# Patient Record
Sex: FEMALE | Race: BLACK OR AFRICAN AMERICAN | NOT HISPANIC OR LATINO
[De-identification: names, ages, dates, MRNs, and addresses within clinical notes are randomized per-mention and may not be internally consistent; named-entity substitution may affect disease eponyms.]

---
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
## 2021-01-19 ENCOUNTER — APPOINTMENT (OUTPATIENT)
Dept: ENDOCRINOLOGY | Facility: CLINIC | Age: 21
End: 2021-01-19
Payer: COMMERCIAL

## 2021-01-19 ENCOUNTER — NON-APPOINTMENT (OUTPATIENT)
Age: 21
End: 2021-01-19

## 2021-01-19 VITALS
HEART RATE: 108 BPM | OXYGEN SATURATION: 98 % | HEIGHT: 61 IN | WEIGHT: 117 LBS | DIASTOLIC BLOOD PRESSURE: 70 MMHG | BODY MASS INDEX: 22.09 KG/M2 | SYSTOLIC BLOOD PRESSURE: 116 MMHG | TEMPERATURE: 97.6 F

## 2021-01-19 PROCEDURE — 83036 HEMOGLOBIN GLYCOSYLATED A1C: CPT | Mod: QW

## 2021-01-19 PROCEDURE — 99214 OFFICE O/P EST MOD 30 MIN: CPT

## 2021-01-19 PROCEDURE — 99072 ADDL SUPL MATRL&STAF TM PHE: CPT

## 2021-01-19 NOTE — PHYSICAL EXAM
[Alert] : alert [Well Nourished] : well nourished [No Acute Distress] : no acute distress [Well Developed] : well developed [Normal Sclera/Conjunctiva] : normal sclera/conjunctiva [EOMI] : extra ocular movement intact [No Proptosis] : no proptosis [Normal Oropharynx] : the oropharynx was normal [Thyroid Not Enlarged] : the thyroid was not enlarged [No Thyroid Nodules] : no palpable thyroid nodules [No Respiratory Distress] : no respiratory distress [No Accessory Muscle Use] : no accessory muscle use [Clear to Auscultation] : lungs were clear to auscultation bilaterally [Normal S1, S2] : normal S1 and S2 [Normal Rate] : heart rate was normal [Regular Rhythm] : with a regular rhythm [No Edema] : no peripheral edema [Pedal Pulses Normal] : the pedal pulses are present [Normal Bowel Sounds] : normal bowel sounds [Not Tender] : non-tender [Not Distended] : not distended [Soft] : abdomen soft [Normal Anterior Cervical Nodes] : no anterior cervical lymphadenopathy [Normal Posterior Cervical Nodes] : no posterior cervical lymphadenopathy [No Spinal Tenderness] : no spinal tenderness [Spine Straight] : spine straight [No Stigmata of Cushings Syndrome] : no stigmata of Cushings Syndrome [Normal Gait] : normal gait [Normal Strength/Tone] : muscle strength and tone were normal [No Rash] : no rash [Acanthosis Nigricans] : no acanthosis nigricans [Normal] : normal [Full ROM] : with full range of motion [Diminished Throughout Both Feet] : normal tactile sensation with monofilament testing throughout both feet [Normal Reflexes] : deep tendon reflexes were 2+ and symmetric [No Tremors] : no tremors [Oriented x3] : oriented to person, place, and time

## 2021-01-19 NOTE — ASSESSMENT
[FreeTextEntry1] : Cruz is a 20-year-old female with history of type 1 diabetes, diagnosed in 2011, at age 11, overall poor A1c control between 9.0% to greater than 14%, presenting for endocrinology care.\par \par 1.  Type 1 diabetes, poorly controlled, A1c is 9.0%.\par In the past, obstacle to good glycemic control included lack of boluses with meals, dietary and adherence.  She had a recent , has been having poor p.o. intake, therefore only drinking Gatorade with her meals.  She has not been bolusing for glucose/carbohydrate with the Gatorade intake.  She is drinking regular Gatorade as well.\par Discussed with patient the importance of good glycemic control to prevent cardiovascular and micro vascular complications of type 1 diabetes mellitus.\par For now we will keep her insulin pump in the current setting.\par 12 AM, 1.05 units/h\par 6 AM, 0.95 units/h\par Total daily 23 units.\par Insulin to carbohydrate ratio 1:10\par Correction factor 1: 55\par Target 120\par Follow-up with CDE, she appreciate carbohydrate counting.\par If she is open to the idea of a closed-loop system, can consider switching to tandem T slim pump with a Dexcom G6.\par \par ·Family planning was discussed and discussed that contraception should be prescribed and used until she is prepared and ready to become pregnant.\par ·We discussed the importance of glycemic control as close to normal as is safely possible, ideally A1C <6.5% (48 mmol/mol), to reduce the risk of congenital anomalies. \par ·We discussed that observational studies show an increased risk of diabetic embryopathy, especially anencephaly, microcephaly, congenital heart disease, and caudal regression, directly proportional to elevations in A1C during the first 10 weeks of pregnancy. Although observational studies are confounded by the association between elevated periconceptional A1C and other poor self-care behaviors, the quantity and consistency of data are convincing and support the recommendation to optimize glycemic control prior to conception, with A1C <6.5% (48 mmol/mol) associated with the lowest risk of congenital anomalies. \par ·We discussed the potential progression of diabetic retinopathy. Dilated eye examinations should occur before pregnancy or in the first trimester, and then patients should be monitored every trimester and for 1-year postpartum as indicated by the degree of retinopathy and as recommended by the eye care provider.eview of the medication list for potentially teratogenic drugs, i.e., ACE inhibitors, angiotensin receptor blockers, and statins, and referral for a comprehensive eye exam was given to patient. \par ·We reviewed of the medication list for potentially teratogenic drugs, i.e., ACE inhibitors, angiotensin receptor blockers, and statins, and referral for a comprehensive eye exam was given to patient. \par \par She is meeting with her OB/GYN, considering progesterone implants for contraception.\par  \par

## 2021-01-19 NOTE — HISTORY OF PRESENT ILLNESS
[FreeTextEntry1] : Ms. FRANCY SINGLETON is a 19 year old female with history of Type 1 DM, diagnosed in , at age 11.  \par At that time, she was dehydrated, feeling very sick, FSG greater than 500 mg/dl, she was in DKA and was admitted to [facility]. \par She was followed by Dr. Mireles (pediatric endocrinologist) since her diagnosis. \par \par She's currently taking NovoLog on insulin pump. \par She has Dexcom G6, she hasn't put her sensor back on since 2021.\par Her last follow-up with me was more than 1 year ago.\par Since last visit, patient had a recent .  She recently got engaged as well.\par She stated that since her  in 12/15/2020, she has been feeling a lot more nauseous.  She states that she has not been able to tolerate much p.o. intake via solid.  She states that she drinks half a bottle of Gatorade with each meal currently.  She states that she has not been giving herself the insulin boluses with her Gatorade intake.\par \par Patient stated that her last eye exam was within the year.  There is no history of diabetic retinopathy.  Patient is states that she also follows up with a podiatrist on a regular basis.  She reported there is some intermittent numbness in her feet.\par \par She denies any history of nephropathy.  \par \par She does Carb counting.   In the past, patient has high carbohydrate intake with each meal, sometimes  g of carbohydrate with each meal.\par \par  \par Her A1c today is 9.0%, 2021.\par \par TSH 1.01 and Celiac screen negative

## 2021-01-20 LAB
ALBUMIN SERPL ELPH-MCNC: 4.4 G/DL
ALP BLD-CCNC: 94 U/L
ALT SERPL-CCNC: 9 U/L
ANION GAP SERPL CALC-SCNC: 14 MMOL/L
AST SERPL-CCNC: 11 U/L
BILIRUB SERPL-MCNC: 0.4 MG/DL
BUN SERPL-MCNC: 13 MG/DL
CALCIUM SERPL-MCNC: 9.9 MG/DL
CHLORIDE SERPL-SCNC: 94 MMOL/L
CHOLEST SERPL-MCNC: 210 MG/DL
CO2 SERPL-SCNC: 24 MMOL/L
CREAT SERPL-MCNC: 0.66 MG/DL
CREAT SPEC-SCNC: 22 MG/DL
GLUCOSE SERPL-MCNC: 464 MG/DL
HBA1C MFR BLD HPLC: 9
HDLC SERPL-MCNC: 52 MG/DL
LDLC SERPL CALC-MCNC: 149 MG/DL
MICROALBUMIN 24H UR DL<=1MG/L-MCNC: <1.2 MG/DL
MICROALBUMIN/CREAT 24H UR-RTO: NORMAL MG/G
NONHDLC SERPL-MCNC: 159 MG/DL
POTASSIUM SERPL-SCNC: 5.3 MMOL/L
PROT SERPL-MCNC: 7 G/DL
SODIUM SERPL-SCNC: 131 MMOL/L
TRIGL SERPL-MCNC: 50 MG/DL
TSH SERPL-ACNC: 1.37 UIU/ML

## 2021-01-22 ENCOUNTER — RESULT CHARGE (OUTPATIENT)
Age: 21
End: 2021-01-22

## 2021-01-22 ENCOUNTER — APPOINTMENT (OUTPATIENT)
Dept: OBGYN | Facility: CLINIC | Age: 21
End: 2021-01-22
Payer: COMMERCIAL

## 2021-01-22 VITALS
WEIGHT: 117.38 LBS | BODY MASS INDEX: 22.16 KG/M2 | SYSTOLIC BLOOD PRESSURE: 126 MMHG | DIASTOLIC BLOOD PRESSURE: 83 MMHG | HEIGHT: 61 IN

## 2021-01-22 DIAGNOSIS — N91.2 AMENORRHEA, UNSPECIFIED: ICD-10-CM

## 2021-01-22 DIAGNOSIS — Z87.42 PERSONAL HISTORY OF OTHER DISEASES OF THE FEMALE GENITAL TRACT: ICD-10-CM

## 2021-01-22 LAB
HCG UR QL: NEGATIVE
TTG IGA SER IA-ACNC: <1.2 U/ML
TTG IGA SER-ACNC: NEGATIVE
TTG IGG SER IA-ACNC: 6.8 U/ML
TTG IGG SER IA-ACNC: ABNORMAL

## 2021-01-22 PROCEDURE — 99072 ADDL SUPL MATRL&STAF TM PHE: CPT

## 2021-01-22 PROCEDURE — 99212 OFFICE O/P EST SF 10 MIN: CPT

## 2021-01-25 ENCOUNTER — NON-APPOINTMENT (OUTPATIENT)
Age: 21
End: 2021-01-25

## 2021-01-25 LAB — HCG SERPL-MCNC: 9 MIU/ML

## 2021-02-08 ENCOUNTER — APPOINTMENT (OUTPATIENT)
Dept: ENDOCRINOLOGY | Facility: CLINIC | Age: 21
End: 2021-02-08

## 2021-02-23 ENCOUNTER — RESULT CHARGE (OUTPATIENT)
Age: 21
End: 2021-02-23

## 2021-02-23 ENCOUNTER — APPOINTMENT (OUTPATIENT)
Dept: OBGYN | Facility: CLINIC | Age: 21
End: 2021-02-23
Payer: COMMERCIAL

## 2021-02-23 ENCOUNTER — APPOINTMENT (OUTPATIENT)
Dept: OBGYN | Facility: CLINIC | Age: 21
End: 2021-02-23

## 2021-02-23 ENCOUNTER — NON-APPOINTMENT (OUTPATIENT)
Age: 21
End: 2021-02-23

## 2021-02-23 VITALS — DIASTOLIC BLOOD PRESSURE: 68 MMHG | WEIGHT: 117 LBS | SYSTOLIC BLOOD PRESSURE: 100 MMHG

## 2021-02-23 DIAGNOSIS — Z20.2 CONTACT WITH AND (SUSPECTED) EXPOSURE TO INFECTIONS WITH A PREDOMINANTLY SEXUAL MODE OF TRANSMISSION: ICD-10-CM

## 2021-02-23 DIAGNOSIS — O02.1 MISSED ABORTION: ICD-10-CM

## 2021-02-23 DIAGNOSIS — Z31.69 ENCOUNTER FOR OTHER GENERAL COUNSELING AND ADVICE ON PROCREATION: ICD-10-CM

## 2021-02-23 DIAGNOSIS — Z30.432 ENCOUNTER FOR REMOVAL OF INTRAUTERINE CONTRACEPTIVE DEVICE: ICD-10-CM

## 2021-02-23 DIAGNOSIS — A74.9 CHLAMYDIAL INFECTION, UNSPECIFIED: ICD-10-CM

## 2021-02-23 LAB
BILIRUB UR QL STRIP: NEGATIVE
CLARITY UR: CLEAR
COLLECTION METHOD: NORMAL
GLUCOSE UR-MCNC: NORMAL
HCG UR QL: 0.2 EU/DL
HGB UR QL STRIP.AUTO: NEGATIVE
KETONES UR-MCNC: NEGATIVE
LEUKOCYTE ESTERASE UR QL STRIP: NEGATIVE
NITRITE UR QL STRIP: NEGATIVE
PH UR STRIP: 5.5
PROT UR STRIP-MCNC: NEGATIVE
SP GR UR STRIP: 1.02

## 2021-02-23 PROCEDURE — 99072 ADDL SUPL MATRL&STAF TM PHE: CPT

## 2021-02-23 PROCEDURE — 99212 OFFICE O/P EST SF 10 MIN: CPT

## 2021-02-23 NOTE — CHIEF COMPLAINT
[FreeTextEntry1] : Patient's mother called saying patient was in pain. Possible UTI? Upon seeing the patient she has vague pain yesterday and nothing today \par She is using condoms for birth control

## 2021-02-24 ENCOUNTER — NON-APPOINTMENT (OUTPATIENT)
Age: 21
End: 2021-02-24

## 2021-02-24 LAB
C TRACH RRNA SPEC QL NAA+PROBE: NOT DETECTED
N GONORRHOEA RRNA SPEC QL NAA+PROBE: NOT DETECTED
SOURCE AMPLIFICATION: NORMAL

## 2021-02-26 LAB
BACTERIA UR CULT: NORMAL
HCG SERPL-MCNC: 1 MIU/ML

## 2021-04-08 ENCOUNTER — APPOINTMENT (OUTPATIENT)
Dept: OBGYN | Facility: CLINIC | Age: 21
End: 2021-04-08
Payer: COMMERCIAL

## 2021-04-08 VITALS
SYSTOLIC BLOOD PRESSURE: 112 MMHG | HEIGHT: 61 IN | DIASTOLIC BLOOD PRESSURE: 78 MMHG | BODY MASS INDEX: 24.55 KG/M2 | TEMPERATURE: 97.7 F | WEIGHT: 130 LBS

## 2021-04-08 DIAGNOSIS — N92.6 IRREGULAR MENSTRUATION, UNSPECIFIED: ICD-10-CM

## 2021-04-08 LAB
BASOPHILS # BLD AUTO: 0.02 K/UL
BASOPHILS NFR BLD AUTO: 0.3 %
EOSINOPHIL # BLD AUTO: 0.05 K/UL
EOSINOPHIL NFR BLD AUTO: 0.7 %
ESTIMATED AVERAGE GLUCOSE: 148 MG/DL
HBA1C MFR BLD HPLC: 6.8 %
HCT VFR BLD CALC: 37.7 %
HGB BLD-MCNC: 12.4 G/DL
IMM GRANULOCYTES NFR BLD AUTO: 0.4 %
LYMPHOCYTES # BLD AUTO: 1.39 K/UL
LYMPHOCYTES NFR BLD AUTO: 20.8 %
MAN DIFF?: NORMAL
MCHC RBC-ENTMCNC: 30.9 PG
MCHC RBC-ENTMCNC: 32.9 GM/DL
MCV RBC AUTO: 94 FL
MONOCYTES # BLD AUTO: 0.61 K/UL
MONOCYTES NFR BLD AUTO: 9.1 %
NEUTROPHILS # BLD AUTO: 4.59 K/UL
NEUTROPHILS NFR BLD AUTO: 68.7 %
PLATELET # BLD AUTO: 383 K/UL
RBC # BLD: 4.01 M/UL
RBC # FLD: 11.9 %
WBC # FLD AUTO: 6.69 K/UL

## 2021-04-08 PROCEDURE — 76830 TRANSVAGINAL US NON-OB: CPT

## 2021-04-08 PROCEDURE — 99213 OFFICE O/P EST LOW 20 MIN: CPT | Mod: 25

## 2021-04-08 PROCEDURE — 99072 ADDL SUPL MATRL&STAF TM PHE: CPT

## 2021-04-08 RX ORDER — FOLIC ACID 1 MG/1
1 TABLET ORAL DAILY
Qty: 90 | Refills: 3 | Status: ACTIVE | COMMUNITY
Start: 2021-04-08 | End: 1900-01-01

## 2021-04-08 RX ORDER — FOLIC ACID/MULTIVIT,IRON,MINER 0.4MG-18MG
200 TABLET ORAL
Qty: 1 | Refills: 10 | Status: ACTIVE | COMMUNITY
Start: 2021-04-08 | End: 1900-01-01

## 2021-04-08 NOTE — PROCEDURE
[Transvaginal OB Sonogram] : Transvaginal OB Sonogram [Transvaginal OB Sonogram WNL] : Transvaginal OB Sonogram WNL [FreeTextEntry1] : SIUP w CRL c/w 8+3, +, MAICO 11/31/21

## 2021-04-09 ENCOUNTER — NON-APPOINTMENT (OUTPATIENT)
Age: 21
End: 2021-04-09

## 2021-04-09 LAB
ALBUMIN SERPL ELPH-MCNC: 4.7 G/DL
ALP BLD-CCNC: 77 U/L
ALT SERPL-CCNC: 10 U/L
ANION GAP SERPL CALC-SCNC: 15 MMOL/L
AST SERPL-CCNC: 13 U/L
BILIRUB SERPL-MCNC: 0.3 MG/DL
BUN SERPL-MCNC: 11 MG/DL
CALCIUM SERPL-MCNC: 9.9 MG/DL
CHLORIDE SERPL-SCNC: 101 MMOL/L
CO2 SERPL-SCNC: 21 MMOL/L
CREAT SERPL-MCNC: 0.57 MG/DL
CREAT SPEC-SCNC: 88 MG/DL
CREAT/PROT UR: 0.1 RATIO
GLUCOSE SERPL-MCNC: 163 MG/DL
POTASSIUM SERPL-SCNC: 4.6 MMOL/L
PROT SERPL-MCNC: 7.6 G/DL
PROT UR-MCNC: 9 MG/DL
SODIUM SERPL-SCNC: 137 MMOL/L
TSH SERPL-ACNC: 1.27 UIU/ML

## 2021-04-10 LAB — BACTERIA UR CULT: NORMAL

## 2021-04-19 ENCOUNTER — APPOINTMENT (OUTPATIENT)
Dept: MATERNAL FETAL MEDICINE | Facility: CLINIC | Age: 21
End: 2021-04-19

## 2021-04-22 ENCOUNTER — NON-APPOINTMENT (OUTPATIENT)
Age: 21
End: 2021-04-22

## 2021-04-22 ENCOUNTER — APPOINTMENT (OUTPATIENT)
Dept: OBGYN | Facility: CLINIC | Age: 21
End: 2021-04-22
Payer: COMMERCIAL

## 2021-04-22 VITALS — SYSTOLIC BLOOD PRESSURE: 102 MMHG | DIASTOLIC BLOOD PRESSURE: 60 MMHG | WEIGHT: 131.13 LBS

## 2021-04-22 VITALS — TEMPERATURE: 96.9 F

## 2021-04-22 PROCEDURE — 0501F PRENATAL FLOW SHEET: CPT

## 2021-04-23 LAB
ABO + RH PNL BLD: NORMAL
BASOPHILS # BLD AUTO: 0.03 K/UL
BASOPHILS NFR BLD AUTO: 0.4 %
BLD GP AB SCN SERPL QL: NORMAL
EOSINOPHIL # BLD AUTO: 0.04 K/UL
EOSINOPHIL NFR BLD AUTO: 0.6 %
HBV SURFACE AG SER QL: NONREACTIVE
HCT VFR BLD CALC: 38.1 %
HCV AB SER QL: NONREACTIVE
HCV S/CO RATIO: 0.65 S/CO
HGB A MFR BLD: 97 %
HGB A2 MFR BLD: 2.5 %
HGB BLD-MCNC: 12.5 G/DL
HGB F MFR BLD: 0.5 %
HGB FRACT BLD-IMP: NORMAL
HIV1+2 AB SPEC QL IA.RAPID: NONREACTIVE
IMM GRANULOCYTES NFR BLD AUTO: 0.3 %
LYMPHOCYTES # BLD AUTO: 1.99 K/UL
LYMPHOCYTES NFR BLD AUTO: 28.3 %
MAN DIFF?: NORMAL
MCHC RBC-ENTMCNC: 30.6 PG
MCHC RBC-ENTMCNC: 32.8 GM/DL
MCV RBC AUTO: 93.2 FL
MEV IGG FLD QL IA: 70.8 AU/ML
MEV IGG+IGM SER-IMP: POSITIVE
MONOCYTES # BLD AUTO: 0.35 K/UL
MONOCYTES NFR BLD AUTO: 5 %
NEUTROPHILS # BLD AUTO: 4.61 K/UL
NEUTROPHILS NFR BLD AUTO: 65.4 %
PLATELET # BLD AUTO: 403 K/UL
RBC # BLD: 4.09 M/UL
RBC # FLD: 11.9 %
RUBV IGG FLD-ACNC: 9.2 INDEX
RUBV IGG SER-IMP: POSITIVE
T PALLIDUM AB SER QL IA: NEGATIVE
VZV AB TITR SER: POSITIVE
VZV IGG SER IF-ACNC: 203 INDEX
WBC # FLD AUTO: 7.04 K/UL

## 2021-04-24 LAB
BACTERIA UR CULT: ABNORMAL
C TRACH RRNA SPEC QL NAA+PROBE: NOT DETECTED
LEAD BLD-MCNC: <1 UG/DL
N GONORRHOEA RRNA SPEC QL NAA+PROBE: NOT DETECTED
SOURCE AMPLIFICATION: NORMAL

## 2021-04-27 ENCOUNTER — APPOINTMENT (OUTPATIENT)
Dept: OBGYN | Facility: CLINIC | Age: 21
End: 2021-04-27

## 2021-04-27 ENCOUNTER — APPOINTMENT (OUTPATIENT)
Dept: ENDOCRINOLOGY | Facility: CLINIC | Age: 21
End: 2021-04-27
Payer: COMMERCIAL

## 2021-04-27 VITALS
OXYGEN SATURATION: 99 % | DIASTOLIC BLOOD PRESSURE: 60 MMHG | SYSTOLIC BLOOD PRESSURE: 110 MMHG | TEMPERATURE: 97.5 F | WEIGHT: 130 LBS | HEART RATE: 111 BPM

## 2021-04-27 LAB
BILIRUB UR QL STRIP: NEGATIVE
CLARITY UR: CLEAR
COLLECTION METHOD: NORMAL
GLUCOSE UR-MCNC: ABNORMAL
HCG UR QL: 0.2 EU/DL
HGB UR QL STRIP.AUTO: NEGATIVE
KETONES UR-MCNC: ABNORMAL
LEUKOCYTE ESTERASE UR QL STRIP: NEGATIVE
NITRITE UR QL STRIP: NEGATIVE
PH UR STRIP: 7
PROT UR STRIP-MCNC: NEGATIVE
SP GR UR STRIP: 1.02

## 2021-04-27 PROCEDURE — 99072 ADDL SUPL MATRL&STAF TM PHE: CPT

## 2021-04-27 PROCEDURE — 99214 OFFICE O/P EST MOD 30 MIN: CPT

## 2021-04-27 NOTE — ASSESSMENT
[Diabetes Foot Care] : diabetes foot care [FreeTextEntry1] : Cruz is a 21-year-old female with history of type 1 diabetes, diagnosed in 2011, at age 11, currently pregnant.\par \par 1.  Type 1 diabetes\par : His A1c on April 8, 2021 improved to 6.8% from 9.0% in January 2021.\par Patient is currently pregnant.  She had establish care with [facility] diabetes care.  She will be following up with [facility] diabetes seen during this pregnancy.\par \par For now we will keep her insulin pump in the current setting.\par 12AM : 1.5 UNIT/HR. \par Insulin to carbohydrate ratio 1:10 --> 1:9\par Correction factor 1: 50\par Target 120\par Follow-up with C[facility], she appreciate carbohydrate counting.\par \par If she is open to the idea of a closed-loop system, can consider switching to tandem T slim pump with a Dexcom G6. Will swith after delivery. \par \par Will FU in Dec 2021\par  \par  [Long Term Vascular Complications] : long term vascular complications of diabetes [Carbohydrate Consistent Diet] : carbohydrate consistent diet [Importance of Diet and Exercise] : importance of diet and exercise to improve glycemic control, achieve weight loss and improve cardiovascular health [Exercise/Effect on Glucose] : exercise/effect on glucose [Glucagon Use] : glucagon use [Hypoglycemia Management] : hypoglycemia management [Ketone Testing] : ketone testing [Action and use of Insulin] : action and use of short and long-acting insulin [Self Monitoring of Blood Glucose] : self monitoring of blood glucose [Insulin Self-Administration] : insulin self-administration [Injection Technique, Storage, Sharps Disposal] : injection technique, storage, and sharps disposal [Sick-Day Management] : sick-day management [Retinopathy Screening] : Patient was referred to ophthalmology for retinopathy screening

## 2021-04-27 NOTE — HISTORY OF PRESENT ILLNESS
[FreeTextEntry1] : Ms. FRANCY SINGLETON is a 19 year old female with history of Type 1 DM, diagnosed in 2011, at age 11.  \par At that time, she was dehydrated, feeling very sick, FSG greater than 500 mg/dl, she was in DKA and was admitted to [facility]. \par She was followed by Dr. Mireles (pediatric endocrinologist) since her diagnosis. \par She's currently taking NovoLog on insulin pump.  She uses Omni pod.\par She had a termination of pregnancy on 12/15/2020.\par She is currently pregnant again, intended pregnancy, estimated due date in November 2021.\par Patient had a establish care with [facility] diabetes management team there.\par \par Patient stated that her last eye exam was within the year.  There is no history of diabetic retinopathy.  Patient is states that she also follows up with a podiatrist on a regular basis.  She is going to follow-up with [place] ophthalmologist and podiatrist.\par \par She denies any history of nephropathy.  \par \par April 8, 2021, A1c 6.8%.

## 2021-04-27 NOTE — PHYSICAL EXAM
[Alert] : alert [Well Nourished] : well nourished [No Acute Distress] : no acute distress [Well Developed] : well developed [Normal Sclera/Conjunctiva] : normal sclera/conjunctiva [EOMI] : extra ocular movement intact [No Proptosis] : no proptosis [Normal Oropharynx] : the oropharynx was normal [Thyroid Not Enlarged] : the thyroid was not enlarged [No Thyroid Nodules] : no palpable thyroid nodules [No Respiratory Distress] : no respiratory distress [No Accessory Muscle Use] : no accessory muscle use [Clear to Auscultation] : lungs were clear to auscultation bilaterally [Normal S1, S2] : normal S1 and S2 [Normal Rate] : heart rate was normal [Regular Rhythm] : with a regular rhythm [No Edema] : no peripheral edema [Pedal Pulses Normal] : the pedal pulses are present [Normal Bowel Sounds] : normal bowel sounds [Not Distended] : not distended [Not Tender] : non-tender [Normal Anterior Cervical Nodes] : no anterior cervical lymphadenopathy [Soft] : abdomen soft [No Spinal Tenderness] : no spinal tenderness [Normal Posterior Cervical Nodes] : no posterior cervical lymphadenopathy [Spine Straight] : spine straight [No Stigmata of Cushings Syndrome] : no stigmata of Cushings Syndrome [Normal Gait] : normal gait [No Rash] : no rash [Normal Strength/Tone] : muscle strength and tone were normal [Acanthosis Nigricans] : no acanthosis nigricans [Normal Reflexes] : deep tendon reflexes were 2+ and symmetric [No Tremors] : no tremors [Oriented x3] : oriented to person, place, and time

## 2021-04-28 LAB — AR GENE MUT ANL BLD/T: NORMAL

## 2021-04-29 ENCOUNTER — ASOB RESULT (OUTPATIENT)
Age: 21
End: 2021-04-29

## 2021-04-29 ENCOUNTER — APPOINTMENT (OUTPATIENT)
Dept: MATERNAL FETAL MEDICINE | Facility: CLINIC | Age: 21
End: 2021-04-29
Payer: COMMERCIAL

## 2021-04-29 ENCOUNTER — NON-APPOINTMENT (OUTPATIENT)
Age: 21
End: 2021-04-29

## 2021-04-29 PROCEDURE — G0108 DIAB MANAGE TRN  PER INDIV: CPT | Mod: 95

## 2021-04-30 ENCOUNTER — NON-APPOINTMENT (OUTPATIENT)
Age: 21
End: 2021-04-30

## 2021-05-02 LAB
BACTERIA UR CULT: ABNORMAL
CFTR MUT TESTED BLD/T: NEGATIVE
CLARI ADDITIONAL INFO: NORMAL
CLARI CHROMOSOME 13: NORMAL
CLARI CHROMOSOME 18: NORMAL
CLARI CHROMOSOME 21: NORMAL
CLARI SEX CHROMOSOMES: NORMAL
CLARITEST NIPT: NORMAL
FMR1 GENE MUT ANL BLD/T: NORMAL

## 2021-05-04 ENCOUNTER — NON-APPOINTMENT (OUTPATIENT)
Age: 21
End: 2021-05-04

## 2021-05-05 DIAGNOSIS — U07.1 COVID-19: ICD-10-CM

## 2021-05-06 ENCOUNTER — APPOINTMENT (OUTPATIENT)
Dept: ANTEPARTUM | Facility: CLINIC | Age: 21
End: 2021-05-06

## 2021-05-06 ENCOUNTER — APPOINTMENT (OUTPATIENT)
Dept: MATERNAL FETAL MEDICINE | Facility: CLINIC | Age: 21
End: 2021-05-06

## 2021-05-06 ENCOUNTER — APPOINTMENT (OUTPATIENT)
Dept: MATERNAL FETAL MEDICINE | Facility: CLINIC | Age: 21
End: 2021-05-06
Payer: COMMERCIAL

## 2021-05-06 ENCOUNTER — ASOB RESULT (OUTPATIENT)
Age: 21
End: 2021-05-06

## 2021-05-06 PROCEDURE — G0108 DIAB MANAGE TRN  PER INDIV: CPT | Mod: 95

## 2021-05-13 ENCOUNTER — NON-APPOINTMENT (OUTPATIENT)
Age: 21
End: 2021-05-13

## 2021-05-14 ENCOUNTER — APPOINTMENT (OUTPATIENT)
Dept: ANTEPARTUM | Facility: CLINIC | Age: 21
End: 2021-05-14

## 2021-05-20 ENCOUNTER — NON-APPOINTMENT (OUTPATIENT)
Age: 21
End: 2021-05-20

## 2021-05-20 ENCOUNTER — APPOINTMENT (OUTPATIENT)
Dept: OBGYN | Facility: CLINIC | Age: 21
End: 2021-05-20
Payer: COMMERCIAL

## 2021-05-20 VITALS
WEIGHT: 128.13 LBS | BODY MASS INDEX: 24.19 KG/M2 | DIASTOLIC BLOOD PRESSURE: 60 MMHG | HEIGHT: 61 IN | SYSTOLIC BLOOD PRESSURE: 90 MMHG

## 2021-05-20 VITALS — TEMPERATURE: 96.6 F

## 2021-05-20 PROCEDURE — 0502F SUBSEQUENT PRENATAL CARE: CPT

## 2021-05-22 LAB — BACTERIA UR CULT: NORMAL

## 2021-05-24 ENCOUNTER — APPOINTMENT (OUTPATIENT)
Dept: MATERNAL FETAL MEDICINE | Facility: CLINIC | Age: 21
End: 2021-05-24

## 2021-05-24 ENCOUNTER — NON-APPOINTMENT (OUTPATIENT)
Age: 21
End: 2021-05-24

## 2021-05-24 ENCOUNTER — ASOB RESULT (OUTPATIENT)
Age: 21
End: 2021-05-24

## 2021-05-24 ENCOUNTER — APPOINTMENT (OUTPATIENT)
Dept: ANTEPARTUM | Facility: CLINIC | Age: 21
End: 2021-05-24
Payer: COMMERCIAL

## 2021-05-24 PROCEDURE — 76805 OB US >/= 14 WKS SNGL FETUS: CPT

## 2021-06-03 ENCOUNTER — NON-APPOINTMENT (OUTPATIENT)
Age: 21
End: 2021-06-03

## 2021-06-09 ENCOUNTER — NON-APPOINTMENT (OUTPATIENT)
Age: 21
End: 2021-06-09

## 2021-06-09 ENCOUNTER — APPOINTMENT (OUTPATIENT)
Dept: OPHTHALMOLOGY | Facility: CLINIC | Age: 21
End: 2021-06-09
Payer: COMMERCIAL

## 2021-06-09 PROCEDURE — 92014 COMPRE OPH EXAM EST PT 1/>: CPT

## 2021-06-09 PROCEDURE — 92250 FUNDUS PHOTOGRAPHY W/I&R: CPT

## 2021-06-10 ENCOUNTER — APPOINTMENT (OUTPATIENT)
Dept: MATERNAL FETAL MEDICINE | Facility: CLINIC | Age: 21
End: 2021-06-10

## 2021-06-16 ENCOUNTER — NON-APPOINTMENT (OUTPATIENT)
Age: 21
End: 2021-06-16

## 2021-06-16 LAB
2ND TRIMESTER DATA: NORMAL
AFP PNL SERPL: NORMAL
AFP SERPL-ACNC: NORMAL
CLINICAL BIOCHEMIST REVIEW: NORMAL
NOTES NTD: NORMAL

## 2021-06-17 ENCOUNTER — APPOINTMENT (OUTPATIENT)
Dept: MATERNAL FETAL MEDICINE | Facility: CLINIC | Age: 21
End: 2021-06-17

## 2021-06-18 ENCOUNTER — NON-APPOINTMENT (OUTPATIENT)
Age: 21
End: 2021-06-18

## 2021-06-18 ENCOUNTER — APPOINTMENT (OUTPATIENT)
Dept: OBGYN | Facility: CLINIC | Age: 21
End: 2021-06-18
Payer: COMMERCIAL

## 2021-06-18 VITALS
SYSTOLIC BLOOD PRESSURE: 118 MMHG | WEIGHT: 127 LBS | TEMPERATURE: 96.9 F | BODY MASS INDEX: 23.98 KG/M2 | DIASTOLIC BLOOD PRESSURE: 74 MMHG | HEIGHT: 61 IN

## 2021-06-18 PROCEDURE — 0502F SUBSEQUENT PRENATAL CARE: CPT

## 2021-06-18 RX ORDER — TERCONAZOLE 8 MG/G
0.8 CREAM VAGINAL
Qty: 1 | Refills: 0 | Status: ACTIVE | COMMUNITY
Start: 2021-06-18 | End: 1900-01-01

## 2021-06-19 LAB
CANDIDA VAG CYTO: DETECTED
G VAGINALIS+PREV SP MTYP VAG QL MICRO: NOT DETECTED
T VAGINALIS VAG QL WET PREP: NOT DETECTED

## 2021-06-28 ENCOUNTER — APPOINTMENT (OUTPATIENT)
Dept: ANTEPARTUM | Facility: CLINIC | Age: 21
End: 2021-06-28
Payer: COMMERCIAL

## 2021-06-28 ENCOUNTER — ASOB RESULT (OUTPATIENT)
Age: 21
End: 2021-06-28

## 2021-06-28 PROCEDURE — 76827 ECHO EXAM OF FETAL HEART: CPT

## 2021-06-28 PROCEDURE — 76825 ECHO EXAM OF FETAL HEART: CPT

## 2021-06-28 PROCEDURE — 93325 DOPPLER ECHO COLOR FLOW MAPG: CPT

## 2021-06-30 ENCOUNTER — APPOINTMENT (OUTPATIENT)
Dept: MATERNAL FETAL MEDICINE | Facility: CLINIC | Age: 21
End: 2021-06-30
Payer: COMMERCIAL

## 2021-06-30 ENCOUNTER — APPOINTMENT (OUTPATIENT)
Dept: ANTEPARTUM | Facility: CLINIC | Age: 21
End: 2021-06-30
Payer: COMMERCIAL

## 2021-06-30 ENCOUNTER — NON-APPOINTMENT (OUTPATIENT)
Age: 21
End: 2021-06-30

## 2021-06-30 ENCOUNTER — ASOB RESULT (OUTPATIENT)
Age: 21
End: 2021-06-30

## 2021-06-30 ENCOUNTER — APPOINTMENT (OUTPATIENT)
Dept: MATERNAL FETAL MEDICINE | Facility: CLINIC | Age: 21
End: 2021-06-30

## 2021-06-30 VITALS
HEART RATE: 89 BPM | HEIGHT: 61 IN | WEIGHT: 132 LBS | DIASTOLIC BLOOD PRESSURE: 68 MMHG | SYSTOLIC BLOOD PRESSURE: 108 MMHG | RESPIRATION RATE: 16 BRPM | BODY MASS INDEX: 24.92 KG/M2 | OXYGEN SATURATION: 99 %

## 2021-06-30 PROCEDURE — G0108 DIAB MANAGE TRN  PER INDIV: CPT | Mod: 95

## 2021-06-30 PROCEDURE — 99214 OFFICE O/P EST MOD 30 MIN: CPT

## 2021-06-30 PROCEDURE — XXXXX: CPT

## 2021-06-30 PROCEDURE — 95251 CONT GLUC MNTR ANALYSIS I&R: CPT

## 2021-06-30 PROCEDURE — 76811 OB US DETAILED SNGL FETUS: CPT

## 2021-07-06 ENCOUNTER — ASOB RESULT (OUTPATIENT)
Age: 21
End: 2021-07-06

## 2021-07-06 ENCOUNTER — APPOINTMENT (OUTPATIENT)
Dept: MATERNAL FETAL MEDICINE | Facility: CLINIC | Age: 21
End: 2021-07-06
Payer: COMMERCIAL

## 2021-07-06 PROCEDURE — G0108 DIAB MANAGE TRN  PER INDIV: CPT | Mod: 95

## 2021-07-14 ENCOUNTER — ASOB RESULT (OUTPATIENT)
Age: 21
End: 2021-07-14

## 2021-07-14 ENCOUNTER — APPOINTMENT (OUTPATIENT)
Dept: ANTEPARTUM | Facility: CLINIC | Age: 21
End: 2021-07-14
Payer: COMMERCIAL

## 2021-07-14 PROCEDURE — G0108 DIAB MANAGE TRN  PER INDIV: CPT | Mod: 95

## 2021-07-15 ENCOUNTER — NON-APPOINTMENT (OUTPATIENT)
Age: 21
End: 2021-07-15

## 2021-07-15 ENCOUNTER — APPOINTMENT (OUTPATIENT)
Dept: OBGYN | Facility: CLINIC | Age: 21
End: 2021-07-15
Payer: COMMERCIAL

## 2021-07-15 VITALS
WEIGHT: 133.25 LBS | HEIGHT: 61 IN | BODY MASS INDEX: 25.16 KG/M2 | DIASTOLIC BLOOD PRESSURE: 66 MMHG | SYSTOLIC BLOOD PRESSURE: 107 MMHG

## 2021-07-15 PROCEDURE — 81003 URINALYSIS AUTO W/O SCOPE: CPT | Mod: QW

## 2021-07-15 PROCEDURE — 0502F SUBSEQUENT PRENATAL CARE: CPT

## 2021-07-29 ENCOUNTER — NON-APPOINTMENT (OUTPATIENT)
Age: 21
End: 2021-07-29

## 2021-07-29 ENCOUNTER — ASOB RESULT (OUTPATIENT)
Age: 21
End: 2021-07-29

## 2021-07-29 ENCOUNTER — APPOINTMENT (OUTPATIENT)
Dept: ANTEPARTUM | Facility: CLINIC | Age: 21
End: 2021-07-29
Payer: COMMERCIAL

## 2021-07-29 ENCOUNTER — APPOINTMENT (OUTPATIENT)
Dept: MATERNAL FETAL MEDICINE | Facility: CLINIC | Age: 21
End: 2021-07-29
Payer: COMMERCIAL

## 2021-07-29 VITALS
WEIGHT: 134.25 LBS | DIASTOLIC BLOOD PRESSURE: 70 MMHG | OXYGEN SATURATION: 99 % | HEART RATE: 101 BPM | SYSTOLIC BLOOD PRESSURE: 110 MMHG | BODY MASS INDEX: 25.34 KG/M2 | HEIGHT: 61 IN

## 2021-07-29 PROCEDURE — 76816 OB US FOLLOW-UP PER FETUS: CPT

## 2021-07-29 PROCEDURE — 99214 OFFICE O/P EST MOD 30 MIN: CPT | Mod: 25

## 2021-07-29 PROCEDURE — G0108 DIAB MANAGE TRN  PER INDIV: CPT

## 2021-07-30 LAB
CREAT 24H UR-MCNC: 1.3 G/24 H
CREAT ?TM UR-MCNC: 143 MG/DL
PROT 24H UR-MRATE: 10 MG/DL
PROT ?TM UR-MCNC: 24 HR
PROT UR-MCNC: 90 MG/24 H
SPECIMEN VOL 24H UR: 900 ML

## 2021-08-10 ENCOUNTER — NON-APPOINTMENT (OUTPATIENT)
Age: 21
End: 2021-08-10

## 2021-08-12 ENCOUNTER — APPOINTMENT (OUTPATIENT)
Dept: MATERNAL FETAL MEDICINE | Facility: CLINIC | Age: 21
End: 2021-08-12

## 2021-08-12 ENCOUNTER — APPOINTMENT (OUTPATIENT)
Dept: OBGYN | Facility: CLINIC | Age: 21
End: 2021-08-12
Payer: COMMERCIAL

## 2021-08-12 ENCOUNTER — APPOINTMENT (OUTPATIENT)
Dept: ANTEPARTUM | Facility: CLINIC | Age: 21
End: 2021-08-12

## 2021-08-12 ENCOUNTER — NON-APPOINTMENT (OUTPATIENT)
Age: 21
End: 2021-08-12

## 2021-08-12 VITALS
WEIGHT: 134 LBS | HEIGHT: 61 IN | DIASTOLIC BLOOD PRESSURE: 70 MMHG | BODY MASS INDEX: 25.3 KG/M2 | SYSTOLIC BLOOD PRESSURE: 107 MMHG

## 2021-08-12 PROCEDURE — 0502F SUBSEQUENT PRENATAL CARE: CPT

## 2021-08-13 ENCOUNTER — APPOINTMENT (OUTPATIENT)
Dept: MATERNAL FETAL MEDICINE | Facility: CLINIC | Age: 21
End: 2021-08-13

## 2021-08-13 LAB
BASOPHILS # BLD AUTO: 0.03 K/UL
BASOPHILS NFR BLD AUTO: 0.4 %
EOSINOPHIL # BLD AUTO: 0.02 K/UL
EOSINOPHIL NFR BLD AUTO: 0.2 %
HCT VFR BLD CALC: 33.8 %
HGB BLD-MCNC: 11.2 G/DL
HIV1+2 AB SPEC QL IA.RAPID: NONREACTIVE
IMM GRANULOCYTES NFR BLD AUTO: 0.7 %
LYMPHOCYTES # BLD AUTO: 1.51 K/UL
LYMPHOCYTES NFR BLD AUTO: 18.8 %
MAN DIFF?: NORMAL
MCHC RBC-ENTMCNC: 30.9 PG
MCHC RBC-ENTMCNC: 33.1 GM/DL
MCV RBC AUTO: 93.1 FL
MONOCYTES # BLD AUTO: 0.34 K/UL
MONOCYTES NFR BLD AUTO: 4.2 %
NEUTROPHILS # BLD AUTO: 6.06 K/UL
NEUTROPHILS NFR BLD AUTO: 75.7 %
PLATELET # BLD AUTO: 293 K/UL
RBC # BLD: 3.63 M/UL
RBC # FLD: 12.7 %
T PALLIDUM AB SER QL IA: NEGATIVE
WBC # FLD AUTO: 8.02 K/UL

## 2021-08-18 ENCOUNTER — NON-APPOINTMENT (OUTPATIENT)
Age: 21
End: 2021-08-18

## 2021-08-25 ENCOUNTER — APPOINTMENT (OUTPATIENT)
Dept: ANTEPARTUM | Facility: CLINIC | Age: 21
End: 2021-08-25

## 2021-08-26 ENCOUNTER — APPOINTMENT (OUTPATIENT)
Dept: MATERNAL FETAL MEDICINE | Facility: CLINIC | Age: 21
End: 2021-08-26
Payer: COMMERCIAL

## 2021-08-26 ENCOUNTER — APPOINTMENT (OUTPATIENT)
Dept: ANTEPARTUM | Facility: CLINIC | Age: 21
End: 2021-08-26

## 2021-08-26 ENCOUNTER — APPOINTMENT (OUTPATIENT)
Dept: MATERNAL FETAL MEDICINE | Facility: CLINIC | Age: 21
End: 2021-08-26

## 2021-08-26 ENCOUNTER — ASOB RESULT (OUTPATIENT)
Age: 21
End: 2021-08-26

## 2021-08-26 PROCEDURE — G0108 DIAB MANAGE TRN  PER INDIV: CPT | Mod: 95

## 2021-08-27 ENCOUNTER — APPOINTMENT (OUTPATIENT)
Dept: ANTEPARTUM | Facility: CLINIC | Age: 21
End: 2021-08-27
Payer: COMMERCIAL

## 2021-08-27 ENCOUNTER — ASOB RESULT (OUTPATIENT)
Age: 21
End: 2021-08-27

## 2021-08-27 PROCEDURE — 76816 OB US FOLLOW-UP PER FETUS: CPT

## 2021-08-27 PROCEDURE — 76818 FETAL BIOPHYS PROFILE W/NST: CPT

## 2021-08-30 ENCOUNTER — APPOINTMENT (OUTPATIENT)
Dept: MATERNAL FETAL MEDICINE | Facility: CLINIC | Age: 21
End: 2021-08-30

## 2021-08-30 ENCOUNTER — NON-APPOINTMENT (OUTPATIENT)
Age: 21
End: 2021-08-30

## 2021-09-01 ENCOUNTER — ASOB RESULT (OUTPATIENT)
Age: 21
End: 2021-09-01

## 2021-09-01 ENCOUNTER — APPOINTMENT (OUTPATIENT)
Dept: ANTEPARTUM | Facility: CLINIC | Age: 21
End: 2021-09-01
Payer: COMMERCIAL

## 2021-09-01 PROCEDURE — 76818 FETAL BIOPHYS PROFILE W/NST: CPT

## 2021-09-03 ENCOUNTER — NON-APPOINTMENT (OUTPATIENT)
Age: 21
End: 2021-09-03

## 2021-09-03 ENCOUNTER — APPOINTMENT (OUTPATIENT)
Dept: OBGYN | Facility: CLINIC | Age: 21
End: 2021-09-03
Payer: COMMERCIAL

## 2021-09-03 VITALS
HEIGHT: 61 IN | WEIGHT: 137 LBS | BODY MASS INDEX: 25.86 KG/M2 | SYSTOLIC BLOOD PRESSURE: 105 MMHG | DIASTOLIC BLOOD PRESSURE: 70 MMHG

## 2021-09-03 PROCEDURE — 0502F SUBSEQUENT PRENATAL CARE: CPT

## 2021-09-03 PROCEDURE — 90715 TDAP VACCINE 7 YRS/> IM: CPT

## 2021-09-03 PROCEDURE — 90471 IMMUNIZATION ADMIN: CPT

## 2021-09-08 ENCOUNTER — APPOINTMENT (OUTPATIENT)
Dept: ANTEPARTUM | Facility: CLINIC | Age: 21
End: 2021-09-08

## 2021-09-08 ENCOUNTER — APPOINTMENT (OUTPATIENT)
Dept: ANTEPARTUM | Facility: CLINIC | Age: 21
End: 2021-09-08
Payer: COMMERCIAL

## 2021-09-08 ENCOUNTER — ASOB RESULT (OUTPATIENT)
Age: 21
End: 2021-09-08

## 2021-09-08 PROCEDURE — 76818 FETAL BIOPHYS PROFILE W/NST: CPT

## 2021-09-14 ENCOUNTER — NON-APPOINTMENT (OUTPATIENT)
Age: 21
End: 2021-09-14

## 2021-09-14 ENCOUNTER — APPOINTMENT (OUTPATIENT)
Dept: MATERNAL FETAL MEDICINE | Facility: CLINIC | Age: 21
End: 2021-09-14

## 2021-09-15 ENCOUNTER — ASOB RESULT (OUTPATIENT)
Age: 21
End: 2021-09-15

## 2021-09-15 ENCOUNTER — APPOINTMENT (OUTPATIENT)
Dept: ANTEPARTUM | Facility: CLINIC | Age: 21
End: 2021-09-15
Payer: COMMERCIAL

## 2021-09-15 PROCEDURE — 76818 FETAL BIOPHYS PROFILE W/NST: CPT

## 2021-09-17 ENCOUNTER — NON-APPOINTMENT (OUTPATIENT)
Age: 21
End: 2021-09-17

## 2021-09-17 ENCOUNTER — APPOINTMENT (OUTPATIENT)
Dept: OBGYN | Facility: CLINIC | Age: 21
End: 2021-09-17
Payer: COMMERCIAL

## 2021-09-17 VITALS — WEIGHT: 140 LBS | SYSTOLIC BLOOD PRESSURE: 100 MMHG | DIASTOLIC BLOOD PRESSURE: 64 MMHG

## 2021-09-17 PROCEDURE — 0502F SUBSEQUENT PRENATAL CARE: CPT

## 2021-09-20 ENCOUNTER — ASOB RESULT (OUTPATIENT)
Age: 21
End: 2021-09-20

## 2021-09-20 ENCOUNTER — APPOINTMENT (OUTPATIENT)
Dept: ANTEPARTUM | Facility: CLINIC | Age: 21
End: 2021-09-20
Payer: COMMERCIAL

## 2021-09-20 PROCEDURE — 76819 FETAL BIOPHYS PROFIL W/O NST: CPT

## 2021-09-22 ENCOUNTER — APPOINTMENT (OUTPATIENT)
Dept: ANTEPARTUM | Facility: CLINIC | Age: 21
End: 2021-09-22

## 2021-09-23 ENCOUNTER — ASOB RESULT (OUTPATIENT)
Age: 21
End: 2021-09-23

## 2021-09-23 ENCOUNTER — APPOINTMENT (OUTPATIENT)
Dept: MATERNAL FETAL MEDICINE | Facility: CLINIC | Age: 21
End: 2021-09-23

## 2021-09-23 ENCOUNTER — APPOINTMENT (OUTPATIENT)
Dept: ANTEPARTUM | Facility: CLINIC | Age: 21
End: 2021-09-23
Payer: COMMERCIAL

## 2021-09-23 ENCOUNTER — APPOINTMENT (OUTPATIENT)
Dept: MATERNAL FETAL MEDICINE | Facility: CLINIC | Age: 21
End: 2021-09-23
Payer: COMMERCIAL

## 2021-09-23 PROCEDURE — 76816 OB US FOLLOW-UP PER FETUS: CPT

## 2021-09-23 PROCEDURE — G0108 DIAB MANAGE TRN  PER INDIV: CPT

## 2021-09-23 PROCEDURE — 76818 FETAL BIOPHYS PROFILE W/NST: CPT

## 2021-09-27 ENCOUNTER — APPOINTMENT (OUTPATIENT)
Dept: MATERNAL FETAL MEDICINE | Facility: CLINIC | Age: 21
End: 2021-09-27

## 2021-09-27 ENCOUNTER — APPOINTMENT (OUTPATIENT)
Dept: MATERNAL FETAL MEDICINE | Facility: CLINIC | Age: 21
End: 2021-09-27
Payer: COMMERCIAL

## 2021-09-27 ENCOUNTER — ASOB RESULT (OUTPATIENT)
Age: 21
End: 2021-09-27

## 2021-09-27 ENCOUNTER — APPOINTMENT (OUTPATIENT)
Dept: ANTEPARTUM | Facility: CLINIC | Age: 21
End: 2021-09-27

## 2021-09-27 PROCEDURE — G0109 DIAB MANAGE TRN IND/GROUP: CPT | Mod: 95

## 2021-09-27 PROCEDURE — 99214 OFFICE O/P EST MOD 30 MIN: CPT | Mod: 25,95

## 2021-09-28 ENCOUNTER — APPOINTMENT (OUTPATIENT)
Dept: ANTEPARTUM | Facility: CLINIC | Age: 21
End: 2021-09-28

## 2021-09-29 ENCOUNTER — NON-APPOINTMENT (OUTPATIENT)
Age: 21
End: 2021-09-29

## 2021-09-30 ENCOUNTER — ASOB RESULT (OUTPATIENT)
Age: 21
End: 2021-09-30

## 2021-09-30 ENCOUNTER — APPOINTMENT (OUTPATIENT)
Dept: ANTEPARTUM | Facility: CLINIC | Age: 21
End: 2021-09-30
Payer: COMMERCIAL

## 2021-09-30 PROCEDURE — 76818 FETAL BIOPHYS PROFILE W/NST: CPT

## 2021-10-01 ENCOUNTER — APPOINTMENT (OUTPATIENT)
Dept: OBGYN | Facility: CLINIC | Age: 21
End: 2021-10-01
Payer: COMMERCIAL

## 2021-10-01 VITALS — WEIGHT: 141 LBS | DIASTOLIC BLOOD PRESSURE: 77 MMHG | SYSTOLIC BLOOD PRESSURE: 115 MMHG

## 2021-10-01 PROCEDURE — 0502F SUBSEQUENT PRENATAL CARE: CPT

## 2021-10-04 ENCOUNTER — APPOINTMENT (OUTPATIENT)
Dept: MATERNAL FETAL MEDICINE | Facility: CLINIC | Age: 21
End: 2021-10-04
Payer: COMMERCIAL

## 2021-10-04 ENCOUNTER — ASOB RESULT (OUTPATIENT)
Age: 21
End: 2021-10-04

## 2021-10-04 PROCEDURE — G0108 DIAB MANAGE TRN  PER INDIV: CPT | Mod: 95

## 2021-10-05 ENCOUNTER — APPOINTMENT (OUTPATIENT)
Dept: ANTEPARTUM | Facility: CLINIC | Age: 21
End: 2021-10-05
Payer: COMMERCIAL

## 2021-10-05 ENCOUNTER — INPATIENT (INPATIENT)
Facility: HOSPITAL | Age: 21
LOS: 2 days | Discharge: HOME | DRG: 832 | End: 2021-10-08
Attending: OBSTETRICS & GYNECOLOGY | Admitting: OBSTETRICS & GYNECOLOGY
Payer: COMMERCIAL

## 2021-10-05 ENCOUNTER — ASOB RESULT (OUTPATIENT)
Age: 21
End: 2021-10-05

## 2021-10-05 ENCOUNTER — NON-APPOINTMENT (OUTPATIENT)
Age: 21
End: 2021-10-05

## 2021-10-05 VITALS — OXYGEN SATURATION: 100 %

## 2021-10-05 DIAGNOSIS — Z3A.00 WEEKS OF GESTATION OF PREGNANCY NOT SPECIFIED: ICD-10-CM

## 2021-10-05 DIAGNOSIS — I10 ESSENTIAL (PRIMARY) HYPERTENSION: ICD-10-CM

## 2021-10-05 DIAGNOSIS — E10.9 TYPE 1 DIABETES MELLITUS WITHOUT COMPLICATIONS: ICD-10-CM

## 2021-10-05 DIAGNOSIS — O26.899 OTHER SPECIFIED PREGNANCY RELATED CONDITIONS, UNSPECIFIED TRIMESTER: ICD-10-CM

## 2021-10-05 DIAGNOSIS — Z34.80 ENCOUNTER FOR SUPERVISION OF OTHER NORMAL PREGNANCY, UNSPECIFIED TRIMESTER: ICD-10-CM

## 2021-10-05 DIAGNOSIS — E78.5 HYPERLIPIDEMIA, UNSPECIFIED: ICD-10-CM

## 2021-10-05 LAB
ALBUMIN SERPL ELPH-MCNC: 3.3 G/DL — SIGNIFICANT CHANGE UP (ref 3.3–5)
ALP SERPL-CCNC: 287 U/L — HIGH (ref 40–120)
ALT FLD-CCNC: 63 U/L — HIGH (ref 10–45)
ANION GAP SERPL CALC-SCNC: 16 MMOL/L — SIGNIFICANT CHANGE UP (ref 5–17)
APTT BLD: 24.3 SEC — LOW (ref 27.5–35.5)
AST SERPL-CCNC: 53 U/L — HIGH (ref 10–40)
BILIRUB SERPL-MCNC: 0.4 MG/DL — SIGNIFICANT CHANGE UP (ref 0.2–1.2)
BUN SERPL-MCNC: 8 MG/DL — SIGNIFICANT CHANGE UP (ref 7–23)
CALCIUM SERPL-MCNC: 9.1 MG/DL — SIGNIFICANT CHANGE UP (ref 8.4–10.5)
CHLORIDE SERPL-SCNC: 104 MMOL/L — SIGNIFICANT CHANGE UP (ref 96–108)
CO2 SERPL-SCNC: 18 MMOL/L — LOW (ref 22–31)
CREAT SERPL-MCNC: 0.56 MG/DL — SIGNIFICANT CHANGE UP (ref 0.5–1.3)
FIBRINOGEN PPP-MCNC: 896 MG/DL — HIGH (ref 290–520)
GLUCOSE BLDC GLUCOMTR-MCNC: 103 MG/DL — HIGH (ref 70–99)
GLUCOSE BLDC GLUCOMTR-MCNC: 116 MG/DL — HIGH (ref 70–99)
GLUCOSE BLDC GLUCOMTR-MCNC: 147 MG/DL — HIGH (ref 70–99)
GLUCOSE BLDC GLUCOMTR-MCNC: 151 MG/DL — HIGH (ref 70–99)
GLUCOSE BLDC GLUCOMTR-MCNC: 69 MG/DL — LOW (ref 70–99)
GLUCOSE BLDC GLUCOMTR-MCNC: 70 MG/DL — SIGNIFICANT CHANGE UP (ref 70–99)
GLUCOSE SERPL-MCNC: 63 MG/DL — LOW (ref 70–99)
HCT VFR BLD CALC: 29.3 % — LOW (ref 34.5–45)
HGB BLD-MCNC: 9.3 G/DL — LOW (ref 11.5–15.5)
MCHC RBC-ENTMCNC: 28.5 PG — SIGNIFICANT CHANGE UP (ref 27–34)
MCHC RBC-ENTMCNC: 31.7 GM/DL — LOW (ref 32–36)
MCV RBC AUTO: 89.9 FL — SIGNIFICANT CHANGE UP (ref 80–100)
NRBC # BLD: 0 /100 WBCS — SIGNIFICANT CHANGE UP (ref 0–0)
PLATELET # BLD AUTO: 331 K/UL — SIGNIFICANT CHANGE UP (ref 150–400)
POTASSIUM SERPL-MCNC: 4.5 MMOL/L — SIGNIFICANT CHANGE UP (ref 3.5–5.3)
POTASSIUM SERPL-SCNC: 4.5 MMOL/L — SIGNIFICANT CHANGE UP (ref 3.5–5.3)
PROT SERPL-MCNC: 6.9 G/DL — SIGNIFICANT CHANGE UP (ref 6–8.3)
RBC # BLD: 3.26 M/UL — LOW (ref 3.8–5.2)
RBC # FLD: 14.9 % — HIGH (ref 10.3–14.5)
SARS-COV-2 RNA SPEC QL NAA+PROBE: SIGNIFICANT CHANGE UP
SODIUM SERPL-SCNC: 138 MMOL/L — SIGNIFICANT CHANGE UP (ref 135–145)
URATE SERPL-MCNC: 3.7 MG/DL — SIGNIFICANT CHANGE UP (ref 2.5–7)
WBC # BLD: 6.6 K/UL — SIGNIFICANT CHANGE UP (ref 3.8–10.5)
WBC # FLD AUTO: 6.6 K/UL — SIGNIFICANT CHANGE UP (ref 3.8–10.5)

## 2021-10-05 PROCEDURE — 99223 1ST HOSP IP/OBS HIGH 75: CPT

## 2021-10-05 PROCEDURE — 99232 SBSQ HOSP IP/OBS MODERATE 35: CPT

## 2021-10-05 PROCEDURE — 76818 FETAL BIOPHYS PROFILE W/NST: CPT

## 2021-10-05 PROCEDURE — 99221 1ST HOSP IP/OBS SF/LOW 40: CPT

## 2021-10-05 RX ORDER — INSULIN ASPART 100 [IU]/ML
1 INJECTION, SOLUTION SUBCUTANEOUS
Refills: 0 | Status: DISCONTINUED | OUTPATIENT
Start: 2021-10-05 | End: 2021-10-06

## 2021-10-05 RX ORDER — DEXTROSE 50 % IN WATER 50 %
15 SYRINGE (ML) INTRAVENOUS ONCE
Refills: 0 | Status: DISCONTINUED | OUTPATIENT
Start: 2021-10-05 | End: 2021-10-08

## 2021-10-05 RX ORDER — DEXTROSE 50 % IN WATER 50 %
12.5 SYRINGE (ML) INTRAVENOUS ONCE
Refills: 0 | Status: DISCONTINUED | OUTPATIENT
Start: 2021-10-05 | End: 2021-10-08

## 2021-10-05 RX ORDER — DEXTROSE 50 % IN WATER 50 %
25 SYRINGE (ML) INTRAVENOUS ONCE
Refills: 0 | Status: DISCONTINUED | OUTPATIENT
Start: 2021-10-05 | End: 2021-10-08

## 2021-10-05 RX ORDER — FOLIC ACID 0.8 MG
1 TABLET ORAL DAILY
Refills: 0 | Status: DISCONTINUED | OUTPATIENT
Start: 2021-10-05 | End: 2021-10-05

## 2021-10-05 RX ORDER — SODIUM CHLORIDE 9 MG/ML
1000 INJECTION, SOLUTION INTRAVENOUS
Refills: 0 | Status: DISCONTINUED | OUTPATIENT
Start: 2021-10-05 | End: 2021-10-08

## 2021-10-05 RX ORDER — GLUCAGON INJECTION, SOLUTION 0.5 MG/.1ML
1 INJECTION, SOLUTION SUBCUTANEOUS ONCE
Refills: 0 | Status: DISCONTINUED | OUTPATIENT
Start: 2021-10-05 | End: 2021-10-08

## 2021-10-05 RX ORDER — INFLUENZA VIRUS VACCINE 15; 15; 15; 15 UG/.5ML; UG/.5ML; UG/.5ML; UG/.5ML
0.5 SUSPENSION INTRAMUSCULAR ONCE
Refills: 0 | Status: COMPLETED | OUTPATIENT
Start: 2021-10-05 | End: 2021-10-05

## 2021-10-05 RX ORDER — ASPIRIN/CALCIUM CARB/MAGNESIUM 324 MG
81 TABLET ORAL DAILY
Refills: 0 | Status: DISCONTINUED | OUTPATIENT
Start: 2021-10-05 | End: 2021-10-08

## 2021-10-05 RX ORDER — HEPARIN SODIUM 5000 [USP'U]/ML
5000 INJECTION INTRAVENOUS; SUBCUTANEOUS EVERY 12 HOURS
Refills: 0 | Status: DISCONTINUED | OUTPATIENT
Start: 2021-10-05 | End: 2021-10-08

## 2021-10-05 NOTE — OB PROVIDER H&P - HISTORY OF PRESENT ILLNESS
Pt is a 20yo  @34.3wks w/ T1DM presenting today from the [facility] office for low CARMEN (3.64), BPP 8/10 (-2 for breathing), & poor blood glucose control. Pt c/o mild cramping, denies LOF, VB. Endorses good fetal movement. Pt denies other concerns at this time.    PNC: GBS [facility], EFW 2wks ago 5#, followed by [facility] for h/o T1DM, pt on omnipod insulin pump, insulin settings below; h/o tachycardia this pregnancy-seen by PCP (who is a cardiologist), ECHO wnl    BG target: 120  Insulin to carb ratio: 6  Correction factor: 55  Basal Rates: 12A- 2.3u/h, 3A- 2.4u/hr, 10AM- 2.3u/hr    ObHx: TOPx1 s/p D&C Oct 2020  GynHx: denies h/o fibroids, abnormal cervical exams, STIs  PMH: T1DM diagnosed at 11yo (hospitalized at that time for DKA, no hospitalizations since that time), A1c 6.8%   PSH: D&C  Meds: PNV, novolog insulin  Allx: ciprofloxacin (intolerance->vomiting)  Psych: denies  Social: denies h/o tobacco, drug, alcohol use this pregnancy     Pt is a 22yo  @34.3wks w/ T1DM presenting today from the [facility] office for low CARMEN (3.64) on [facility] ultrasound today, BPP 8/10 (-2 for breathing), & poor blood glucose control. Pt c/o mild cramping, denies LOF, VB. Endorses good fetal movement. Pt denies other concerns at this time.    PNC: GBS , EFW : EFW 2225g (68th percentile), followed by [facility] for h/o T1DM, pt on omnipod insulin pump (novolog), insulin settings below; h/o tachycardia this pregnancy-seen by PCP (who is a cardiologist), ECHO @ [facility] from  wnl, EF=68%    BG target: 120  Insulin to carb ratio: 6  Correction factor: 55  Basal Rates: 12A- 2.3u/h, 3A- 2.4u/hr, 10AM- 2.3u/hr    ObHx: TOPx1 s/p D&C Oct 2020  GynHx: denies h/o fibroids, abnormal cervical exams, STIs  PMH: T1DM diagnosed at 11yo (hospitalized at that time for DKA, no hospitalizations since that time), A1c 6.8%   PSH: D&C  Meds: PNV, novolog insulin  Allx: ciprofloxacin (intolerance->vomiting)  Psych: denies  Social: denies h/o tobacco, drug, alcohol use this pregnancy

## 2021-10-05 NOTE — OB PROVIDER H&P - ASSESSMENT
Pt is a 20yo  @34.3wks w/ T1DM presenting today from the [facility] office for low CARMEN (3.64), BPP 8/10 (-2 for breathing), & poor blood glucose control. Pt being admitted to antepartum service for blood glucose control & fetal monitoring.     PLAN:    #T1DM  -BG in office today 54, on admission 70  -continue current insulin pump settings (see below)   -monitor blood glucose  -check 1hr postprandial BG  -regular diabetic diet  -[facility] consult placed, d/w Dr. Sparks  -nutrition consult placed  -endocrine consult placed    Insulin Pump Settings:   BG target: 120  Insulin to carb ratio: 6  Correction factor: 55  Basal Rates: 12A- 2.3u/h, 3A- 2.4u/hr, 10AM- 2.3u/hr    #low CARMEN (3.64)  -nitrazine negative, ferning negative  -possibly due to dehydration 2/2 poorly controlled diabetes     #PNC  -no BMZ at this time  -no GBS ppx abx at this time  -1hr NST BID   -uterine irritability noted on admission NST, UA & Ucx ordered  -f/u admission labs (CBC,CMP,COVID,uric acid)  -social work consult placed    Tru Dominique,PGY2  D/w Dr. Corbett & Dr. Latham Pt is a 22yo  @34.3wks w/ T1DM presenting today from the [facility] office for low CARMEN (3.64), BPP 8/10 (-2 for breathing), & poor blood glucose control. Pt being admitted to antepartum service for blood glucose control & fetal monitoring.     PLAN:    #T1DM  -BG in office today 54, on admission 70  -pts fasting BGs for the past few weeks 110s-150s, preprandial 180s  -A1c from  6.8%  -continue current insulin pump settings (see below)   -monitor blood glucose  -check 1hr postprandial BG  -regular diabetic diet  -[facility] consult placed, d/w Dr. Sparks  -nutrition consult placed  -endocrine consult placed    Insulin Pump Settings:   BG target: 120  Insulin to carb ratio: 6  Correction factor: 55  Basal Rates: 12A- 2.3u/h, 3A- 2.4u/hr, 10AM- 2.3u/hr    #low CARMEN (3.64)  -nitrazine negative, ferning negative  -possibly due to dehydration 2/2 poorly controlled diabetes     #PNC  -no BMZ at this time  -no GBS ppx abx at this time, GBS swab performed today, f/u result  -1hr NST BID   -uterine irritability noted on admission NST, UA & Ucx ordered  -f/u admission labs (CBC,CMP,COVID,uric acid)  -social work consult placed    Tru Dominique,PGY2  D/w Dr. Corbett & Dr. Latham

## 2021-10-05 NOTE — CONSULT NOTE ADULT - SUBJECTIVE AND OBJECTIVE BOX
HPI:  Pt is a 22yo  @34.3wks w/ T1DM presenting today from the [facility] office for low CARMEN (3.64), BPP 8/10 (-2 for breathing), & poor blood glucose control. Pt c/o mild cramping, denies LOF, VB. Endorses good fetal movement. Pt denies other concerns at this time.    DM diagnosis:  Last A1c:  Endocrinologist:  Insulin pump: Omnipod  Insulin used:   CGM:   Supply:  Location of pump:  Location of CGM:  Site due to be changed:  Basal Rates: 12A- 2.3u/h, 3A- 2.4u/hr, 10AM- 2.3u/hr  Insulin to carb ratio: 6  Correction factor: 55  BGT: 120  IOB:   Attestation form: Completed  Backup insulin at home in case of pump malfunction: Patient does not have  Glucagon at home:  Medical alert bracelet:  Microvascular complications: Renal, ophthalmologic, neuropathy  Macrovascular complications: CVA, MI, PAD  BS range:  Symptoms:  Diet at home:  Appetite in the hospital/finishing meals:  PMHx: As above  PSHx:  FHx:  SHx:  Insurance:  Resides in:      ObHx: TOPx1 s/p D&C Oct 2020  GynHx: denies h/o fibroids, abnormal cervical exams, STIs  PMH: T1DM diagnosed at 13yo (hospitalized at that time for DKA, no hospitalizations since that time), A1c 6.8%   PSH: D&C  Meds: PNV, novolog insulin  Allx: ciprofloxacin (intolerance->vomiting)  Psych: denies  Social:      (05 Oct 2021 14:23)      PAST MEDICAL & SURGICAL HISTORY:  Type 1 diabetes    Type 1 diabetes    No significant past surgical history    No significant past surgical history        FAMILY HISTORY:  No pertinent family history in first degree relatives    Family history of hypothyroidism (Mother)        Social History: denies h/o tobacco, drug, alcohol use this pregnancy    Outpatient Medications: Home Medications:  omnipod insulin pump: Max basal rate set to 1.5 units. (15 Dec 2020 13:04)      MEDICATIONS  (STANDING):  dextrose 40% Gel 15 Gram(s) Oral once  dextrose 5%. 1000 milliLiter(s) (50 mL/Hr) IV Continuous <Continuous>  dextrose 5%. 1000 milliLiter(s) (100 mL/Hr) IV Continuous <Continuous>  dextrose 50% Injectable 25 Gram(s) IV Push once  dextrose 50% Injectable 12.5 Gram(s) IV Push once  dextrose 50% Injectable 25 Gram(s) IV Push once  folic acid 1 milliGRAM(s) Oral daily  glucagon  Injectable 1 milliGRAM(s) IntraMuscular once  heparin   Injectable 5000 Unit(s) SubCutaneous every 12 hours  influenza   Vaccine 0.5 milliLiter(s) IntraMuscular once  insulin aspart (NovoLOG) Pump 1 Each SubCutaneous Continuous Pump  prenatal multivitamin 1 Tablet(s) Oral daily    MEDICATIONS  (PRN):      Allergies    Cipro (Vomiting)    Intolerances      Review of Systems:  Constitutional: No fever, chills   Neuro: No tremors, headache   Cardiovascular: No chest pain, palpitations  Respiratory: No SOB, no cough  GI: No nausea, vomiting, abdominal pain  : No dysuria, polyuria   Skin: no rash, ulcers   Psych: no depression, anxiety   Endocrine: no polyphagia, polydipsia     ALL OTHER SYSTEMS REVIEWED AND NEGATIVE        PHYSICAL EXAM:  VITALS: T(C): 36.7 (10-05-21 @ 14:33)  T(F): 98.1 (10-05-21 @ 14:33), Max: 98.1 (10-05-21 @ 13:06)  HR: 116 (10-05-21 @ 15:22) (97 - 119)  BP: 114/75 (10-05-21 @ 14:33) (114/75 - 114/75)  RR:  (18 - 20)  SpO2:  (93% - 100%)  Wt(kg): --  GENERAL: NAD, well-groomed, well-developed  EYES: No proptosis, anicteric  HEENT:  Atraumatic, Normocephalic, moist mucous membranes  THYROID: Normal size, no palpable nodules  RESPIRATORY: Clear to auscultation bilaterally; No rales, rhonchi, wheezing  CARDIOVASCULAR: Regular rate and rhythm; No murmurs  GI: Soft, nontender, non distended, normal bowel sounds  SKIN: Dry, intact, No rashes or lesions  NEURO: AOx3, moves all extremities spontaneously   PSYCH: Reactive affect, euthymic mood    POCT Blood Glucose.: 70 mg/dL (10-05-21 @ 12:52)                  Thyroid Function Tests:              Radiology:                HPI:  Pt is a 22yo  @34.3wks w/ T1DM presenting today from the [facility] office for low CARMEN (3.64), BPP 8/10 (-2 for breathing), & poor blood glucose control. Wears an insulin pump. Endocrine consulted for T1DM management.    DM diagnosis: , age 11  Last A1c: 6.8% in 2021  Endocrinologist: Dr. Lind  Insulin pump: Omnipod  Insulin used: Novolog  CGM: None  Supply: 6 days  Location of pump: left thigh  Site due to be changed: tomorrow  Basal Rates: This Monday rates were increased due to hyperglycemia. Current rates: 12A- 2.3u/h, 3A- 2.4u/hr, 10AM- 2.3u/hr.  Insulin to carb ratio: 6  Correction factor: 55  BGT: 120  IOB: 4 hours  Attestation form: Completed  Backup insulin at home in case of pump malfunction: Patient has  Glucagon at home: Patient has  Medical alert bracelet: Patient does not have  Microvascular complications: No known renal complications. No retinopathy though is due for dilated eye exam. No neuropathy.  Macrovascular complications: None  BS range: 10/3: 9pm 176, 3:38pm 200, 10:41 am 200. 10/4: 2:36pm 191, 11:42am 208. 10/5: 3:03pm 97, 11:05am 53. Patient has been checking more frequently but her omnipod did not register the other values.  Symptoms: With fasting value of 53 this morning she had shakes, which she feels when her BG is under 60. No sxs of highs. No other lows other than this morning. She has not been eating well the past couple days.  Diet at home: [place]  Appetite in the hospital/finishing meals: First ate today around 5pm but finished meal.    Plan for this admission is to get BG under better control as patient with recent highs then a low this morning. Per patient, OB informed her that there is no intention to induce labor at this time.    PAST MEDICAL & SURGICAL HISTORY:  Type 1 diabetes    Type 1 diabetes    s/p D&C Oct 2020        FAMILY HISTORY:  Family history of hypothyroidism (Mother)  Grandmother with DM2        Social History: denies tobacco/alcohol use    Outpatient Medications: Home Medications:  omnipod insulin pump: Max basal rate set to 1.5 units. (15 Dec 2020 13:04)      MEDICATIONS  (STANDING):  dextrose 40% Gel 15 Gram(s) Oral once  dextrose 5%. 1000 milliLiter(s) (50 mL/Hr) IV Continuous <Continuous>  dextrose 5%. 1000 milliLiter(s) (100 mL/Hr) IV Continuous <Continuous>  dextrose 50% Injectable 25 Gram(s) IV Push once  dextrose 50% Injectable 12.5 Gram(s) IV Push once  dextrose 50% Injectable 25 Gram(s) IV Push once  folic acid 1 milliGRAM(s) Oral daily  glucagon  Injectable 1 milliGRAM(s) IntraMuscular once  heparin   Injectable 5000 Unit(s) SubCutaneous every 12 hours  influenza   Vaccine 0.5 milliLiter(s) IntraMuscular once  insulin aspart (NovoLOG) Pump 1 Each SubCutaneous Continuous Pump  prenatal multivitamin 1 Tablet(s) Oral daily    MEDICATIONS  (PRN):      Allergies    Cipro (Vomiting)    Intolerances      Review of Systems:  Constitutional:  No fever, No chills.  Eye:  No blurring, No double vision.   Ear/Nose/Mouth/Throat:  No nasal congestion, No sore throat.   Respiratory:  No shortness of breath, No cough, No sputum production, No hemoptysis.   Cardiovascular:  No chest pain, no edema.   Gastrointestinal:  No abdominal pain, No nausea, No vomiting, No diarrhea.   Genitourinary:  No urgency, No change in frequency, No dysuria, No hematuria, No change in urine stream, No malodorous urine, No urethral discharge.   Endocrine:  No excessive thirst, No polyuria.   Musculoskeletal:  No back pain, No decreased range of motion.   Integumentary:  No rash, No skin lesion.  Neurologic:  Alert and oriented X4, No numbness, No tingling.  Additional ROS reviewed and negative except as indicated in HPI.        PHYSICAL EXAM:  VITALS: T(C): 36.7 (10-05-21 @ 14:33)  T(F): 98.1 (10-05-21 @ 14:33), Max: 98.1 (10-05-21 @ 13:06)  HR: 116 (10-05-21 @ 15:22) (97 - 119)  BP: 114/75 (10-05-21 @ 14:33) (114/75 - 114/75)  RR:  (18 - 20)  SpO2:  (93% - 100%)  Wt(kg): --  General: Well-developed female, No acute distress, Speaking full sentences.   Eye:  Extraocular movements are intact, No proptosis or lid lag.   HENT:  Normocephalic.   Neck:  Supple, Non-tender.   Respiratory:  Respirations are non-labored, Symmetric chest wall expansion.   Cardiovascular:  Borderline tachycardic, Regular rhythm, No edema.  Gastrointestinal:  Non-tender, gravid abd.   Musculoskeletal:  Normal range of motion, No gross joint swelling.   Feet:  Normal by visual exam, Normal pulses, No ulcers, Sensation intact.   Integumentary:  Warm, dry.  Mental Status Exam:  Orientedx4, Speech clear and coherent.   Neurologic:  Alert, Orientedx4, Normal motor function, No focal deficits, Cranial Nerves II-XII are grossly intact bilaterally.   Psychiatric:  Cooperative, Appropriate mood & affect.    POCT Blood Glucose.: 70 mg/dL (10-05-21 @ 12:52)                  Thyroid Function Tests:              Radiology:                HPI:  Pt is a 20yo  @34.3wks w/ T1DM presenting today from the [facility] office for low CARMEN (3.64), BPP 8/10 (-2 for breathing), & poor blood glucose control. Wears an insulin pump. Endocrine consulted for T1DM management.    DM diagnosis: , age 11  Last A1c: 6.8% in 2021  Endocrinologist: Dr. Lind  Insulin pump: Omnipod  Insulin used: Novolog  CGM: None  Supply: 6 days  Location of pump: left thigh  Site due to be changed: tomorrow  Basal Rates: This Monday rates were increased due to hyperglycemia. Current rates: 12A- 2.3u/h, 3A- 2.4u/hr, 10AM- 2.3u/hr.  Insulin to carb ratio: 6  Correction factor: 55  BGT: 120  IOB: 4 hours  Attestation form: Completed  Backup insulin at home in case of pump malfunction: Patient has  Glucagon at home: Patient has  Medical alert bracelet: Patient does not have  Microvascular complications: No known renal complications. No retinopathy though is due for dilated eye exam. No neuropathy.  Macrovascular complications: None  BS range: 10/3: 9pm 176, 3:38pm 200, 10:41 am 200. 10/4: 2:36pm 191, 11:42am 208. 10/5: 3:03pm 97, 11:05am 53. Patient has been checking more frequently but her omnipod did not register the other values.  Symptoms: With fasting value of 53 this morning she had shakes, which she feels when her BG is under 60. No sxs of highs. No other lows other than this morning. She has not been eating well the past couple days.  Diet at home: [place]  Appetite in the hospital/finishing meals: First ate today around 5pm but finished meal.    Plan for this admission is to get BG under better control as patient with recent highs then a low this morning. Per patient, OB informed her that there is no intention to induce labor at this time.    PAST MEDICAL & SURGICAL HISTORY:  Type 1 diabetes  s/p D&C Oct 2020        FAMILY HISTORY:  Family history of hypothyroidism (Mother)  Grandmother with DM2        Social History: denies tobacco/alcohol use    Outpatient Medications: Home Medications:  omnipod insulin pump: Max basal rate set to 1.5 units. (15 Dec 2020 13:04)      MEDICATIONS  (STANDING):  dextrose 40% Gel 15 Gram(s) Oral once  dextrose 5%. 1000 milliLiter(s) (50 mL/Hr) IV Continuous <Continuous>  dextrose 5%. 1000 milliLiter(s) (100 mL/Hr) IV Continuous <Continuous>  dextrose 50% Injectable 25 Gram(s) IV Push once  dextrose 50% Injectable 12.5 Gram(s) IV Push once  dextrose 50% Injectable 25 Gram(s) IV Push once  folic acid 1 milliGRAM(s) Oral daily  glucagon  Injectable 1 milliGRAM(s) IntraMuscular once  heparin   Injectable 5000 Unit(s) SubCutaneous every 12 hours  influenza   Vaccine 0.5 milliLiter(s) IntraMuscular once  insulin aspart (NovoLOG) Pump 1 Each SubCutaneous Continuous Pump  prenatal multivitamin 1 Tablet(s) Oral daily    MEDICATIONS  (PRN):      Allergies  Cipro (Vomiting)          Review of Systems:  Constitutional:  No fever, No chills.  Eye:  No blurring, No double vision.   Ear/Nose/Mouth/Throat:  No nasal congestion, No sore throat.   Respiratory:  No shortness of breath, No cough, No sputum production, No hemoptysis.   Cardiovascular:  No chest pain, no edema.   Gastrointestinal:  No abdominal pain, No nausea, No vomiting, No diarrhea.   Genitourinary:  No urgency, No change in frequency, No dysuria, No hematuria, No change in urine stream, No malodorous urine, No urethral discharge.   Endocrine:  No excessive thirst, No polyuria.   Musculoskeletal:  No back pain, No decreased range of motion.   Integumentary:  No rash, No skin lesion.  Neurologic:  Alert and oriented X4, No numbness, No tingling.  Additional ROS reviewed and negative except as indicated in HPI.        PHYSICAL EXAM:  VITALS: T(C): 36.7 (10-05-21 @ 14:33)  T(F): 98.1 (10-05-21 @ 14:33), Max: 98.1 (10-05-21 @ 13:06)  HR: 116 (10-05-21 @ 15:22) (97 - 119)  BP: 114/75 (10-05-21 @ 14:33) (114/75 - 114/75)  RR:  (18 - 20)  SpO2:  (93% - 100%)  Wt(kg): --  General: Well-developed female, No acute distress, Speaking full sentences.   Eye:  Extraocular movements are intact, No proptosis.   HENT:  Normocephalic.   Neck:  Supple, Non-tender.   Respiratory:  Respirations are non-labored, Symmetric chest wall expansion.   Cardiovascular:  Borderline tachycardic, Regular rhythm, No edema.  Gastrointestinal:  Non-tender, gravid abd.   Musculoskeletal:  Normal range of motion, No gross joint swelling.   Feet:  Normal by visual exam, Normal pulses, No ulcers, Sensation intact.   Integumentary:  Warm, dry. POD on L thigh.  Mental Status Exam:  Orientedx4, Speech clear and coherent.   Psychiatric:  Cooperative, euthymic mood & reactive affect.    POCT Blood Glucose.: 70 mg/dL (10-05-21 @ 12:52)

## 2021-10-05 NOTE — OB PROVIDER H&P - ATTENDING COMMENTS
20yo  P0 @ 34 wks admitted w oligohydramnios and poorly compliant DM1  admitted for observation and titration to insulin pump   MFM and endo im-put appreciated    reviewed plan w pt and her mother  strict diabetic diet  stressed eating regularly    discussed LGA and low fluid symptom of disease and responsibility of pt to be  compliant for benefit  self and fetus

## 2021-10-05 NOTE — CONSULT NOTE ADULT - ATTENDING COMMENTS
Patient with mother and father at the bedside. Her mom had GDM with both the patient and her brother. The patient was 6lbs and 12oz and her brother was 7 pounds. The patient notes that she has been testing her BS 7x/day with her Omnipod PDM however it only shows 2-3 readings/day. She used to use a DEXcom but was told that its not accurate enough so she should not use it.   Reviewed with Nursing how to launch the parameters and patient was given forms to complete. Pump order placed by primary team. Next Pod change will be tomorrow.   Her severe hypoglycemia earlier today was due to her not eating so I don't think that she needs less insulin but will likely need more as her post-prandial one hour after dinner was 150 and it should be less than 140.  She has outpt f/u with Dr. Cristina Lind at our office, [address] [place] [id number].  Maria Esther Redd MD  Endocrinology Attending  Mondays and Tuesdays 9am-6pm: [phone] (pager)  Other days, night and weekend: [phone]

## 2021-10-05 NOTE — OB RN TRIAGE NOTE - CHIEF COMPLAINT QUOTE
"I came from the office and they said that my fluid was low, I think 1.4, the baby wasn't breathing that much, and my sugars were high"

## 2021-10-05 NOTE — CONSULT NOTE ADULT - SUBJECTIVE AND OBJECTIVE BOX
22 yo P0 at 34w3d w/ hx of pregestational type 1 DM presents on referral from office in the setting of poor control and noncompliance. MFM consulted for management recommendations.    Patient seen at bedside w/ MFM attending Dr. Rodriguez.  She reports feeling well, states she has mild abdominal cramping, denies fever/chills, n/v, LOF, VB and reports decreased fetal movements.   She states for the past 2 weeks her fasting fingersticks have been around 110-150s, preprandial 180s and postprandial 200s. She missed a couple of appointments with our diabetes in pregnancy program as well. She states she does not eat 3 meals a day and does not have snacks.  865 ultrasound performed today: CARMEN 3.64 (MPV 3.64)  Last EFW 9/23: EFW 2225g (68th percentile)  Antepartum course also significant for tachycardia, saw cardiology at [facility] and had TTE 9/21 which was unremarkable, EF 67%.  Medical history significant for type 1 diabetes diagnosed at age 12 in the setting of DKA and has not been hospitalized since. She follows with endocrinologist Dr. Lind, last A1c 6.8% in April.    Vital Signs Last 24 Hrs  T(C): 36.7 (05 Oct 2021 14:33), Max: 36.7 (05 Oct 2021 12:52)  T(F): 98.1 (05 Oct 2021 14:33), Max: 98.1 (05 Oct 2021 13:06)  HR: 101 (05 Oct 2021 15:32) (97 - 119)  BP: 114/75 (05 Oct 2021 14:33) (114/75 - 114/75)  BP(mean): --  RR: 20 (05 Oct 2021 14:33) (18 - 20)  SpO2: 99% (05 Oct 2021 15:32) (93% - 100%)  GA: NAD, A+Ox3  EFM: NST reactive without decelerations  Abd: soft, gravid, NT on palpation   20 yo P0 at 34w3d w/ hx of pregestational type 1 DM presents on referral from office in the setting of poor control and noncompliance. MFM consulted for management recommendations.    Patient seen at bedside w/ MFM attending Dr. Rodriguez.  She reports feeling well, states she has mild abdominal cramping, denies fever/chills, n/v, LOF, VB and reports decreased fetal movements.   She states for the past 2 weeks her fasting fingersticks have been around 110-150s, preprandial 180s and postprandial 200s. She missed a couple of appointments with our diabetes in pregnancy program as well. She states she does not eat 3 meals a day and does not have snacks.  865 ultrasound performed today: cephalic, CARMEN 3.64 (MPV 3.64)  Last EFW 9/23: EFW 2225g (68th percentile)  Antepartum course also significant for tachycardia, saw cardiology at [facility] and had TTE 9/21 which was unremarkable, EF 67%.  Medical history significant for type 1 diabetes diagnosed at age 12 in the setting of DKA and has not been hospitalized since. She follows with endocrinologist Dr. Lind, last A1c 6.8% in April, currently on insulin pump.    Vital Signs Last 24 Hrs  T(C): 36.7 (05 Oct 2021 14:33), Max: 36.7 (05 Oct 2021 12:52)  T(F): 98.1 (05 Oct 2021 14:33), Max: 98.1 (05 Oct 2021 13:06)  HR: 101 (05 Oct 2021 15:32) (97 - 119)  BP: 114/75 (05 Oct 2021 14:33) (114/75 - 114/75)  BP(mean): --  RR: 20 (05 Oct 2021 14:33) (18 - 20)  SpO2: 99% (05 Oct 2021 15:32) (93% - 100%)  GA: NAD, A+Ox3  EFM: NST reactive without decelerations  Abd: soft, gravid, NT on palpation  Extrem: no calf tenderness    CAPILLARY BLOOD GLUCOSE    POCT Blood Glucose.: 69 mg/dL (05 Oct 2021 15:40)    Labs pending

## 2021-10-05 NOTE — CONSULT NOTE ADULT - ASSESSMENT
22 yo P0 at 34w3d w/ hx of pregestational type 1 DM admitted for observation in the setting of poor control and compliance, stable  - regarding pregestational diabetes, fingerstick on arrival 70, patient has not eaten today and has been utilizing insulin pump, no current signs of DKA, labs pending, endocrinology consultation suggested, 24 hours of fingertsick monitoring and insulin titration as indicated is appropriate in this setting, nutrition consultation also suggested given patients poor compliance with diet recommendations  - regarding low CARMEN, patient ruled out of PPROM, last EFW AGA, fetal monitoring reviewed, NST reactive w/o decelerations, BPP 8/10 (-2 for breathing) in office, will repeat ultrasound evaluation tomorrow, late   corticosteroid administration not suggested as benefit of late  steroids in patients with pregestational diabetes is unknown and patient has documented poor glycemic control  - suggest SQH for DVT prophylaxis
Pt is a 22yo  @34.3wks w/ T1DM presenting today from the [facility] office for low CARMEN (3.64), BPP 8/10 (-2 for breathing), & poor blood glucose control. Wears an insulin pump. Endocrine consulted for T1DM management.    T1DM with history of poor control  -Fasting BG target 60-95  -One-hour postprandial BG goal 140 or less  -FSBG pre-meals, one-hour postprandial and at bedtime  -Insulin pump settings:   Basal Rates: 12A- 2.3u/h, 3A- 2.4u/hr, 10AM- 2.3u/hr.  Insulin to carb ratio: 6  Correction factor: 55  BGT: 120  IOB: 4 hours  -Set temp basal for 90% of currently programmed home rates. Temp basal will  at around 5am. Will make further adjustments tomorrow as needed.  -Can use admelog in the pump for now, resume novolog at home  -Carb consistent diet  Discharge plan:  -Plan to discharge on insulin pump. Final rates pending clinical course.  -Recommend outpatient routine ophthalmology f/u and endocrinology f/u outpatient. Patient follows with Dr. Lind for Endocrinology.    HTN  -Outpatient goal BP <130/80. Management per primary team.    HLD  -Statin contraindicated in pregnancy    Eduardo Nino DO, Endocrinology Fellow  Pager [phone] from 9am to 5pm. After hours and on weekends, please call [phone].

## 2021-10-06 ENCOUNTER — APPOINTMENT (OUTPATIENT)
Dept: ANTEPARTUM | Facility: CLINIC | Age: 21
End: 2021-10-06

## 2021-10-06 LAB
A1C WITH ESTIMATED AVERAGE GLUCOSE RESULT: 6.2 % — HIGH (ref 4–5.6)
ALBUMIN SERPL ELPH-MCNC: 3.1 G/DL — LOW (ref 3.3–5)
ALBUMIN SERPL ELPH-MCNC: 3.5 G/DL
ALP BLD-CCNC: 291 U/L
ALP SERPL-CCNC: 253 U/L — HIGH (ref 40–120)
ALT FLD-CCNC: 59 U/L — HIGH (ref 10–45)
ALT SERPL-CCNC: 63 U/L
ANION GAP SERPL CALC-SCNC: 12 MMOL/L
ANION GAP SERPL CALC-SCNC: 13 MMOL/L — SIGNIFICANT CHANGE UP (ref 5–17)
APPEARANCE UR: ABNORMAL
APTT BLD: 24.1 SEC — LOW (ref 27.5–35.5)
AST SERPL-CCNC: 45 U/L — HIGH (ref 10–40)
AST SERPL-CCNC: 47 U/L
BACTERIA # UR AUTO: NEGATIVE — SIGNIFICANT CHANGE UP
BASOPHILS # BLD AUTO: 0.02 K/UL
BASOPHILS # BLD AUTO: 0.03 K/UL — SIGNIFICANT CHANGE UP (ref 0–0.2)
BASOPHILS NFR BLD AUTO: 0.4 %
BASOPHILS NFR BLD AUTO: 0.5 % — SIGNIFICANT CHANGE UP (ref 0–2)
BILIRUB SERPL-MCNC: 0.3 MG/DL — SIGNIFICANT CHANGE UP (ref 0.2–1.2)
BILIRUB SERPL-MCNC: 0.4 MG/DL
BILIRUB UR-MCNC: NEGATIVE — SIGNIFICANT CHANGE UP
BUN SERPL-MCNC: 10 MG/DL
BUN SERPL-MCNC: 12 MG/DL — SIGNIFICANT CHANGE UP (ref 7–23)
CALCIUM SERPL-MCNC: 9.2 MG/DL — SIGNIFICANT CHANGE UP (ref 8.4–10.5)
CALCIUM SERPL-MCNC: 9.5 MG/DL
CHLORIDE SERPL-SCNC: 105 MMOL/L
CHLORIDE SERPL-SCNC: 106 MMOL/L — SIGNIFICANT CHANGE UP (ref 96–108)
CO2 SERPL-SCNC: 19 MMOL/L — LOW (ref 22–31)
CO2 SERPL-SCNC: 20 MMOL/L
COLOR SPEC: YELLOW — SIGNIFICANT CHANGE UP
COVID-19 SPIKE DOMAIN AB INTERP: POSITIVE
COVID-19 SPIKE DOMAIN ANTIBODY RESULT: >250 U/ML — HIGH
CREAT ?TM UR-MCNC: 203 MG/DL — SIGNIFICANT CHANGE UP
CREAT SERPL-MCNC: 0.56 MG/DL — SIGNIFICANT CHANGE UP (ref 0.5–1.3)
CREAT SERPL-MCNC: 0.67 MG/DL
DIFF PNL FLD: NEGATIVE — SIGNIFICANT CHANGE UP
EOSINOPHIL # BLD AUTO: 0.04 K/UL
EOSINOPHIL # BLD AUTO: 0.06 K/UL — SIGNIFICANT CHANGE UP (ref 0–0.5)
EOSINOPHIL NFR BLD AUTO: 0.7 %
EOSINOPHIL NFR BLD AUTO: 0.9 % — SIGNIFICANT CHANGE UP (ref 0–6)
EPI CELLS # UR: 2 /HPF — SIGNIFICANT CHANGE UP
ESTIMATED AVERAGE GLUCOSE: 131 MG/DL — HIGH (ref 68–114)
ESTIMATED AVERAGE GLUCOSE: 137 MG/DL
FIBRINOGEN PPP-MCNC: 688 MG/DL — HIGH (ref 290–520)
GLUCOSE BLDC GLUCOMTR-MCNC: 120 MG/DL — HIGH (ref 70–99)
GLUCOSE BLDC GLUCOMTR-MCNC: 122 MG/DL — HIGH (ref 70–99)
GLUCOSE BLDC GLUCOMTR-MCNC: 145 MG/DL — HIGH (ref 70–99)
GLUCOSE BLDC GLUCOMTR-MCNC: 147 MG/DL — HIGH (ref 70–99)
GLUCOSE BLDC GLUCOMTR-MCNC: 151 MG/DL — HIGH (ref 70–99)
GLUCOSE BLDC GLUCOMTR-MCNC: 53 MG/DL — CRITICAL LOW (ref 70–99)
GLUCOSE BLDC GLUCOMTR-MCNC: 56 MG/DL — LOW (ref 70–99)
GLUCOSE BLDC GLUCOMTR-MCNC: 56 MG/DL — LOW (ref 70–99)
GLUCOSE BLDC GLUCOMTR-MCNC: 57 MG/DL — LOW (ref 70–99)
GLUCOSE BLDC GLUCOMTR-MCNC: 60 MG/DL — LOW (ref 70–99)
GLUCOSE BLDC GLUCOMTR-MCNC: 67 MG/DL — LOW (ref 70–99)
GLUCOSE BLDC GLUCOMTR-MCNC: 79 MG/DL — SIGNIFICANT CHANGE UP (ref 70–99)
GLUCOSE BLDC GLUCOMTR-MCNC: 79 MG/DL — SIGNIFICANT CHANGE UP (ref 70–99)
GLUCOSE BLDC GLUCOMTR-MCNC: 91 MG/DL — SIGNIFICANT CHANGE UP (ref 70–99)
GLUCOSE SERPL-MCNC: 58 MG/DL — LOW (ref 70–99)
GLUCOSE SERPL-MCNC: 67 MG/DL
GLUCOSE UR QL: ABNORMAL
HBA1C MFR BLD HPLC: 6.4 %
HCT VFR BLD CALC: 25.9 % — LOW (ref 34.5–45)
HCT VFR BLD CALC: 31.3 %
HGB BLD-MCNC: 8.4 G/DL — LOW (ref 11.5–15.5)
HGB BLD-MCNC: 9.5 G/DL
HYALINE CASTS # UR AUTO: 42 /LPF — HIGH (ref 0–2)
IMM GRANULOCYTES NFR BLD AUTO: 0.4 %
IMM GRANULOCYTES NFR BLD AUTO: 0.6 % — SIGNIFICANT CHANGE UP (ref 0–1.5)
INR BLD: 0.94 RATIO — SIGNIFICANT CHANGE UP (ref 0.88–1.16)
KETONES UR-MCNC: NEGATIVE — SIGNIFICANT CHANGE UP
LDH SERPL L TO P-CCNC: 149 U/L — SIGNIFICANT CHANGE UP (ref 50–242)
LEUKOCYTE ESTERASE UR-ACNC: ABNORMAL
LYMPHOCYTES # BLD AUTO: 1.54 K/UL
LYMPHOCYTES # BLD AUTO: 2.26 K/UL — SIGNIFICANT CHANGE UP (ref 1–3.3)
LYMPHOCYTES # BLD AUTO: 35.4 % — SIGNIFICANT CHANGE UP (ref 13–44)
LYMPHOCYTES NFR BLD AUTO: 28.1 %
MAN DIFF?: NORMAL
MCHC RBC-ENTMCNC: 28.7 PG
MCHC RBC-ENTMCNC: 29 PG — SIGNIFICANT CHANGE UP (ref 27–34)
MCHC RBC-ENTMCNC: 30.4 GM/DL
MCHC RBC-ENTMCNC: 32.4 GM/DL — SIGNIFICANT CHANGE UP (ref 32–36)
MCV RBC AUTO: 89.3 FL — SIGNIFICANT CHANGE UP (ref 80–100)
MCV RBC AUTO: 94.6 FL
MONOCYTES # BLD AUTO: 0.41 K/UL
MONOCYTES # BLD AUTO: 0.58 K/UL — SIGNIFICANT CHANGE UP (ref 0–0.9)
MONOCYTES NFR BLD AUTO: 7.5 %
MONOCYTES NFR BLD AUTO: 9.1 % — SIGNIFICANT CHANGE UP (ref 2–14)
NEUTROPHILS # BLD AUTO: 3.42 K/UL — SIGNIFICANT CHANGE UP (ref 1.8–7.4)
NEUTROPHILS # BLD AUTO: 3.46 K/UL
NEUTROPHILS NFR BLD AUTO: 53.5 % — SIGNIFICANT CHANGE UP (ref 43–77)
NEUTROPHILS NFR BLD AUTO: 62.9 %
NITRITE UR-MCNC: NEGATIVE — SIGNIFICANT CHANGE UP
NRBC # BLD: 0 /100 WBCS — SIGNIFICANT CHANGE UP (ref 0–0)
PH UR: 6.5 — SIGNIFICANT CHANGE UP (ref 5–8)
PLATELET # BLD AUTO: 286 K/UL — SIGNIFICANT CHANGE UP (ref 150–400)
PLATELET # BLD AUTO: 334 K/UL
POTASSIUM SERPL-MCNC: 3.7 MMOL/L — SIGNIFICANT CHANGE UP (ref 3.5–5.3)
POTASSIUM SERPL-SCNC: 3.7 MMOL/L — SIGNIFICANT CHANGE UP (ref 3.5–5.3)
POTASSIUM SERPL-SCNC: 4.8 MMOL/L
PROT ?TM UR-MCNC: 33 MG/DL — HIGH (ref 0–12)
PROT SERPL-MCNC: 6.1 G/DL — SIGNIFICANT CHANGE UP (ref 6–8.3)
PROT SERPL-MCNC: 6.3 G/DL
PROT UR-MCNC: ABNORMAL
PROT/CREAT UR-RTO: 0.2 RATIO — SIGNIFICANT CHANGE UP (ref 0–0.2)
PROTHROM AB SERPL-ACNC: 11.3 SEC — SIGNIFICANT CHANGE UP (ref 10.6–13.6)
RBC # BLD: 2.9 M/UL — LOW (ref 3.8–5.2)
RBC # BLD: 3.31 M/UL
RBC # FLD: 14.9 % — HIGH (ref 10.3–14.5)
RBC # FLD: 15.3 %
RBC CASTS # UR COMP ASSIST: 2 /HPF — SIGNIFICANT CHANGE UP (ref 0–4)
SARS-COV-2 IGG+IGM SERPL QL IA: >250 U/ML — HIGH
SARS-COV-2 IGG+IGM SERPL QL IA: POSITIVE
SODIUM SERPL-SCNC: 137 MMOL/L
SODIUM SERPL-SCNC: 138 MMOL/L — SIGNIFICANT CHANGE UP (ref 135–145)
SP GR SPEC: 1.03 — HIGH (ref 1.01–1.02)
URATE SERPL-MCNC: 3.7 MG/DL — SIGNIFICANT CHANGE UP (ref 2.5–7)
UROBILINOGEN FLD QL: ABNORMAL
WBC # BLD: 6.39 K/UL — SIGNIFICANT CHANGE UP (ref 3.8–10.5)
WBC # FLD AUTO: 5.49 K/UL
WBC # FLD AUTO: 6.39 K/UL — SIGNIFICANT CHANGE UP (ref 3.8–10.5)
WBC UR QL: 26 /HPF — HIGH (ref 0–5)

## 2021-10-06 PROCEDURE — 99232 SBSQ HOSP IP/OBS MODERATE 35: CPT | Mod: GC

## 2021-10-06 PROCEDURE — 76820 UMBILICAL ARTERY ECHO: CPT | Mod: 26

## 2021-10-06 PROCEDURE — 99232 SBSQ HOSP IP/OBS MODERATE 35: CPT

## 2021-10-06 PROCEDURE — 76818 FETAL BIOPHYS PROFILE W/NST: CPT | Mod: 26

## 2021-10-06 RX ORDER — INSULIN ASPART 100 [IU]/ML
1 INJECTION, SOLUTION SUBCUTANEOUS
Refills: 0 | Status: DISCONTINUED | OUTPATIENT
Start: 2021-10-06 | End: 2021-10-06

## 2021-10-06 RX ORDER — INSULIN ASPART 100 [IU]/ML
1 INJECTION, SOLUTION SUBCUTANEOUS
Refills: 0 | Status: DISCONTINUED | OUTPATIENT
Start: 2021-10-06 | End: 2021-10-07

## 2021-10-06 RX ADMIN — INSULIN ASPART 1 EACH: 100 INJECTION, SOLUTION SUBCUTANEOUS at 18:18

## 2021-10-06 RX ADMIN — HEPARIN SODIUM 5000 UNIT(S): 5000 INJECTION INTRAVENOUS; SUBCUTANEOUS at 17:41

## 2021-10-06 RX ADMIN — HEPARIN SODIUM 5000 UNIT(S): 5000 INJECTION INTRAVENOUS; SUBCUTANEOUS at 06:13

## 2021-10-06 RX ADMIN — Medication 81 MILLIGRAM(S): at 11:56

## 2021-10-06 NOTE — PROGRESS NOTE ADULT - ATTENDING COMMENTS
Patient at 34 weeks admitted for Glucose management. She has poorly controlled Type 1 diabetes  Will continue with insulin managememt/  MFM input per delivery timing.

## 2021-10-06 NOTE — PROGRESS NOTE ADULT - ASSESSMENT
22 yo P0 at 34w4d w/ hx of pregestational type 1 DM admitted for observation in the setting of poor control and compliance, stable  - regarding pregestational diabetes, fingersticks reviewed and notable for hypoglycemia this AM that resolved with treatment, endocrinology consulted, management recommendations regarding pump setting adjustments appreciated, dietician consultation also requested given poor PO intake  - regarding low CARMEN, patient ruled out of PPROM, last EFW AGA, fetal monitoring reviewed, will repeat BPP today and evaluate amniotic fluid volume  - hemoglobin this AM 8.4, patient denies vaginal bleeding or history of anemia, outpatient records reviewed and hemoglobin previously in August was 11.2, iron studies suggested to aid in identifying etiology, hemoglobin electrophoresis from April 2021 was normal  - continue SQH for DVT prophylaxis  - social work consultation requested

## 2021-10-06 NOTE — PROGRESS NOTE ADULT - ATTENDING COMMENTS
Vignesh is a 21-year-old woman with history of type 1 diabetes, currently 34.3 weeks pregnant, presented with hypoglycemia and low intrauterine fluid.  Managed by MFM team.  Patient noted to have hypoglycemia today.  Will adjust insulin pump setting as per endocrine fellow's notes above.  Will decrease basal rate by about 90%.  For now continue to monitor postprandial glucose, may need to adjust insulin to carbohydrate ratio tomorrow for breakfast given hypoglycemia post breakfast.

## 2021-10-06 NOTE — PROGRESS NOTE ADULT - ASSESSMENT
Pt is a 20yo  @34.3wks w/ T1DM presenting today from the [facility] office for low CARMEN (3.64), BPP 8/10 (-2 for breathing), & poor blood glucose control. Wears an insulin pump. Endocrine consulted for T1DM management.    T1DM with history of poor control  Once temp basal  this morning, patient ran low on home rates. Notes that she is eating healthier than at home, namely with fewer carbs. Is motivated to continue eating well once she gets home.  -Fasting BG target 60-95  -One-hour postprandial BG goal 140 or less  -FSBG pre-meals, one-hour postprandial and at bedtime  -Decreased basal rates to 90% home rates today (10/6). The pump is now programmed as follows:   Basal Rates: 12A- 2.1u/h, 3A- 2.15u/hr, 10AM- 2.1u/hr.  Insulin to carb ratio: 6  Correction factor: 55  BGT: 120  IOB: 4 hours  -Can use admelog in the pump for now, resume novolog at home  -Carb consistent diet  Discharge plan:  -Plan to discharge on insulin pump. Final rates pending clinical course.  -Recommend outpatient routine ophthalmology f/u and endocrinology f/u outpatient. Patient follows with Dr. Lind for Endocrinology.    HTN  -Outpatient goal BP <130/80. Management per primary team.    HLD  -Statin contraindicated in pregnancy    Eduardo Nino DO, Endocrinology Fellow  Pager [phone] from 9am to 5pm. After hours and on weekends, please call [phone].

## 2021-10-06 NOTE — PROGRESS NOTE ADULT - ASSESSMENT
22yo HD#2  @ 34w4d w/T1DM a/w oligohydramnios, poor T1DM compliance. Maternal and fetal status reassuring overnight.     #T1DM  - c/w Admelog pump  - c/w ASA  - FS, ISS  - CC reg diet w/evening snack  - Appreciate Endo recs  - f/u UA, UCx, P/C  - f/u Nutrition recs    #Oligohydramnios   - repeat ultrasound today  - 2/2 poor T1DM control  - PPROM r/o 10/5    #Fetal wellbeing  - Late   BMZ not suggested 2/2 risks of poor glycemic control  - f/u GBS 10/5  - NST BID x1hr  - Sono today    #Maternal wellbeing  - No pain  - VTE ppx: HSQ, SCDs, OOB  - f/u SW consult    Myriam Martin R3

## 2021-10-07 LAB
FERRITIN SERPL-MCNC: 13 NG/ML — LOW (ref 15–150)
GLUCOSE BLDC GLUCOMTR-MCNC: 109 MG/DL — HIGH (ref 70–99)
GLUCOSE BLDC GLUCOMTR-MCNC: 112 MG/DL — HIGH (ref 70–99)
GLUCOSE BLDC GLUCOMTR-MCNC: 143 MG/DL — HIGH (ref 70–99)
GLUCOSE BLDC GLUCOMTR-MCNC: 148 MG/DL — HIGH (ref 70–99)
GLUCOSE BLDC GLUCOMTR-MCNC: 149 MG/DL — HIGH (ref 70–99)
GLUCOSE BLDC GLUCOMTR-MCNC: 185 MG/DL — HIGH (ref 70–99)
GLUCOSE BLDC GLUCOMTR-MCNC: 53 MG/DL — CRITICAL LOW (ref 70–99)
GLUCOSE BLDC GLUCOMTR-MCNC: 53 MG/DL — CRITICAL LOW (ref 70–99)
GLUCOSE BLDC GLUCOMTR-MCNC: 60 MG/DL — LOW (ref 70–99)
GLUCOSE BLDC GLUCOMTR-MCNC: 71 MG/DL — SIGNIFICANT CHANGE UP (ref 70–99)
GLUCOSE BLDC GLUCOMTR-MCNC: 91 MG/DL — SIGNIFICANT CHANGE UP (ref 70–99)
GLUCOSE BLDC GLUCOMTR-MCNC: 97 MG/DL — SIGNIFICANT CHANGE UP (ref 70–99)
GROUP B BETA STREP DNA (PCR): DETECTED
GROUP B BETA STREP INTERPRETATION: SIGNIFICANT CHANGE UP
HEMOGLOBIN INTERPRETATION: SIGNIFICANT CHANGE UP
HGB A MFR BLD: 97.8 % — SIGNIFICANT CHANGE UP (ref 95.8–98)
HGB A2 MFR BLD: 2.2 % — SIGNIFICANT CHANGE UP (ref 2–3.2)
IRON SATN MFR SERPL: 38 UG/DL — SIGNIFICANT CHANGE UP (ref 30–160)
IRON SATN MFR SERPL: 6 % — LOW (ref 14–50)
SOURCE GROUP B STREP: SIGNIFICANT CHANGE UP
TIBC SERPL-MCNC: 611 UG/DL — HIGH (ref 220–430)
TRANSFERRIN SERPL-MCNC: 552 MG/DL — HIGH (ref 200–360)
UIBC SERPL-MCNC: 573 UG/DL — HIGH (ref 110–370)

## 2021-10-07 PROCEDURE — 99232 SBSQ HOSP IP/OBS MODERATE 35: CPT

## 2021-10-07 PROCEDURE — 83020 HEMOGLOBIN ELECTROPHORESIS: CPT | Mod: 26

## 2021-10-07 PROCEDURE — 99232 SBSQ HOSP IP/OBS MODERATE 35: CPT | Mod: GC

## 2021-10-07 PROCEDURE — 99232 SBSQ HOSP IP/OBS MODERATE 35: CPT | Mod: 25

## 2021-10-07 RX ORDER — INSULIN ASPART 100 [IU]/ML
1 INJECTION, SOLUTION SUBCUTANEOUS
Refills: 0 | Status: DISCONTINUED | OUTPATIENT
Start: 2021-10-07 | End: 2021-10-08

## 2021-10-07 RX ORDER — IRON SUCROSE 20 MG/ML
300 INJECTION, SOLUTION INTRAVENOUS ONCE
Refills: 0 | Status: COMPLETED | OUTPATIENT
Start: 2021-10-07 | End: 2021-10-07

## 2021-10-07 RX ADMIN — HEPARIN SODIUM 5000 UNIT(S): 5000 INJECTION INTRAVENOUS; SUBCUTANEOUS at 06:06

## 2021-10-07 RX ADMIN — Medication 81 MILLIGRAM(S): at 12:19

## 2021-10-07 RX ADMIN — HEPARIN SODIUM 5000 UNIT(S): 5000 INJECTION INTRAVENOUS; SUBCUTANEOUS at 17:01

## 2021-10-07 RX ADMIN — IRON SUCROSE 176.67 MILLIGRAM(S): 20 INJECTION, SOLUTION INTRAVENOUS at 17:01

## 2021-10-07 NOTE — PROGRESS NOTE ADULT - ASSESSMENT
Pt is a 20yo  @34.3wks w/ T1DM presenting today from the [facility] office for low CARMEN (3.64), BPP 8/10 (-2 for breathing), & poor blood glucose control. Wears an insulin pump. Endocrine consulted for T1DM management.    T1DM with history of poor control  -Fasting BG target 60-95  -One-hour postprandial BG goal 140 or less  -FSBG pre-meals, one-hour postprandial and at bedtime  -Decreased basal rates to 90% home rates yesterday (10/6). Further decreased her 3am basal rate today (10/7). The pump is now programmed as follows:   Basal Rates: 12A- 2.1u/h, 3A- 1.9u/hr, 10AM- 2.1u/hr.  Insulin to carb ratio: 6  Correction factor: 55  BGT: 120  IOB: 4 hours  -Can use admelog in the pump for now, resume novolog at home  -Carb consistent diet  Discharge plan:  -Plan to discharge on insulin pump. Final rates pending clinical course.  -Recommend outpatient routine ophthalmology f/u and endocrinology f/u outpatient. Patient follows with Dr. Lind for Endocrinology.    HTN  -Outpatient goal BP <130/80. Management per primary team.    HLD  -Statin contraindicated in pregnancy    Eduardo Nino DO, Endocrinology Fellow  Pager [phone] from 9am to 5pm. After hours and on weekends, please call [phone].

## 2021-10-07 NOTE — PROGRESS NOTE ADULT - ASSESSMENT
20 yo P0 at 34w5d w/ hx of pregestational type 1 DM admitted for observation in the setting of poor control and compliance, stable  - regarding pregestational diabetes, fingersticks reviewed and notable for hypoglycemia again this AM that resolved with treatment, endocrinology consulted, management recommendations regarding pump setting adjustments appreciated, basal rates were decreased yesterday and will likely need to go down again, contributing factors are related to dietary changes while in hospital as patient admits to eating healthier/balanced meals and less carbohydrates overall, dietician also involved  - previously low CARMEN resolved on ultrasound, CARMEN yesterday 10  - last hemoglobin 8.4, patient denies vaginal bleeding or history of anemia, outpatient records reviewed and hemoglobin previously in August was 11.2, hemoglobin electrophoresis from April 2021 was normal, ferritin level pending  - continue [facility] for DVT prophylaxis  - social work following

## 2021-10-07 NOTE — DIETITIAN INITIAL EVALUATION ADULT. - REASON INDICATOR FOR ASSESSMENT
Consults received for assessment, education, "Pt is DM1 w/ insulin pump, now 34w5d pregnant. Frequent dips in FS." Source: EMR, pt. Pt is a 22 yo female  at 34w5d with T1DM admitted 10/5 with oligohydramnios, poor T1DM compliance.

## 2021-10-07 NOTE — DIETITIAN INITIAL EVALUATION ADULT. - ORAL INTAKE PTA/DIET HISTORY
Per chart review, pt on omnipod insulin pump (novolog) PTA. T1DM diagnosed at 11yo.     Insulin settings PTA per chart: BG target 120, insulin to carb ratio 6, correction factor 55, basal rates 12A- 2.3u/h, 3A- 2.4u/hr, 10AM-2.3u/hr. HbA1c 6.2% suggests good glycemic control PTA.

## 2021-10-07 NOTE — PROGRESS NOTE ADULT - ATTENDING COMMENTS
Agree with assessment and plan as above by Dr. Nino. Reviewed all pertinent labs, glucose values, and imaging studies. Modifications made as indicated above. Decrease overnight basal to 1.9 units/hr given hypoglycemia this morning. Continue to monitor on the rest of her settings. Plan to d/c on insulin pump. Has supplies. Due to change site on 10/9.    Neo Moran D.O  [phone]

## 2021-10-07 NOTE — DIETITIAN INITIAL EVALUATION ADULT. - PERTINENT LABORATORY DATA
HbA1c 6.2% (10/6)    CAPILLARY BLOOD GLUCOSE  POCT Blood Glucose.: 149 mg/dL (07 Oct 2021 09:46)  POCT Blood Glucose.: 97 mg/dL (07 Oct 2021 08:25)  POCT Blood Glucose.: 91 mg/dL (07 Oct 2021 07:37)  POCT Blood Glucose.: 71 mg/dL (07 Oct 2021 07:03)  POCT Blood Glucose.: 60 mg/dL (07 Oct 2021 06:47)  POCT Blood Glucose.: 53 mg/dL (07 Oct 2021 06:27)  POCT Blood Glucose.: 53 mg/dL (07 Oct 2021 06:25)  POCT Blood Glucose.: 145 mg/dL (06 Oct 2021 21:55)  POCT Blood Glucose.: 120 mg/dL (06 Oct 2021 19:35)  POCT Blood Glucose.: 122 mg/dL (06 Oct 2021 14:28)  POCT Blood Glucose.: 79 mg/dL (06 Oct 2021 13:51)  POCT Blood Glucose.: 57 mg/dL (06 Oct 2021 13:32)  POCT Blood Glucose.: 56 mg/dL (06 Oct 2021 13:28)  POCT Blood Glucose.: 147 mg/dL (06 Oct 2021 11:04)

## 2021-10-07 NOTE — DIETITIAN INITIAL EVALUATION ADULT. - OTHER INFO
Pt declined RD interview at this time. Subjective information obtained from comprehensive chart review.     Per Endocrinology (10/6), pt eating well in-house, "Notes that she is eating healthier than at home, namely with fewer carbs and this is why she feels her BG is running a bit low on her home settings." Basal rates decreased to 90% home rates yesterday (10/6), Admelog in-house.     No acute GI distress, chewing/swallowing difficulties noted. NKFA.     Per Patient Profile, pt pre-pregnancy weight 115 pounds, dosing weight 145 pounds, suggesting 30 pound weight gain during pregnancy thus far. Will continue to monitor and trend weights as able.     Pt made aware RD remains available.

## 2021-10-07 NOTE — PROGRESS NOTE ADULT - ASSESSMENT
20yo HD#3  @ 34w5d w/T1DM a/w oligohydramnios, poor T1DM compliance. Maternal and fetal status reassuring overnight.     #T1DM  - c/w Novolog pump  - c/w ASA  - FS, ISS  - CC reg diet w/evening snack  - P/C (10/6): 0.2  - f/u UCx (10/6)  - Appreciate Endo recs  - f/u Nutrition recs    #Oligohydramnios -> sono 10/6 CARMEN 10   - 2/2 poor T1DM control  - PPROM r/o 10/5    #Fetal wellbeing  - Late   BMZ not suggested 2/2 risks of poor glycemic control  - f/u GBS 10/5  - NST BID, BPP x2    #Maternal wellbeing  - No pain  - VTE ppx: HSQ, SCDs, OOB  - f/u SW consult    Myriam Martin R3

## 2021-10-07 NOTE — DIETITIAN INITIAL EVALUATION ADULT. - SIGNS/SYMPTOMS
as evidenced by increased nutrient needs with pregnancy as evidenced by hypo- and hyperglycemia in-house

## 2021-10-08 ENCOUNTER — APPOINTMENT (OUTPATIENT)
Dept: ANTEPARTUM | Facility: CLINIC | Age: 21
End: 2021-10-08

## 2021-10-08 ENCOUNTER — ASOB RESULT (OUTPATIENT)
Age: 21
End: 2021-10-08

## 2021-10-08 ENCOUNTER — TRANSCRIPTION ENCOUNTER (OUTPATIENT)
Age: 21
End: 2021-10-08

## 2021-10-08 VITALS — OXYGEN SATURATION: 100 % | HEART RATE: 95 BPM

## 2021-10-08 LAB
GLUCOSE BLDC GLUCOMTR-MCNC: 101 MG/DL — HIGH (ref 70–99)
GLUCOSE BLDC GLUCOMTR-MCNC: 105 MG/DL — HIGH (ref 70–99)
GLUCOSE BLDC GLUCOMTR-MCNC: 146 MG/DL — HIGH (ref 70–99)
GLUCOSE BLDC GLUCOMTR-MCNC: 155 MG/DL — HIGH (ref 70–99)
GLUCOSE BLDC GLUCOMTR-MCNC: 63 MG/DL — LOW (ref 70–99)
GLUCOSE BLDC GLUCOMTR-MCNC: 81 MG/DL — SIGNIFICANT CHANGE UP (ref 70–99)

## 2021-10-08 PROCEDURE — 87186 SC STD MICRODIL/AGAR DIL: CPT

## 2021-10-08 PROCEDURE — 87635 SARS-COV-2 COVID-19 AMP PRB: CPT

## 2021-10-08 PROCEDURE — 83615 LACTATE (LD) (LDH) ENZYME: CPT

## 2021-10-08 PROCEDURE — 84466 ASSAY OF TRANSFERRIN: CPT

## 2021-10-08 PROCEDURE — 84156 ASSAY OF PROTEIN URINE: CPT

## 2021-10-08 PROCEDURE — 86850 RBC ANTIBODY SCREEN: CPT

## 2021-10-08 PROCEDURE — 85610 PROTHROMBIN TIME: CPT

## 2021-10-08 PROCEDURE — 85384 FIBRINOGEN ACTIVITY: CPT

## 2021-10-08 PROCEDURE — G0463: CPT

## 2021-10-08 PROCEDURE — 86769 SARS-COV-2 COVID-19 ANTIBODY: CPT

## 2021-10-08 PROCEDURE — 85025 COMPLETE CBC W/AUTO DIFF WBC: CPT

## 2021-10-08 PROCEDURE — 84550 ASSAY OF BLOOD/URIC ACID: CPT

## 2021-10-08 PROCEDURE — 83550 IRON BINDING TEST: CPT

## 2021-10-08 PROCEDURE — 87653 STREP B DNA AMP PROBE: CPT

## 2021-10-08 PROCEDURE — 83020 HEMOGLOBIN ELECTROPHORESIS: CPT

## 2021-10-08 PROCEDURE — 85730 THROMBOPLASTIN TIME PARTIAL: CPT

## 2021-10-08 PROCEDURE — 99238 HOSP IP/OBS DSCHRG MGMT 30/<: CPT

## 2021-10-08 PROCEDURE — 83540 ASSAY OF IRON: CPT

## 2021-10-08 PROCEDURE — 83036 HEMOGLOBIN GLYCOSYLATED A1C: CPT

## 2021-10-08 PROCEDURE — 99231 SBSQ HOSP IP/OBS SF/LOW 25: CPT | Mod: 25

## 2021-10-08 PROCEDURE — 86901 BLOOD TYPING SEROLOGIC RH(D): CPT

## 2021-10-08 PROCEDURE — 82962 GLUCOSE BLOOD TEST: CPT

## 2021-10-08 PROCEDURE — 87086 URINE CULTURE/COLONY COUNT: CPT

## 2021-10-08 PROCEDURE — 85027 COMPLETE CBC AUTOMATED: CPT

## 2021-10-08 PROCEDURE — 82570 ASSAY OF URINE CREATININE: CPT

## 2021-10-08 PROCEDURE — 86900 BLOOD TYPING SEROLOGIC ABO: CPT

## 2021-10-08 PROCEDURE — 82728 ASSAY OF FERRITIN: CPT

## 2021-10-08 PROCEDURE — 81001 URINALYSIS AUTO W/SCOPE: CPT

## 2021-10-08 PROCEDURE — 80053 COMPREHEN METABOLIC PANEL: CPT

## 2021-10-08 PROCEDURE — 99232 SBSQ HOSP IP/OBS MODERATE 35: CPT

## 2021-10-08 PROCEDURE — 76818 FETAL BIOPHYS PROFILE W/NST: CPT | Mod: 26

## 2021-10-08 PROCEDURE — 59025 FETAL NON-STRESS TEST: CPT

## 2021-10-08 RX ORDER — CEPHALEXIN 500 MG
500 CAPSULE ORAL
Refills: 0 | Status: DISCONTINUED | OUTPATIENT
Start: 2021-10-08 | End: 2021-10-08

## 2021-10-08 RX ORDER — CEPHALEXIN 500 MG
1 CAPSULE ORAL
Qty: 0 | Refills: 0 | DISCHARGE
Start: 2021-10-08

## 2021-10-08 RX ORDER — CEPHALEXIN 500 MG/1
500 CAPSULE ORAL 4 TIMES DAILY
Qty: 28 | Refills: 0 | Status: ACTIVE | COMMUNITY
Start: 2021-05-05 | End: 1900-01-01

## 2021-10-08 RX ORDER — ASPIRIN/CALCIUM CARB/MAGNESIUM 324 MG
1 TABLET ORAL
Qty: 0 | Refills: 0 | DISCHARGE
Start: 2021-10-08

## 2021-10-08 RX ADMIN — HEPARIN SODIUM 5000 UNIT(S): 5000 INJECTION INTRAVENOUS; SUBCUTANEOUS at 05:43

## 2021-10-08 RX ADMIN — Medication 81 MILLIGRAM(S): at 11:34

## 2021-10-08 RX ADMIN — Medication 500 MILLIGRAM(S): at 12:35

## 2021-10-08 NOTE — PROGRESS NOTE ADULT - PROBLEM SELECTOR PROBLEM 1
Type 1 diabetes mellitus without complication, with long term current use of insulin pump

## 2021-10-08 NOTE — DISCHARGE NOTE ANTEPARTUM - HOSPITAL COURSE
20yo  presented from the office @34w3d for oligohydramnios (CARMEN 3.64) in setting of poorly controlled T1DM. HD#1-3, insulin pump basal settings were titrated w/Endocrinology. She was seen by the Nutritionist and Social Work.     ks w/ T1DM presenting today from the [facility] office for low CARMEN (3.64) on [facility] ultrasound today, BPP 8/10 (-2 for breathing), & poor blood glucose control. Pt c/o mild cramping, denies LOF, VB. Endorses good fetal movement. Pt denies other concerns at this time. 20yo  presented from the office @34w3d for oligohydramnios (CARMEN 3.64) in setting of poorly controlled T1DM. HD#1-3, insulin pump basal settings were titrated w/Endocrinology. She was seen by the Nutritionist and Social Work. She received 1x Venofer 300mg IVPB. She was started on Keflex 500 QID prior to discharge for UTI (Staph aureus, GBS). She was discharged on HD #4 with close outpatient follow-up.

## 2021-10-08 NOTE — DISCHARGE NOTE ANTEPARTUM - CARE PLAN
1 Principal Discharge DX:	Diabetes mellitus in pregnancy  Assessment and plan of treatment:	Follow up in office on 10/12  Follow up with endocrinology

## 2021-10-08 NOTE — PROGRESS NOTE ADULT - ASSESSMENT
20yo HD#4  @ 34w6d w/T1DM a/w oligohydramnios, poor T1DM compliance. Maternal and fetal status reassuring overnight.     #T1DM  - c/w Novolog pump  - c/w ASA  - FS, ISS  - CC reg diet w/evening snack  - P/C (10/6): 0.2  - f/u UCx (10/6)  - Appreciate Endo recs  - f/u Nutrition recs    #Oligohydramnios -> sono 10/6 CARMEN 10   - 2/2 poor T1DM control  - PPROM r/o 10/5    #Fetal wellbeing  - Late   BMZ not suggested 2/2 risks of poor glycemic control  - f/u GBS 10/5  - NST BID, BPP x2    #Maternal wellbeing  - No pain  - VTE ppx: HSQ, SCDs, OOB  - f/u SW consult    Myriam Martin R3 20yo HD#4  @ 34w6d w/T1DM a/w oligohydramnios, poor T1DM compliance. Maternal and fetal status reassuring overnight.     #T1DM  - c/w Novolog pump  - c/w ASA  - FS, ISS  - CC reg diet w/evening snack  - P/C (10/6): 0.2  - f/u UCx (10/6)  - Appreciate Endo recs  - Appreciate Nutrition recs    #Oligohydramnios -> sono 10/6 CARMEN 10   - 2/2 poor T1DM control  - PPROM r/o 10/5    #Fetal wellbeing  - Late   BMZ not suggested 2/2 risks of poor glycemic control  - GBS 10/5: positive  - NST BID, BPP x2    #Maternal wellbeing  - No pain  - VTE ppx: HSQ, SCDs, OOB  - f/u SW consult    Myriam Martin R3

## 2021-10-08 NOTE — PROGRESS NOTE ADULT - SUBJECTIVE AND OBJECTIVE BOX
PPD#2- ATTENDING NOTE  FRANCY MIMS  [id number]  22y/o Patient is a 21y old  at 34 wks 4days, type 1 DM, with poorly controlled glucose levels.   +FM, no VB,no LOF, no contractions.  patient followed by MFM and endocrine.     O: Vital Signs Last 24 Hrs  T(C): 36.9 (07 Oct 2021 09:29), Max: 37 (07 Oct 2021 05:24)  T(F): 98.5 (07 Oct 2021 09:29), Max: 98.6 (07 Oct 2021 05:24)  HR: 109 (07 Oct 2021 09:29) (96 - 109)  BP: 112/74 (07 Oct 2021 09:29) (100/62 - 113/79)  BP(mean): --  RR: 18 (07 Oct 2021 09:29) (18 - 18)  SpO2: 97% (07 Oct 2021 09:29) (97% - 99%)    Gen: NAD  Abd: soft, NT, ND, gravid  Ext: no tenderness B/L     Labs:                        8.4    6.39  )-----------( 286      ( 06 Oct 2021 06:56 )             25.9     A/P: 22y/o Patient is a 21y old  at 34 wks 4days, type 1 DM, with poorly controlled glucose levels.   Patient followed by MFM and endocrine.   Possible discharge home tomorrow if glucose levels well controlled and if cleared  by MFM and endocrine.   Insulin pump  NST  BPP tomorrow   Continue to natalie Márquez MD  Office Number [phone]
MFM Attending    I saw and evaluated Ms. Sanchez on 3 KWN. She is asymptomatic. BPP 10/10 this morning with a reactive NST. Amniotic fluid volume is normal.   Fingersticks are improved. Outpatient management would be preferable as control is best achieved during the patient's habitual diet and activity. She will be followed by medical endocrinology. 
MFM Fellow    Patient seen at bedside with MFM attending Dr. Rodriguez.  She reports feeling shaky this morning when her glucose level was low. She denies any symptoms overnight.  She denies abdominal pain c/w ctx, LOF, VB and reports normal fetal movements.    Vital Signs Last 24 Hrs  T(C): 36.9 (07 Oct 2021 09:29), Max: 37 (07 Oct 2021 05:24)  T(F): 98.5 (07 Oct 2021 09:29), Max: 98.6 (07 Oct 2021 05:24)  HR: 109 (07 Oct 2021 09:29) (96 - 109)  BP: 112/74 (07 Oct 2021 09:29) (100/62 - 113/79)  BP(mean): --  RR: 18 (07 Oct 2021 09:29) (18 - 18)  SpO2: 97% (07 Oct 2021 09:29) (97% - 99%)  GA: NAD, A+Ox3  EFM: NST pending                        8.4    6.39  )-----------( 286      ( 06 Oct 2021 06:56 )             25.9     10-06    138  |  106  |  12  ----------------------------<  58<L>  3.7   |  19<L>  |  0.56    Ca    9.2      06 Oct 2021 06:51    TPro  6.1  /  Alb  3.1<L>  /  TBili  0.3  /  DBili  x   /  AST  45<H>  /  ALT  59<H>  /  AlkPhos  253<H>  10-06    PT/INR - ( 06 Oct 2021 06:56 )   PT: 11.3 sec;   INR: 0.94 ratio       PTT - ( 06 Oct 2021 06:56 )  PTT:24.1 sec    CAPILLARY BLOOD GLUCOSE    POCT Blood Glucose.: 149 mg/dL (07 Oct 2021 09:46)  POCT Blood Glucose.: 97 mg/dL (07 Oct 2021 08:25)  POCT Blood Glucose.: 91 mg/dL (07 Oct 2021 07:37)  POCT Blood Glucose.: 71 mg/dL (07 Oct 2021 07:03)  POCT Blood Glucose.: 60 mg/dL (07 Oct 2021 06:47)  POCT Blood Glucose.: 53 mg/dL (07 Oct 2021 06:27)  POCT Blood Glucose.: 53 mg/dL (07 Oct 2021 06:25)  POCT Blood Glucose.: 145 mg/dL (06 Oct 2021 21:55)  POCT Blood Glucose.: 120 mg/dL (06 Oct 2021 19:35)  POCT Blood Glucose.: 122 mg/dL (06 Oct 2021 14:28)  POCT Blood Glucose.: 79 mg/dL (06 Oct 2021 13:51)  POCT Blood Glucose.: 57 mg/dL (06 Oct 2021 13:32)  POCT Blood Glucose.: 56 mg/dL (06 Oct 2021 13:28)  POCT Blood Glucose.: 147 mg/dL (06 Oct 2021 11:04)      MEDICATIONS  (STANDING):  aspirin  chewable 81 milliGRAM(s) Oral daily  dextrose 40% Gel 15 Gram(s) Oral once  dextrose 5%. 1000 milliLiter(s) (50 mL/Hr) IV Continuous <Continuous>  dextrose 5%. 1000 milliLiter(s) (100 mL/Hr) IV Continuous <Continuous>  dextrose 50% Injectable 25 Gram(s) IV Push once  dextrose 50% Injectable 12.5 Gram(s) IV Push once  dextrose 50% Injectable 25 Gram(s) IV Push once  glucagon  Injectable 1 milliGRAM(s) IntraMuscular once  heparin   Injectable 5000 Unit(s) SubCutaneous every 12 hours  influenza   Vaccine 0.5 milliLiter(s) IntraMuscular once  insulin aspart (NovoLOG) Pump 1 Each SubCutaneous Continuous Pump    MEDICATIONS  (PRN):  
R3 OB ANTEPARTUM NOTE    Patient seen and examined at bedside, no acute overnight events. No acute complaints. Hypoglycemia w/o sx to 56.    +FM. Denies LOF, VB, CTX. Denies HA, CP, SOB, N/V, fevers/chills.  Tolerating regular diet. Voiding freely. Ambulating without difficulty.     Vital Signs Last 24 Hours  T(C): 36.6 (10-06-21 @ 05:10), Max: 36.9 (10-05-21 @ 21:07)  HR: 90 (10-06-21 @ 05:10) (90 - 134)  BP: 112/67 (10-06-21 @ 05:10) (92/60 - 127/72)  RR: 18 (10-06-21 @ 05:10) (16 - 20)  SpO2: 98% (10-06-21 @ 05:10) (93% - 100%)    CAPILLARY BLOOD GLUCOSE  POCT Blood Glucose.: 79 mg/dL (06 Oct 2021 06:32)  POCT Blood Glucose.: 67 mg/dL (06 Oct 2021 05:45)  POCT Blood Glucose.: 60 mg/dL (06 Oct 2021 05:22)  POCT Blood Glucose.: 56 mg/dL (06 Oct 2021 05:21)  POCT Blood Glucose.: 103 mg/dL (05 Oct 2021 22:25)  POCT Blood Glucose.: 147 mg/dL (05 Oct 2021 21:15)  POCT Blood Glucose.: 116 mg/dL (05 Oct 2021 19:56)  POCT Blood Glucose.: 151 mg/dL (05 Oct 2021 17:47)  POCT Blood Glucose.: 69 mg/dL (05 Oct 2021 15:40)  POCT Blood Glucose.: 70 mg/dL (05 Oct 2021 12:52)      Physical Exam:  General: NAD  Chest: nonlabored breathing  Abdomen: Soft, non-tender, gravid  Ext: No pain or swelling    Labs:             9.3    6.60  )-----------( 331      ( 10-05 @ 15:55 )             29.3     10-05 @ 15:55    138  |  104  |  8   ----------------------------<  63  4.5   |  18  |  0.56    Ca    9.1      10-05 @ 15:55    TPro  6.9  /  Alb  3.3  /  TBili  0.4  /  DBili  x   /  AST  53  /  ALT  63  /  AlkPhos  287  10-05 @ 15:55      PTT - ( 10-05 @ 15:55 )  PTT:24.3 sec    Uric Acid: (10-05 @ 15:55)  3.7      Fibrinogen: (10-05 @ 15:55)  896      LDH: (10-05 @ 15:55)  --         MEDICATIONS  (STANDING):  aspirin  chewable 81 milliGRAM(s) Oral daily  dextrose 40% Gel 15 Gram(s) Oral once  dextrose 5%. 1000 milliLiter(s) (50 mL/Hr) IV Continuous <Continuous>  dextrose 5%. 1000 milliLiter(s) (100 mL/Hr) IV Continuous <Continuous>  dextrose 50% Injectable 25 Gram(s) IV Push once  dextrose 50% Injectable 12.5 Gram(s) IV Push once  dextrose 50% Injectable 25 Gram(s) IV Push once  glucagon  Injectable 1 milliGRAM(s) IntraMuscular once  heparin   Injectable 5000 Unit(s) SubCutaneous every 12 hours  influenza   Vaccine 0.5 milliLiter(s) IntraMuscular once  insulin aspart (NovoLOG) Pump 1 Each SubCutaneous Continuous Pump    MEDICATIONS  (PRN):      
MFM Fellow    Patient seen at bedside w/ MFM attending Dr. Rodriguez.  She reports feeling well, denies abdominal pain, ctx, LOF, VB and reports normal fetal movements.  She states she had a tuna salad sandwich for dinner and peanut butter with crackers for bedtime snack.    Vital Signs Last 24 Hrs  T(C): 36.6 (06 Oct 2021 05:10), Max: 36.9 (05 Oct 2021 21:07)  T(F): 97.8 (06 Oct 2021 05:10), Max: 98.5 (06 Oct 2021 00:15)  HR: 90 (06 Oct 2021 05:10) (90 - 134)  BP: 112/67 (06 Oct 2021 05:10) (92/60 - 127/72)  BP(mean): --  RR: 18 (06 Oct 2021 05:10) (16 - 20)  SpO2: 98% (06 Oct 2021 05:10) (93% - 100%)  GA: NAD, A+Ox3  EFM: NST pending                          8.4    6.39  )-----------( 286      ( 06 Oct 2021 06:56 )             25.9   10-06    138  |  106  |  12  ----------------------------<  58<L>  3.7   |  19<L>  |  0.56    Ca    9.2      06 Oct 2021 06:51    TPro  6.1  /  Alb  3.1<L>  /  TBili  0.3  /  DBili  x   /  AST  45<H>  /  ALT  59<H>  /  AlkPhos  253<H>  10-06  PT/INR - ( 06 Oct 2021 06:56 )   PT: 11.3 sec;   INR: 0.94 ratio         PTT - ( 06 Oct 2021 06:56 )  PTT:24.1 sec    MEDICATIONS  (STANDING):  aspirin  chewable 81 milliGRAM(s) Oral daily  dextrose 40% Gel 15 Gram(s) Oral once  dextrose 5%. 1000 milliLiter(s) (50 mL/Hr) IV Continuous <Continuous>  dextrose 5%. 1000 milliLiter(s) (100 mL/Hr) IV Continuous <Continuous>  dextrose 50% Injectable 25 Gram(s) IV Push once  dextrose 50% Injectable 12.5 Gram(s) IV Push once  dextrose 50% Injectable 25 Gram(s) IV Push once  glucagon  Injectable 1 milliGRAM(s) IntraMuscular once  heparin   Injectable 5000 Unit(s) SubCutaneous every 12 hours  influenza   Vaccine 0.5 milliLiter(s) IntraMuscular once  insulin aspart (NovoLOG) Pump 1 Each SubCutaneous Continuous Pump    MEDICATIONS  (PRN):  
R3 OB ANTEPARTUM NOTE    Patient seen and examined at bedside, no acute overnight events. No acute complaints.   +FM. Denies LOF, VB, CTX. Denies HA, CP, SOB, N/V, fevers/chills.  Tolerating regular diet. Voiding freely. Ambulating without difficulty.     Vital Signs Last 24 Hours  T(C): 36.6 (10-08-21 @ 05:45), Max: 36.9 (10-07-21 @ 09:29)  HR: 97 (10-08-21 @ 05:45) (93 - 109)  BP: 98/63 (10-08-21 @ 05:45) (98/61 - 125/86)  RR: 18 (10-08-21 @ 05:45) (18 - 18)  SpO2: 99% (10-08-21 @ 05:45) (97% - 100%)    CAPILLARY BLOOD GLUCOSE  POCT Blood Glucose.: 101 mg/dL (08 Oct 2021 05:49)  POCT Blood Glucose.: 81 mg/dL (08 Oct 2021 01:56)  POCT Blood Glucose.: 143 mg/dL (07 Oct 2021 22:26)  POCT Blood Glucose.: 185 mg/dL (07 Oct 2021 20:09)  POCT Blood Glucose.: 109 mg/dL (07 Oct 2021 18:28)  POCT Blood Glucose.: 112 mg/dL (07 Oct 2021 16:09)  POCT Blood Glucose.: 148 mg/dL (07 Oct 2021 15:06)  POCT Blood Glucose.: 149 mg/dL (07 Oct 2021 09:46)  POCT Blood Glucose.: 97 mg/dL (07 Oct 2021 08:25)  POCT Blood Glucose.: 91 mg/dL (07 Oct 2021 07:37)  POCT Blood Glucose.: 71 mg/dL (07 Oct 2021 07:03)  POCT Blood Glucose.: 60 mg/dL (07 Oct 2021 06:47)      Physical Exam:  General: NAD  Chest: nonlabored breathing  Abdomen: Soft, non-tender, gravid  Ext: No pain or swelling      Labs:             8.4    6.39  )-----------( 286      ( 10-06 @ 06:56 )             25.9     10-06 @ 06:51    138  |  106  |  12  ----------------------------<  58  3.7   |  19  |  0.56    Ca    9.2      10-06 @ 06:51    TPro  6.1  /  Alb  3.1  /  TBili  0.3  /  DBili  x   /  AST  45  /  ALT  59  /  AlkPhos  253  10-06 @ 06:51    PT/INR - ( 10-06 @ 06:56 )   PT: 11.3 sec;   INR: 0.94 ratio    PTT - ( 10-06 @ 06:56 )  PTT:24.1 sec    Uric Acid: (10-06 @ 06:56)  --       Fibrinogen: (10-06 @ 06:56)  688      LDH: (10-06 @ 06:56)  --         MEDICATIONS  (STANDING):  aspirin  chewable 81 milliGRAM(s) Oral daily  dextrose 40% Gel 15 Gram(s) Oral once  dextrose 5%. 1000 milliLiter(s) (50 mL/Hr) IV Continuous <Continuous>  dextrose 5%. 1000 milliLiter(s) (100 mL/Hr) IV Continuous <Continuous>  dextrose 50% Injectable 25 Gram(s) IV Push once  dextrose 50% Injectable 12.5 Gram(s) IV Push once  dextrose 50% Injectable 25 Gram(s) IV Push once  glucagon  Injectable 1 milliGRAM(s) IntraMuscular once  heparin   Injectable 5000 Unit(s) SubCutaneous every 12 hours  influenza   Vaccine 0.5 milliLiter(s) IntraMuscular once  insulin aspart (NovoLOG) Pump 1 Each SubCutaneous Continuous Pump    MEDICATIONS  (PRN):      
R3 OB ANTEPARTUM NOTE    Patient seen and examined at bedside, no acute overnight events. No acute complaints.   +FM. Denies LOF, VB, CTX. Denies HA, CP, SOB, N/V, fevers/chills.  Tolerating regular diet. Voiding freely. Ambulating without difficulty.     Vital Signs Last 24 Hours  T(C): 37 (10-07-21 @ 05:24), Max: 37 (10-07-21 @ 05:24)  HR: 100 (10-07-21 @ 05:24) (96 - 119)  BP: 100/62 (10-07-21 @ 05:24) (100/62 - 113/79)  RR: 18 (10-07-21 @ 05:24) (18 - 18)  SpO2: 98% (10-07-21 @ 05:24) (97% - 99%)    CAPILLARY BLOOD GLUCOSE  POCT Blood Glucose.: 145 mg/dL (06 Oct 2021 21:55)  POCT Blood Glucose.: 120 mg/dL (06 Oct 2021 19:35)  POCT Blood Glucose.: 122 mg/dL (06 Oct 2021 14:28)  POCT Blood Glucose.: 79 mg/dL (06 Oct 2021 13:51)  POCT Blood Glucose.: 57 mg/dL (06 Oct 2021 13:32)  POCT Blood Glucose.: 56 mg/dL (06 Oct 2021 13:28)  POCT Blood Glucose.: 147 mg/dL (06 Oct 2021 11:04)  POCT Blood Glucose.: 151 mg/dL (06 Oct 2021 10:04)  POCT Blood Glucose.: 91 mg/dL (06 Oct 2021 06:57)  POCT Blood Glucose.: 79 mg/dL (06 Oct 2021 06:32)      Physical Exam:  General: NAD  Chest: nonlabored breathing  Abdomen: Soft, non-tender, gravid  Ext: No pain or swelling      Labs:             8.4    6.39  )-----------( 286      ( 10-06 @ 06:56 )             25.9     10-06 @ 06:51    138  |  106  |  12  ----------------------------<  58  3.7   |  19  |  0.56    Ca    9.2      10-06 @ 06:51    TPro  6.1  /  Alb  3.1  /  TBili  0.3  /  DBili  x   /  AST  45  /  ALT  59  /  AlkPhos  253  10-06 @ 06:51    PT/INR - ( 10-06 @ 06:56 )   PT: 11.3 sec;   INR: 0.94 ratio    PTT - ( 10-06 @ 06:56 )  PTT:24.1 sec    Uric Acid: (10-06 @ 06:56)  --       Fibrinogen: (10-06 @ 06:56)  688      LDH: (10-06 @ 06:56)  --         MEDICATIONS  (STANDING):  aspirin  chewable 81 milliGRAM(s) Oral daily  dextrose 40% Gel 15 Gram(s) Oral once  dextrose 5%. 1000 milliLiter(s) (50 mL/Hr) IV Continuous <Continuous>  dextrose 5%. 1000 milliLiter(s) (100 mL/Hr) IV Continuous <Continuous>  dextrose 50% Injectable 25 Gram(s) IV Push once  dextrose 50% Injectable 12.5 Gram(s) IV Push once  dextrose 50% Injectable 25 Gram(s) IV Push once  glucagon  Injectable 1 milliGRAM(s) IntraMuscular once  heparin   Injectable 5000 Unit(s) SubCutaneous every 12 hours  influenza   Vaccine 0.5 milliLiter(s) IntraMuscular once  insulin aspart (NovoLOG) Pump 1 Each SubCutaneous Continuous Pump    MEDICATIONS  (PRN):      
    Chief Complaint: T1DM    History: No acute events. No hypoglycemia. Patient is to be discharged home today.     MEDICATIONS  (STANDING):  aspirin  chewable 81 milliGRAM(s) Oral daily  cephalexin 500 milliGRAM(s) Oral four times a day  dextrose 40% Gel 15 Gram(s) Oral once  dextrose 5%. 1000 milliLiter(s) (50 mL/Hr) IV Continuous <Continuous>  dextrose 5%. 1000 milliLiter(s) (100 mL/Hr) IV Continuous <Continuous>  dextrose 50% Injectable 25 Gram(s) IV Push once  dextrose 50% Injectable 12.5 Gram(s) IV Push once  dextrose 50% Injectable 25 Gram(s) IV Push once  glucagon  Injectable 1 milliGRAM(s) IntraMuscular once  heparin   Injectable 5000 Unit(s) SubCutaneous every 12 hours  insulin aspart (NovoLOG) Pump 1 Each SubCutaneous Continuous Pump    MEDICATIONS  (PRN):      Allergies    Cipro (Vomiting)    Intolerances      Review of Systems:  Constitutional: No fever  Eyes: No blurry vision  Neuro: No tremors  HEENT: No pain  Cardiovascular: No chest pain, palpitations  Respiratory: No SOB, no cough  GI: No nausea, vomiting, abdominal pain  : No dysuria  Skin: no rash  Psych: no depression  Endocrine: no polyuria, polydipsia  Hem/lymph: no swelling  Osteoporosis: no fractures    ALL OTHER SYSTEMS REVIEWED AND NEGATIVE        PHYSICAL EXAM:  VITALS: T(C): 36.7 (10-08-21 @ 08:54)  T(F): 98 (10-08-21 @ 08:54), Max: 98.3 (10-07-21 @ 16:30)  HR: 102 (10-08-21 @ 08:54) (93 - 105)  BP: 109/71 (10-08-21 @ 08:54) (98/61 - 125/86)  RR:  (18 - 18)  SpO2:  (98% - 100%)  Wt(kg): --  GENERAL: NAD, well-groomed, well-developed  EYES: No proptosis, no lid lag, anicteric  HEENT:  Atraumatic, Normocephalic, moist mucous membranes  RESPIRATORY: Clear to auscultation bilaterally  CARDIOVASCULAR: Regular rate and rhythm  GI: Soft, nontender, non distended, normal bowel sounds  SKIN: Dry, intact, No rashes or lesions  MUSCULOSKELETAL: Full range of motion, normal strength  NEURO: sensation intact, extraocular movements intact, no tremor, normal reflexes  PSYCH: Alert and oriented x 3, normal affect, normal mood      POCT Blood Glucose.: 146 mg/dL (10-08-21 @ 10:13)  POCT Blood Glucose.: 105 mg/dL (10-08-21 @ 08:54)  POCT Blood Glucose.: 101 mg/dL (10-08-21 @ 05:49)  POCT Blood Glucose.: 81 mg/dL (10-08-21 @ 01:56)  POCT Blood Glucose.: 143 mg/dL (10-07-21 @ 22:26)  POCT Blood Glucose.: 185 mg/dL (10-07-21 @ 20:09)  POCT Blood Glucose.: 109 mg/dL (10-07-21 @ 18:28)  POCT Blood Glucose.: 112 mg/dL (10-07-21 @ 16:09)  POCT Blood Glucose.: 148 mg/dL (10-07-21 @ 15:06)  POCT Blood Glucose.: 149 mg/dL (10-07-21 @ 09:46)  POCT Blood Glucose.: 97 mg/dL (10-07-21 @ 08:25)  POCT Blood Glucose.: 91 mg/dL (10-07-21 @ 07:37)  POCT Blood Glucose.: 71 mg/dL (10-07-21 @ 07:03)  POCT Blood Glucose.: 60 mg/dL (10-07-21 @ 06:47)  POCT Blood Glucose.: 53 mg/dL (10-07-21 @ 06:27)  POCT Blood Glucose.: 53 mg/dL (10-07-21 @ 06:25)  POCT Blood Glucose.: 145 mg/dL (10-06-21 @ 21:55)  POCT Blood Glucose.: 120 mg/dL (10-06-21 @ 19:35)  POCT Blood Glucose.: 122 mg/dL (10-06-21 @ 14:28)  POCT Blood Glucose.: 79 mg/dL (10-06-21 @ 13:51)  POCT Blood Glucose.: 57 mg/dL (10-06-21 @ 13:32)  POCT Blood Glucose.: 56 mg/dL (10-06-21 @ 13:28)  POCT Blood Glucose.: 147 mg/dL (10-06-21 @ 11:04)  POCT Blood Glucose.: 151 mg/dL (10-06-21 @ 10:04)  POCT Blood Glucose.: 91 mg/dL (10-06-21 @ 06:57)  POCT Blood Glucose.: 79 mg/dL (10-06-21 @ 06:32)  POCT Blood Glucose.: 67 mg/dL (10-06-21 @ 05:45)  POCT Blood Glucose.: 60 mg/dL (10-06-21 @ 05:22)  POCT Blood Glucose.: 56 mg/dL (10-06-21 @ 05:21)  POCT Blood Glucose.: 103 mg/dL (10-05-21 @ 22:25)  POCT Blood Glucose.: 147 mg/dL (10-05-21 @ 21:15)  POCT Blood Glucose.: 116 mg/dL (10-05-21 @ 19:56)  POCT Blood Glucose.: 151 mg/dL (10-05-21 @ 17:47)  POCT Blood Glucose.: 69 mg/dL (10-05-21 @ 15:40)  POCT Blood Glucose.: 53 mg/dL (10-05-21 @ 15:10)  POCT Blood Glucose.: 70 mg/dL (10-05-21 @ 12:52)      10-06    138  |  106  |  12  ----------------------------<  58<L>  3.7   |  19<L>  |  0.56    EGFR if : 156  EGFR if non : 134    Ca    9.2      10-06    TPro  6.1  /  Alb  3.1<L>  /  TBili  0.3  /  DBili  x   /  AST  45<H>  /  ALT  59<H>  /  AlkPhos  253<H>  10-06                              
    Chief Complaint: Hypoglycemia    History: Eating well. Was hypoglycemic again this morning but not last night or throughout the rest of the day today.    MEDICATIONS  (STANDING):  aspirin  chewable 81 milliGRAM(s) Oral daily  dextrose 40% Gel 15 Gram(s) Oral once  dextrose 5%. 1000 milliLiter(s) (50 mL/Hr) IV Continuous <Continuous>  dextrose 5%. 1000 milliLiter(s) (100 mL/Hr) IV Continuous <Continuous>  dextrose 50% Injectable 25 Gram(s) IV Push once  dextrose 50% Injectable 12.5 Gram(s) IV Push once  dextrose 50% Injectable 25 Gram(s) IV Push once  glucagon  Injectable 1 milliGRAM(s) IntraMuscular once  heparin   Injectable 5000 Unit(s) SubCutaneous every 12 hours  influenza   Vaccine 0.5 milliLiter(s) IntraMuscular once  insulin aspart (NovoLOG) Pump 1 Each SubCutaneous Continuous Pump    MEDICATIONS  (PRN):    PHYSICAL EXAM:  VITALS: T(C): 36.8 (10-07-21 @ 16:30)  T(F): 98.3 (10-07-21 @ 16:30), Max: 98.6 (10-07-21 @ 05:24)  HR: 95 (10-07-21 @ 16:30) (95 - 109)  BP: 110/70 (10-07-21 @ 16:30) (98/61 - 113/79)  RR:  (18 - 18)  SpO2:  (97% - 99%)  Wt(kg): --  General: Well-developed female, No acute distress, Speaking full sentences.   Eye:  Extraocular movements are intact, No proptosis or lid lag, No scleral icterus.   Respiratory:  Respirations are non-labored, Symmetric chest wall expansion, Breath sounds are equal.  Cardiovascular:  Normal rate, Regular rhythm, No edema.  Gastrointestinal:  Gravid, Non-tender.   Integumentary:  Warm, dry.    POCT Blood Glucose.: 112 mg/dL (10-07-21 @ 16:09)  POCT Blood Glucose.: 148 mg/dL (10-07-21 @ 15:06)  POCT Blood Glucose.: 149 mg/dL (10-07-21 @ 09:46)  POCT Blood Glucose.: 97 mg/dL (10-07-21 @ 08:25)  POCT Blood Glucose.: 91 mg/dL (10-07-21 @ 07:37)  POCT Blood Glucose.: 71 mg/dL (10-07-21 @ 07:03)  POCT Blood Glucose.: 60 mg/dL (10-07-21 @ 06:47)  POCT Blood Glucose.: 53 mg/dL (10-07-21 @ 06:27)  POCT Blood Glucose.: 53 mg/dL (10-07-21 @ 06:25)  POCT Blood Glucose.: 145 mg/dL (10-06-21 @ 21:55)  POCT Blood Glucose.: 120 mg/dL (10-06-21 @ 19:35)  POCT Blood Glucose.: 122 mg/dL (10-06-21 @ 14:28)  POCT Blood Glucose.: 79 mg/dL (10-06-21 @ 13:51)  POCT Blood Glucose.: 57 mg/dL (10-06-21 @ 13:32)  POCT Blood Glucose.: 56 mg/dL (10-06-21 @ 13:28)  POCT Blood Glucose.: 147 mg/dL (10-06-21 @ 11:04)  POCT Blood Glucose.: 151 mg/dL (10-06-21 @ 10:04)  POCT Blood Glucose.: 91 mg/dL (10-06-21 @ 06:57)  POCT Blood Glucose.: 79 mg/dL (10-06-21 @ 06:32)  POCT Blood Glucose.: 67 mg/dL (10-06-21 @ 05:45)  POCT Blood Glucose.: 60 mg/dL (10-06-21 @ 05:22)  POCT Blood Glucose.: 56 mg/dL (10-06-21 @ 05:21)  POCT Blood Glucose.: 103 mg/dL (10-05-21 @ 22:25)  POCT Blood Glucose.: 147 mg/dL (10-05-21 @ 21:15)  POCT Blood Glucose.: 116 mg/dL (10-05-21 @ 19:56)  POCT Blood Glucose.: 151 mg/dL (10-05-21 @ 17:47)  POCT Blood Glucose.: 69 mg/dL (10-05-21 @ 15:40)  POCT Blood Glucose.: 53 mg/dL (10-05-21 @ 15:10)  POCT Blood Glucose.: 70 mg/dL (10-05-21 @ 12:52)      10-06    138  |  106  |  12  ----------------------------<  58<L>  3.7   |  19<L>  |  0.56    EGFR if : 156  EGFR if non : 134    Ca    9.2      10-06    TPro  6.1  /  Alb  3.1<L>  /  TBili  0.3  /  DBili  x   /  AST  45<H>  /  ALT  59<H>  /  AlkPhos  253<H>  10-06          Thyroid Function Tests:                    
    Chief Complaint: Hypoglycemia    History: Feeling well, eating well. Notes that she is eating healthier than at home, namely with fewer carbs and this is why she feels her BG is running a bit low on her home settings. Is motivated to continue eating well once she gets home.    MEDICATIONS  (STANDING):  aspirin  chewable 81 milliGRAM(s) Oral daily  dextrose 40% Gel 15 Gram(s) Oral once  dextrose 5%. 1000 milliLiter(s) (50 mL/Hr) IV Continuous <Continuous>  dextrose 5%. 1000 milliLiter(s) (100 mL/Hr) IV Continuous <Continuous>  dextrose 50% Injectable 25 Gram(s) IV Push once  dextrose 50% Injectable 12.5 Gram(s) IV Push once  dextrose 50% Injectable 25 Gram(s) IV Push once  glucagon  Injectable 1 milliGRAM(s) IntraMuscular once  heparin   Injectable 5000 Unit(s) SubCutaneous every 12 hours  influenza   Vaccine 0.5 milliLiter(s) IntraMuscular once  insulin aspart (NovoLOG) Pump 1 Each SubCutaneous Continuous Pump    MEDICATIONS  (PRN):    PHYSICAL EXAM:  VITALS: T(C): 36.7 (10-06-21 @ 13:13)  T(F): 98 (10-06-21 @ 13:13), Max: 98.5 (10-06-21 @ 00:15)  HR: 96 (10-06-21 @ 13:13) (90 - 124)  BP: 104/68 (10-06-21 @ 13:13) (92/60 - 127/72)  RR:  (16 - 18)  SpO2:  (97% - 99%)  Wt(kg): --  General: Well-developed female, No acute distress, Speaking full sentences.   Eye:  Extraocular movements are intact, No proptosis or lid lag, No scleral icterus.   Respiratory:  Respirations are non-labored, Symmetric chest wall expansion, Breath sounds are equal.  Cardiovascular:  Borderline tachycardic, Regular rhythm, No edema.  Gastrointestinal:  Gravid abd, Non-tender.   Integumentary:  Warm, dry.    POCT Blood Glucose.: 122 mg/dL (10-06-21 @ 14:28)  POCT Blood Glucose.: 79 mg/dL (10-06-21 @ 13:51)  POCT Blood Glucose.: 57 mg/dL (10-06-21 @ 13:32)  POCT Blood Glucose.: 56 mg/dL (10-06-21 @ 13:28)  POCT Blood Glucose.: 147 mg/dL (10-06-21 @ 11:04)  POCT Blood Glucose.: 151 mg/dL (10-06-21 @ 10:04)  POCT Blood Glucose.: 91 mg/dL (10-06-21 @ 06:57)  POCT Blood Glucose.: 79 mg/dL (10-06-21 @ 06:32)  POCT Blood Glucose.: 67 mg/dL (10-06-21 @ 05:45)  POCT Blood Glucose.: 60 mg/dL (10-06-21 @ 05:22)  POCT Blood Glucose.: 56 mg/dL (10-06-21 @ 05:21)  POCT Blood Glucose.: 103 mg/dL (10-05-21 @ 22:25)  POCT Blood Glucose.: 147 mg/dL (10-05-21 @ 21:15)  POCT Blood Glucose.: 116 mg/dL (10-05-21 @ 19:56)  POCT Blood Glucose.: 151 mg/dL (10-05-21 @ 17:47)  POCT Blood Glucose.: 69 mg/dL (10-05-21 @ 15:40)  POCT Blood Glucose.: 53 mg/dL (10-05-21 @ 15:10)  POCT Blood Glucose.: 70 mg/dL (10-05-21 @ 12:52)      10-06    138  |  106  |  12  ----------------------------<  58<L>  3.7   |  19<L>  |  0.56    EGFR if : 156  EGFR if non : 134    Ca    9.2      10-06    TPro  6.1  /  Alb  3.1<L>  /  TBili  0.3  /  DBili  x   /  AST  45<H>  /  ALT  59<H>  /  AlkPhos  253<H>  10-06          Thyroid Function Tests:

## 2021-10-08 NOTE — DISCHARGE NOTE ANTEPARTUM - PATIENT PORTAL LINK FT
You can access the FollowMyHealth Patient Portal offered by [facility] by registering at the following website: [email or web address]. By joining [facility]’s FollowMyHealth portal, you will also be able to view your health information using other applications (apps) compatible with our system.

## 2021-10-08 NOTE — DISCHARGE NOTE ANTEPARTUM - INSTRUCTIONS
Keep your follow up appointment with doctor as instructed, continue monitoring blood sugars at home and follow diabetic diet and notify doctor with any high or low blood sugars and call doctor with any questions or concerns.

## 2021-10-08 NOTE — DISCHARGE NOTE ANTEPARTUM - CARE PROVIDER_API CALL
April Isaac)  OBSGYN  General  [address], [address]  [place] [address]  Phone: [phone]  Fax: [phone]  Follow Up Time:

## 2021-10-08 NOTE — CHART NOTE - NSCHARTNOTEFT_GEN_A_CORE
R3 OB Chart Note    BPP performed w/Dr. Sparks MFM Fellow: VTX, anterior, CARMEN 10.5, 8/8    d/w Dr. Low Martin R3

## 2021-10-08 NOTE — PROGRESS NOTE ADULT - ASSESSMENT
Pt is a 20yo  @34.3wks w/ T1DM presenting today from the [facility] office for low CARMEN (3.64), BPP 8/10 (-2 for breathing), & poor blood glucose control. Wears an insulin pump. Endocrine consulted for T1DM management.    T1DM with history of poor control  -Fasting BG target 60-95  -One-hour postprandial BG goal 140 or less  -FSBG pre-meals, one-hour postprandial and at bedtime  -Decreased basal rates to 90% home rates (10/6). Further decreased her 3am basal rate (10/7). The pump is now programmed as follows:   Basal Rates: 12A- 2.1u/h, 3A- 1.9u/hr, 10AM- 2.1u/hr.  Insulin to carb ratio: 6  Correction factor: 55  BGT: 120  IOB: 4 hours  -Can use admelog in the pump for now, resume novolog at home  -Carb consistent diet  Discharge plan:  -Plan to discharge on insulin pump. Final rates pending clinical course.  -Recommend outpatient routine ophthalmology f/u and close f/u with [facility] and endocrinology Dr Lind.     HTN  -Outpatient goal BP <130/80. Management per primary team.    HLD  -Statin contraindicated in pregnancy      Kavita Covarrubias DO     Pt is a 22yo  @34.3wks w/ T1DM presenting today from the [facility] office for low CARMEN (3.64), BPP 8/10 (-2 for breathing), & poor blood glucose control. Wears an insulin pump. Endocrine consulted for T1DM management.    T1DM with history of poor control  -Fasting BG target 60-95  -One-hour postprandial BG goal 140 or less  -FSBG pre-meals, one-hour postprandial and at bedtime  -Decreased basal rates to 90% home rates (10/6). Further decreased her 3am basal rate (10/7). The pump is now programmed as follows:   Basal Rates: 12A- 2.1u/h, 3A- 1.9u/hr, 10AM- 2.1u/hr.  Insulin to carb ratio: 6  Correction factor: 55  BGT: 120  IOB: 4 hours  -Can use admelog in the pump for now, resume novolog at home  -Carb consistent diet  Discharge plan:  -Plan to discharge on insulin pump at above rates.  -Recommend outpatient routine ophthalmology f/u and close f/u with [facility] and endocrinology Dr Lind.     HTN  -Outpatient goal BP <130/80. Management per primary team.    HLD  -Statin contraindicated in pregnancy      Kavita Covarrubias DO

## 2021-10-08 NOTE — DISCHARGE NOTE ANTEPARTUM - MEDICATION SUMMARY - MEDICATIONS TO TAKE
I will START or STAY ON the medications listed below when I get home from the hospital:    omnipod insulin pump  -- Max basal rate set to 1.5 units.  -- Indication: For T1DM (type 1 diabetes mellitus)    aspirin 81 mg oral tablet, chewable  -- 1 tab(s) by mouth once a day  -- Indication: For T1DM (type 1 diabetes mellitus)    cephalexin 500 mg oral capsule  -- 1 cap(s) by mouth 4 times a day  -- Indication: For Urine infection

## 2021-10-09 LAB
-  AMPICILLIN/SULBACTAM: SIGNIFICANT CHANGE UP
-  CEFAZOLIN: SIGNIFICANT CHANGE UP
-  GENTAMICIN: SIGNIFICANT CHANGE UP
-  OXACILLIN: SIGNIFICANT CHANGE UP
-  PENICILLIN: SIGNIFICANT CHANGE UP
-  RIFAMPIN: SIGNIFICANT CHANGE UP
-  TETRACYCLINE: SIGNIFICANT CHANGE UP
-  TRIMETHOPRIM/SULFAMETHOXAZOLE: SIGNIFICANT CHANGE UP
-  VANCOMYCIN: SIGNIFICANT CHANGE UP
CULTURE RESULTS: SIGNIFICANT CHANGE UP
METHOD TYPE: SIGNIFICANT CHANGE UP
ORGANISM # SPEC MICROSCOPIC CNT: SIGNIFICANT CHANGE UP
ORGANISM # SPEC MICROSCOPIC CNT: SIGNIFICANT CHANGE UP
SPECIMEN SOURCE: SIGNIFICANT CHANGE UP

## 2021-10-12 ENCOUNTER — APPOINTMENT (OUTPATIENT)
Dept: ANTEPARTUM | Facility: CLINIC | Age: 21
End: 2021-10-12

## 2021-10-13 ENCOUNTER — NON-APPOINTMENT (OUTPATIENT)
Age: 21
End: 2021-10-13

## 2021-10-14 ENCOUNTER — APPOINTMENT (OUTPATIENT)
Dept: MATERNAL FETAL MEDICINE | Facility: CLINIC | Age: 21
End: 2021-10-14

## 2021-10-15 ENCOUNTER — APPOINTMENT (OUTPATIENT)
Dept: ANTEPARTUM | Facility: CLINIC | Age: 21
End: 2021-10-15
Payer: COMMERCIAL

## 2021-10-15 ENCOUNTER — ASOB RESULT (OUTPATIENT)
Age: 21
End: 2021-10-15

## 2021-10-15 ENCOUNTER — APPOINTMENT (OUTPATIENT)
Dept: OBGYN | Facility: CLINIC | Age: 21
End: 2021-10-15
Payer: COMMERCIAL

## 2021-10-15 ENCOUNTER — APPOINTMENT (OUTPATIENT)
Dept: ANTEPARTUM | Facility: CLINIC | Age: 21
End: 2021-10-15

## 2021-10-15 VITALS
SYSTOLIC BLOOD PRESSURE: 90 MMHG | WEIGHT: 144.5 LBS | BODY MASS INDEX: 27.28 KG/M2 | DIASTOLIC BLOOD PRESSURE: 60 MMHG | HEIGHT: 61 IN

## 2021-10-15 DIAGNOSIS — O24.919 UNSPECIFIED DIABETES MELLITUS IN PREGNANCY, UNSPECIFIED TRIMESTER: ICD-10-CM

## 2021-10-15 DIAGNOSIS — D50.9 IRON DEFICIENCY ANEMIA, UNSPECIFIED: ICD-10-CM

## 2021-10-15 PROCEDURE — 76818 FETAL BIOPHYS PROFILE W/NST: CPT

## 2021-10-15 PROCEDURE — 0502F SUBSEQUENT PRENATAL CARE: CPT

## 2021-10-16 LAB
BASOPHILS # BLD AUTO: 0.02 K/UL
BASOPHILS NFR BLD AUTO: 0.4 %
EOSINOPHIL # BLD AUTO: 0.04 K/UL
EOSINOPHIL NFR BLD AUTO: 0.8 %
HCT VFR BLD CALC: 30.4 %
HGB BLD-MCNC: 9.5 G/DL
IMM GRANULOCYTES NFR BLD AUTO: 0.4 %
LYMPHOCYTES # BLD AUTO: 1.48 K/UL
LYMPHOCYTES NFR BLD AUTO: 27.9 %
MAN DIFF?: NORMAL
MCHC RBC-ENTMCNC: 29.3 PG
MCHC RBC-ENTMCNC: 31.3 GM/DL
MCV RBC AUTO: 93.8 FL
MONOCYTES # BLD AUTO: 0.33 K/UL
MONOCYTES NFR BLD AUTO: 6.2 %
NEUTROPHILS # BLD AUTO: 3.42 K/UL
NEUTROPHILS NFR BLD AUTO: 64.3 %
PLATELET # BLD AUTO: 276 K/UL
RBC # BLD: 3.24 M/UL
RBC # FLD: 17.6 %
WBC # FLD AUTO: 5.31 K/UL

## 2021-10-18 ENCOUNTER — NON-APPOINTMENT (OUTPATIENT)
Age: 21
End: 2021-10-18

## 2021-10-18 ENCOUNTER — ASOB RESULT (OUTPATIENT)
Age: 21
End: 2021-10-18

## 2021-10-18 ENCOUNTER — APPOINTMENT (OUTPATIENT)
Dept: MATERNAL FETAL MEDICINE | Facility: CLINIC | Age: 21
End: 2021-10-18
Payer: COMMERCIAL

## 2021-10-18 ENCOUNTER — APPOINTMENT (OUTPATIENT)
Dept: ANTEPARTUM | Facility: CLINIC | Age: 21
End: 2021-10-18
Payer: COMMERCIAL

## 2021-10-18 PROCEDURE — 76818 FETAL BIOPHYS PROFILE W/NST: CPT

## 2021-10-18 PROCEDURE — G0108 DIAB MANAGE TRN  PER INDIV: CPT | Mod: 95

## 2021-10-18 PROCEDURE — 76816 OB US FOLLOW-UP PER FETUS: CPT

## 2021-10-18 RX ORDER — BLOOD-GLUCOSE METER
KIT MISCELLANEOUS
Qty: 1 | Refills: 0 | Status: ACTIVE | COMMUNITY
Start: 2021-10-18 | End: 1900-01-01

## 2021-10-19 ENCOUNTER — APPOINTMENT (OUTPATIENT)
Dept: ANTEPARTUM | Facility: CLINIC | Age: 21
End: 2021-10-19

## 2021-10-20 ENCOUNTER — NON-APPOINTMENT (OUTPATIENT)
Age: 21
End: 2021-10-20

## 2021-10-21 RX ORDER — LANCETS 28 GAUGE
EACH MISCELLANEOUS
Qty: 3 | Refills: 0 | Status: ACTIVE | COMMUNITY
Start: 2018-06-04 | End: 1900-01-01

## 2021-10-21 RX ORDER — BLOOD SUGAR DIAGNOSTIC
STRIP MISCELLANEOUS
Qty: 400 | Refills: 1 | Status: ACTIVE | COMMUNITY
Start: 2019-07-23 | End: 1900-01-01

## 2021-10-21 RX ORDER — BLOOD SUGAR DIAGNOSTIC
STRIP MISCELLANEOUS
Qty: 9 | Refills: 4 | Status: ACTIVE | COMMUNITY
Start: 2018-06-04 | End: 1900-01-01

## 2021-10-22 ENCOUNTER — NON-APPOINTMENT (OUTPATIENT)
Age: 21
End: 2021-10-22

## 2021-10-22 ENCOUNTER — APPOINTMENT (OUTPATIENT)
Dept: ANTEPARTUM | Facility: CLINIC | Age: 21
End: 2021-10-22

## 2021-10-22 ENCOUNTER — APPOINTMENT (OUTPATIENT)
Dept: OBGYN | Facility: CLINIC | Age: 21
End: 2021-10-22
Payer: COMMERCIAL

## 2021-10-22 VITALS
SYSTOLIC BLOOD PRESSURE: 120 MMHG | BODY MASS INDEX: 27.61 KG/M2 | WEIGHT: 146.25 LBS | HEIGHT: 61 IN | DIASTOLIC BLOOD PRESSURE: 80 MMHG

## 2021-10-22 DIAGNOSIS — O24.013 PRE-EXISTING TYPE 1 DIABETES MELLITUS, IN PREGNANCY, THIRD TRIMESTER: ICD-10-CM

## 2021-10-22 PROCEDURE — 0502F SUBSEQUENT PRENATAL CARE: CPT

## 2021-10-24 ENCOUNTER — OUTPATIENT (OUTPATIENT)
Dept: OUTPATIENT SERVICES | Facility: HOSPITAL | Age: 21
LOS: 1 days | End: 2021-10-24
Payer: COMMERCIAL

## 2021-10-24 DIAGNOSIS — Z11.52 ENCOUNTER FOR SCREENING FOR COVID-19: ICD-10-CM

## 2021-10-24 LAB — SARS-COV-2 RNA SPEC QL NAA+PROBE: SIGNIFICANT CHANGE UP

## 2021-10-24 PROCEDURE — U0003: CPT

## 2021-10-24 PROCEDURE — U0005: CPT

## 2021-10-24 PROCEDURE — C9803: CPT

## 2021-10-25 ENCOUNTER — APPOINTMENT (OUTPATIENT)
Dept: ANTEPARTUM | Facility: CLINIC | Age: 21
End: 2021-10-25

## 2021-10-25 ENCOUNTER — APPOINTMENT (OUTPATIENT)
Dept: MATERNAL FETAL MEDICINE | Facility: CLINIC | Age: 21
End: 2021-10-25

## 2021-10-26 ENCOUNTER — NON-APPOINTMENT (OUTPATIENT)
Age: 21
End: 2021-10-26

## 2021-10-27 ENCOUNTER — NON-APPOINTMENT (OUTPATIENT)
Age: 21
End: 2021-10-27

## 2021-10-27 ENCOUNTER — INPATIENT (INPATIENT)
Facility: HOSPITAL | Age: 21
LOS: 2 days | Discharge: HOME | End: 2021-10-30
Attending: OBSTETRICS & GYNECOLOGY | Admitting: OBSTETRICS & GYNECOLOGY
Payer: COMMERCIAL

## 2021-10-27 VITALS
SYSTOLIC BLOOD PRESSURE: 127 MMHG | RESPIRATION RATE: 17 BRPM | HEART RATE: 99 BPM | TEMPERATURE: 98 F | DIASTOLIC BLOOD PRESSURE: 77 MMHG

## 2021-10-27 DIAGNOSIS — Z96.41 PRESENCE OF INSULIN PUMP (EXTERNAL) (INTERNAL): ICD-10-CM

## 2021-10-27 DIAGNOSIS — Z46.81 ENCOUNTER FOR FITTING AND ADJUSTMENT OF INSULIN PUMP: ICD-10-CM

## 2021-10-27 DIAGNOSIS — Z34.93 ENCOUNTER FOR SUPERVISION OF NORMAL PREGNANCY, UNSPECIFIED, THIRD TRIMESTER: ICD-10-CM

## 2021-10-27 DIAGNOSIS — E10.9 TYPE 1 DIABETES MELLITUS WITHOUT COMPLICATIONS: ICD-10-CM

## 2021-10-27 LAB
ALBUMIN SERPL ELPH-MCNC: 3.6 G/DL — SIGNIFICANT CHANGE UP (ref 3.3–5)
ALP SERPL-CCNC: 328 U/L — HIGH (ref 40–120)
ALT FLD-CCNC: 28 U/L — SIGNIFICANT CHANGE UP (ref 10–45)
ANION GAP SERPL CALC-SCNC: 13 MMOL/L — SIGNIFICANT CHANGE UP (ref 5–17)
APPEARANCE UR: CLEAR — SIGNIFICANT CHANGE UP
APTT BLD: 28.9 SEC — SIGNIFICANT CHANGE UP (ref 27.5–35.5)
AST SERPL-CCNC: 30 U/L — SIGNIFICANT CHANGE UP (ref 10–40)
BASOPHILS # BLD AUTO: 0.02 K/UL — SIGNIFICANT CHANGE UP (ref 0–0.2)
BASOPHILS # BLD AUTO: 0.02 K/UL — SIGNIFICANT CHANGE UP (ref 0–0.2)
BASOPHILS NFR BLD AUTO: 0.3 % — SIGNIFICANT CHANGE UP (ref 0–2)
BASOPHILS NFR BLD AUTO: 0.4 % — SIGNIFICANT CHANGE UP (ref 0–2)
BILIRUB SERPL-MCNC: 0.2 MG/DL — SIGNIFICANT CHANGE UP (ref 0.2–1.2)
BILIRUB UR-MCNC: NEGATIVE — SIGNIFICANT CHANGE UP
BLD GP AB SCN SERPL QL: NEGATIVE — SIGNIFICANT CHANGE UP
BUN SERPL-MCNC: 5 MG/DL — LOW (ref 7–23)
CALCIUM SERPL-MCNC: 9.1 MG/DL — SIGNIFICANT CHANGE UP (ref 8.4–10.5)
CHLORIDE SERPL-SCNC: 108 MMOL/L — SIGNIFICANT CHANGE UP (ref 96–108)
CO2 SERPL-SCNC: 19 MMOL/L — LOW (ref 22–31)
COLOR SPEC: SIGNIFICANT CHANGE UP
COVID-19 SPIKE DOMAIN AB INTERP: POSITIVE
COVID-19 SPIKE DOMAIN ANTIBODY RESULT: >250 U/ML — HIGH
CREAT ?TM UR-MCNC: 42 MG/DL — SIGNIFICANT CHANGE UP
CREAT SERPL-MCNC: 0.61 MG/DL — SIGNIFICANT CHANGE UP (ref 0.5–1.3)
DIFF PNL FLD: NEGATIVE — SIGNIFICANT CHANGE UP
EOSINOPHIL # BLD AUTO: 0.02 K/UL — SIGNIFICANT CHANGE UP (ref 0–0.5)
EOSINOPHIL # BLD AUTO: 0.03 K/UL — SIGNIFICANT CHANGE UP (ref 0–0.5)
EOSINOPHIL NFR BLD AUTO: 0.4 % — SIGNIFICANT CHANGE UP (ref 0–6)
EOSINOPHIL NFR BLD AUTO: 0.5 % — SIGNIFICANT CHANGE UP (ref 0–6)
FIBRINOGEN PPP-MCNC: 868 MG/DL — HIGH (ref 290–520)
GLUCOSE BLDC GLUCOMTR-MCNC: 102 MG/DL — HIGH (ref 70–99)
GLUCOSE BLDC GLUCOMTR-MCNC: 114 MG/DL — HIGH (ref 70–99)
GLUCOSE BLDC GLUCOMTR-MCNC: 122 MG/DL — HIGH (ref 70–99)
GLUCOSE BLDC GLUCOMTR-MCNC: 129 MG/DL — HIGH (ref 70–99)
GLUCOSE BLDC GLUCOMTR-MCNC: 48 MG/DL — CRITICAL LOW (ref 70–99)
GLUCOSE BLDC GLUCOMTR-MCNC: 49 MG/DL — CRITICAL LOW (ref 70–99)
GLUCOSE BLDC GLUCOMTR-MCNC: 50 MG/DL — CRITICAL LOW (ref 70–99)
GLUCOSE BLDC GLUCOMTR-MCNC: 53 MG/DL — CRITICAL LOW (ref 70–99)
GLUCOSE BLDC GLUCOMTR-MCNC: 55 MG/DL — LOW (ref 70–99)
GLUCOSE BLDC GLUCOMTR-MCNC: 57 MG/DL — LOW (ref 70–99)
GLUCOSE BLDC GLUCOMTR-MCNC: 62 MG/DL — LOW (ref 70–99)
GLUCOSE BLDC GLUCOMTR-MCNC: 64 MG/DL — LOW (ref 70–99)
GLUCOSE BLDC GLUCOMTR-MCNC: 64 MG/DL — LOW (ref 70–99)
GLUCOSE BLDC GLUCOMTR-MCNC: 69 MG/DL — LOW (ref 70–99)
GLUCOSE BLDC GLUCOMTR-MCNC: 71 MG/DL — SIGNIFICANT CHANGE UP (ref 70–99)
GLUCOSE BLDC GLUCOMTR-MCNC: 72 MG/DL — SIGNIFICANT CHANGE UP (ref 70–99)
GLUCOSE BLDC GLUCOMTR-MCNC: 79 MG/DL — SIGNIFICANT CHANGE UP (ref 70–99)
GLUCOSE BLDC GLUCOMTR-MCNC: 82 MG/DL — SIGNIFICANT CHANGE UP (ref 70–99)
GLUCOSE BLDC GLUCOMTR-MCNC: 86 MG/DL — SIGNIFICANT CHANGE UP (ref 70–99)
GLUCOSE BLDC GLUCOMTR-MCNC: 87 MG/DL — SIGNIFICANT CHANGE UP (ref 70–99)
GLUCOSE SERPL-MCNC: 83 MG/DL — SIGNIFICANT CHANGE UP (ref 70–99)
GLUCOSE UR QL: ABNORMAL
HCT VFR BLD CALC: 31.4 % — LOW (ref 34.5–45)
HCT VFR BLD CALC: 35.3 % — SIGNIFICANT CHANGE UP (ref 34.5–45)
HGB BLD-MCNC: 10.9 G/DL — LOW (ref 11.5–15.5)
HGB BLD-MCNC: 9.9 G/DL — LOW (ref 11.5–15.5)
IMM GRANULOCYTES NFR BLD AUTO: 0.4 % — SIGNIFICANT CHANGE UP (ref 0–1.5)
IMM GRANULOCYTES NFR BLD AUTO: 0.5 % — SIGNIFICANT CHANGE UP (ref 0–1.5)
INR BLD: 0.9 RATIO — SIGNIFICANT CHANGE UP (ref 0.88–1.16)
KETONES UR-MCNC: NEGATIVE — SIGNIFICANT CHANGE UP
LDH SERPL L TO P-CCNC: 166 U/L — SIGNIFICANT CHANGE UP (ref 50–242)
LEUKOCYTE ESTERASE UR-ACNC: NEGATIVE — SIGNIFICANT CHANGE UP
LYMPHOCYTES # BLD AUTO: 1.37 K/UL — SIGNIFICANT CHANGE UP (ref 1–3.3)
LYMPHOCYTES # BLD AUTO: 1.89 K/UL — SIGNIFICANT CHANGE UP (ref 1–3.3)
LYMPHOCYTES # BLD AUTO: 28.6 % — SIGNIFICANT CHANGE UP (ref 13–44)
LYMPHOCYTES # BLD AUTO: 31.7 % — SIGNIFICANT CHANGE UP (ref 13–44)
MCHC RBC-ENTMCNC: 27.9 PG — SIGNIFICANT CHANGE UP (ref 27–34)
MCHC RBC-ENTMCNC: 28.5 PG — SIGNIFICANT CHANGE UP (ref 27–34)
MCHC RBC-ENTMCNC: 30.9 GM/DL — LOW (ref 32–36)
MCHC RBC-ENTMCNC: 31.5 GM/DL — LOW (ref 32–36)
MCV RBC AUTO: 90.3 FL — SIGNIFICANT CHANGE UP (ref 80–100)
MCV RBC AUTO: 90.5 FL — SIGNIFICANT CHANGE UP (ref 80–100)
MONOCYTES # BLD AUTO: 0.3 K/UL — SIGNIFICANT CHANGE UP (ref 0–0.9)
MONOCYTES # BLD AUTO: 0.39 K/UL — SIGNIFICANT CHANGE UP (ref 0–0.9)
MONOCYTES NFR BLD AUTO: 6.3 % — SIGNIFICANT CHANGE UP (ref 2–14)
MONOCYTES NFR BLD AUTO: 6.5 % — SIGNIFICANT CHANGE UP (ref 2–14)
NEUTROPHILS # BLD AUTO: 3.06 K/UL — SIGNIFICANT CHANGE UP (ref 1.8–7.4)
NEUTROPHILS # BLD AUTO: 3.6 K/UL — SIGNIFICANT CHANGE UP (ref 1.8–7.4)
NEUTROPHILS NFR BLD AUTO: 60.5 % — SIGNIFICANT CHANGE UP (ref 43–77)
NEUTROPHILS NFR BLD AUTO: 63.9 % — SIGNIFICANT CHANGE UP (ref 43–77)
NITRITE UR-MCNC: NEGATIVE — SIGNIFICANT CHANGE UP
NRBC # BLD: 0 /100 WBCS — SIGNIFICANT CHANGE UP (ref 0–0)
NRBC # BLD: 0 /100 WBCS — SIGNIFICANT CHANGE UP (ref 0–0)
PH UR: 6.5 — SIGNIFICANT CHANGE UP (ref 5–8)
PLATELET # BLD AUTO: 289 K/UL — SIGNIFICANT CHANGE UP (ref 150–400)
PLATELET # BLD AUTO: 314 K/UL — SIGNIFICANT CHANGE UP (ref 150–400)
POTASSIUM SERPL-MCNC: 4.8 MMOL/L — SIGNIFICANT CHANGE UP (ref 3.5–5.3)
POTASSIUM SERPL-SCNC: 4.8 MMOL/L — SIGNIFICANT CHANGE UP (ref 3.5–5.3)
PROT ?TM UR-MCNC: 12 MG/DL — SIGNIFICANT CHANGE UP (ref 0–12)
PROT SERPL-MCNC: 6.9 G/DL — SIGNIFICANT CHANGE UP (ref 6–8.3)
PROT UR-MCNC: NEGATIVE — SIGNIFICANT CHANGE UP
PROT/CREAT UR-RTO: 0.3 RATIO — HIGH (ref 0–0.2)
PROTHROM AB SERPL-ACNC: 10.9 SEC — SIGNIFICANT CHANGE UP (ref 10.6–13.6)
RBC # BLD: 3.47 M/UL — LOW (ref 3.8–5.2)
RBC # BLD: 3.91 M/UL — SIGNIFICANT CHANGE UP (ref 3.8–5.2)
RBC # FLD: 15.9 % — HIGH (ref 10.3–14.5)
RBC # FLD: 16.1 % — HIGH (ref 10.3–14.5)
RH IG SCN BLD-IMP: POSITIVE — SIGNIFICANT CHANGE UP
SARS-COV-2 IGG+IGM SERPL QL IA: >250 U/ML — HIGH
SARS-COV-2 IGG+IGM SERPL QL IA: POSITIVE
SODIUM SERPL-SCNC: 140 MMOL/L — SIGNIFICANT CHANGE UP (ref 135–145)
SP GR SPEC: 1.01 — SIGNIFICANT CHANGE UP (ref 1.01–1.02)
T PALLIDUM AB TITR SER: NEGATIVE — SIGNIFICANT CHANGE UP
URATE SERPL-MCNC: 4.4 MG/DL — SIGNIFICANT CHANGE UP (ref 2.5–7)
UROBILINOGEN FLD QL: NEGATIVE — SIGNIFICANT CHANGE UP
WBC # BLD: 4.79 K/UL — SIGNIFICANT CHANGE UP (ref 3.8–10.5)
WBC # BLD: 5.96 K/UL — SIGNIFICANT CHANGE UP (ref 3.8–10.5)
WBC # FLD AUTO: 4.79 K/UL — SIGNIFICANT CHANGE UP (ref 3.8–10.5)
WBC # FLD AUTO: 5.96 K/UL — SIGNIFICANT CHANGE UP (ref 3.8–10.5)

## 2021-10-27 PROCEDURE — 93010 ELECTROCARDIOGRAM REPORT: CPT

## 2021-10-27 PROCEDURE — 59400 OBSTETRICAL CARE: CPT | Mod: GC

## 2021-10-27 PROCEDURE — 99222 1ST HOSP IP/OBS MODERATE 55: CPT

## 2021-10-27 RX ORDER — MAGNESIUM HYDROXIDE 400 MG/1
30 TABLET, CHEWABLE ORAL
Refills: 0 | Status: DISCONTINUED | OUTPATIENT
Start: 2021-10-27 | End: 2021-10-30

## 2021-10-27 RX ORDER — SODIUM CHLORIDE 9 MG/ML
1000 INJECTION, SOLUTION INTRAVENOUS
Refills: 0 | Status: DISCONTINUED | OUTPATIENT
Start: 2021-10-27 | End: 2021-10-27

## 2021-10-27 RX ORDER — AER TRAVELER 0.5 G/1
1 SOLUTION RECTAL; TOPICAL EVERY 4 HOURS
Refills: 0 | Status: DISCONTINUED | OUTPATIENT
Start: 2021-10-27 | End: 2021-10-30

## 2021-10-27 RX ORDER — OXYCODONE HYDROCHLORIDE 5 MG/1
5 TABLET ORAL
Refills: 0 | Status: DISCONTINUED | OUTPATIENT
Start: 2021-10-27 | End: 2021-10-30

## 2021-10-27 RX ORDER — AMPICILLIN TRIHYDRATE 250 MG
1 CAPSULE ORAL EVERY 4 HOURS
Refills: 0 | Status: DISCONTINUED | OUTPATIENT
Start: 2021-10-27 | End: 2021-10-28

## 2021-10-27 RX ORDER — DEXTROSE 50 % IN WATER 50 %
50 SYRINGE (ML) INTRAVENOUS
Refills: 0 | Status: DISCONTINUED | OUTPATIENT
Start: 2021-10-27 | End: 2021-10-30

## 2021-10-27 RX ORDER — DIPHENHYDRAMINE HCL 50 MG
25 CAPSULE ORAL EVERY 6 HOURS
Refills: 0 | Status: DISCONTINUED | OUTPATIENT
Start: 2021-10-27 | End: 2021-10-30

## 2021-10-27 RX ORDER — DEXTROSE 50 % IN WATER 50 %
15 SYRINGE (ML) INTRAVENOUS ONCE
Refills: 0 | Status: DISCONTINUED | OUTPATIENT
Start: 2021-10-27 | End: 2021-10-30

## 2021-10-27 RX ORDER — LANOLIN
1 OINTMENT (GRAM) TOPICAL EVERY 6 HOURS
Refills: 0 | Status: DISCONTINUED | OUTPATIENT
Start: 2021-10-27 | End: 2021-10-30

## 2021-10-27 RX ORDER — KETOROLAC TROMETHAMINE 30 MG/ML
30 SYRINGE (ML) INJECTION ONCE
Refills: 0 | Status: DISCONTINUED | OUTPATIENT
Start: 2021-10-27 | End: 2021-10-28

## 2021-10-27 RX ORDER — FAMOTIDINE 10 MG/ML
20 INJECTION INTRAVENOUS ONCE
Refills: 0 | Status: COMPLETED | OUTPATIENT
Start: 2021-10-27 | End: 2021-10-27

## 2021-10-27 RX ORDER — GLUCAGON INJECTION, SOLUTION 0.5 MG/.1ML
1 INJECTION, SOLUTION SUBCUTANEOUS ONCE
Refills: 0 | Status: DISCONTINUED | OUTPATIENT
Start: 2021-10-27 | End: 2021-10-30

## 2021-10-27 RX ORDER — IBUPROFEN 200 MG
600 TABLET ORAL EVERY 6 HOURS
Refills: 0 | Status: COMPLETED | OUTPATIENT
Start: 2021-10-27 | End: 2022-09-25

## 2021-10-27 RX ORDER — SIMETHICONE 80 MG/1
80 TABLET, CHEWABLE ORAL EVERY 4 HOURS
Refills: 0 | Status: DISCONTINUED | OUTPATIENT
Start: 2021-10-27 | End: 2021-10-30

## 2021-10-27 RX ORDER — ONDANSETRON 8 MG/1
4 TABLET, FILM COATED ORAL ONCE
Refills: 0 | Status: COMPLETED | OUTPATIENT
Start: 2021-10-27 | End: 2021-10-27

## 2021-10-27 RX ORDER — TETANUS TOXOID, REDUCED DIPHTHERIA TOXOID AND ACELLULAR PERTUSSIS VACCINE, ADSORBED 5; 2.5; 8; 8; 2.5 [IU]/.5ML; [IU]/.5ML; UG/.5ML; UG/.5ML; UG/.5ML
0.5 SUSPENSION INTRAMUSCULAR ONCE
Refills: 0 | Status: DISCONTINUED | OUTPATIENT
Start: 2021-10-27 | End: 2021-10-30

## 2021-10-27 RX ORDER — AMPICILLIN TRIHYDRATE 250 MG
2 CAPSULE ORAL ONCE
Refills: 0 | Status: COMPLETED | OUTPATIENT
Start: 2021-10-27 | End: 2021-10-27

## 2021-10-27 RX ORDER — DEXTROSE 50 % IN WATER 50 %
12.5 SYRINGE (ML) INTRAVENOUS ONCE
Refills: 0 | Status: DISCONTINUED | OUTPATIENT
Start: 2021-10-27 | End: 2021-10-30

## 2021-10-27 RX ORDER — SODIUM CHLORIDE 9 MG/ML
1000 INJECTION, SOLUTION INTRAVENOUS
Refills: 0 | Status: DISCONTINUED | OUTPATIENT
Start: 2021-10-27 | End: 2021-10-30

## 2021-10-27 RX ORDER — OXYCODONE HYDROCHLORIDE 5 MG/1
5 TABLET ORAL ONCE
Refills: 0 | Status: DISCONTINUED | OUTPATIENT
Start: 2021-10-27 | End: 2021-10-30

## 2021-10-27 RX ORDER — OXYTOCIN 10 UNIT/ML
4 VIAL (ML) INJECTION
Qty: 30 | Refills: 0 | Status: DISCONTINUED | OUTPATIENT
Start: 2021-10-27 | End: 2021-10-30

## 2021-10-27 RX ORDER — DEXTROSE 50 % IN WATER 50 %
25 SYRINGE (ML) INTRAVENOUS
Refills: 0 | Status: DISCONTINUED | OUTPATIENT
Start: 2021-10-27 | End: 2021-10-30

## 2021-10-27 RX ORDER — DIBUCAINE 1 %
1 OINTMENT (GRAM) RECTAL EVERY 6 HOURS
Refills: 0 | Status: DISCONTINUED | OUTPATIENT
Start: 2021-10-27 | End: 2021-10-30

## 2021-10-27 RX ORDER — CITRIC ACID/SODIUM CITRATE 300-500 MG
15 SOLUTION, ORAL ORAL EVERY 6 HOURS
Refills: 0 | Status: DISCONTINUED | OUTPATIENT
Start: 2021-10-27 | End: 2021-10-28

## 2021-10-27 RX ORDER — SODIUM CHLORIDE 9 MG/ML
1000 INJECTION INTRAMUSCULAR; INTRAVENOUS; SUBCUTANEOUS
Refills: 0 | Status: DISCONTINUED | OUTPATIENT
Start: 2021-10-27 | End: 2021-10-27

## 2021-10-27 RX ORDER — DEXTROSE 50 % IN WATER 50 %
25 SYRINGE (ML) INTRAVENOUS ONCE
Refills: 0 | Status: DISCONTINUED | OUTPATIENT
Start: 2021-10-27 | End: 2021-10-30

## 2021-10-27 RX ORDER — INSULIN HUMAN 100 [IU]/ML
0.5 INJECTION, SOLUTION SUBCUTANEOUS
Qty: 100 | Refills: 0 | Status: DISCONTINUED | OUTPATIENT
Start: 2021-10-27 | End: 2021-10-30

## 2021-10-27 RX ORDER — HYDROCORTISONE 1 %
1 OINTMENT (GRAM) TOPICAL EVERY 6 HOURS
Refills: 0 | Status: DISCONTINUED | OUTPATIENT
Start: 2021-10-27 | End: 2021-10-30

## 2021-10-27 RX ORDER — PRAMOXINE HYDROCHLORIDE 150 MG/15G
1 AEROSOL, FOAM RECTAL EVERY 4 HOURS
Refills: 0 | Status: DISCONTINUED | OUTPATIENT
Start: 2021-10-27 | End: 2021-10-30

## 2021-10-27 RX ORDER — ACETAMINOPHEN 500 MG
975 TABLET ORAL
Refills: 0 | Status: DISCONTINUED | OUTPATIENT
Start: 2021-10-27 | End: 2021-10-28

## 2021-10-27 RX ORDER — OXYTOCIN 10 UNIT/ML
333.33 VIAL (ML) INJECTION
Qty: 20 | Refills: 0 | Status: DISCONTINUED | OUTPATIENT
Start: 2021-10-27 | End: 2021-10-30

## 2021-10-27 RX ORDER — SODIUM CHLORIDE 9 MG/ML
3 INJECTION INTRAMUSCULAR; INTRAVENOUS; SUBCUTANEOUS EVERY 8 HOURS
Refills: 0 | Status: DISCONTINUED | OUTPATIENT
Start: 2021-10-27 | End: 2021-10-30

## 2021-10-27 RX ORDER — BENZOCAINE 10 %
1 GEL (GRAM) MUCOUS MEMBRANE EVERY 6 HOURS
Refills: 0 | Status: DISCONTINUED | OUTPATIENT
Start: 2021-10-27 | End: 2021-10-30

## 2021-10-27 RX ORDER — DEXTROSE 50 % IN WATER 50 %
15 SYRINGE (ML) INTRAVENOUS ONCE
Refills: 0 | Status: COMPLETED | OUTPATIENT
Start: 2021-10-27 | End: 2021-10-27

## 2021-10-27 RX ADMIN — INSULIN HUMAN 0.5 UNIT(S)/HR: 100 INJECTION, SOLUTION SUBCUTANEOUS at 16:44

## 2021-10-27 RX ADMIN — Medication 108 GRAM(S): at 17:50

## 2021-10-27 RX ADMIN — Medication 108 GRAM(S): at 13:33

## 2021-10-27 RX ADMIN — Medication 4 MILLIUNIT(S)/MIN: at 13:27

## 2021-10-27 RX ADMIN — Medication 108 GRAM(S): at 09:36

## 2021-10-27 RX ADMIN — SODIUM CHLORIDE 25 MILLILITER(S): 9 INJECTION, SOLUTION INTRAVENOUS at 17:16

## 2021-10-27 RX ADMIN — SODIUM CHLORIDE 100 MILLILITER(S): 9 INJECTION, SOLUTION INTRAVENOUS at 17:16

## 2021-10-27 RX ADMIN — Medication 216 GRAM(S): at 04:12

## 2021-10-27 RX ADMIN — FAMOTIDINE 20 MILLIGRAM(S): 10 INJECTION INTRAVENOUS at 05:29

## 2021-10-27 RX ADMIN — Medication 108 GRAM(S): at 21:50

## 2021-10-27 RX ADMIN — SODIUM CHLORIDE 125 MILLILITER(S): 9 INJECTION, SOLUTION INTRAVENOUS at 04:18

## 2021-10-27 RX ADMIN — Medication 15 GRAM(S): at 14:25

## 2021-10-27 RX ADMIN — ONDANSETRON 4 MILLIGRAM(S): 8 TABLET, FILM COATED ORAL at 08:35

## 2021-10-27 NOTE — CHART NOTE - NSCHARTNOTEFT_GEN_A_CORE
PA Note  Called to pt's bedside shortly after epidural placement for complaints of numbness, weakness and difficulty swallowing. Pt seen lying in bed with eyes closed. After much prompting pt answered questions and was able to squeeze hands. Dr Isaac and anesthesiologist at bedside as well.   T(C): 36.8 (10-27-21 @ 07:41), Max: 36.8 (10-27-21 @ 04:21)  HR: 79 (10-27-21 @ 09:24) (77 - 131)  BP: 139/78 (10-27-21 @ 09:22) (100/50 - 159/90)  RR: 17 (10-27-21 @ 07:41) (17 - 17)  SpO2: 96% (10-27-21 @ 09:24) (88% - 100%)  GEN:NAD  CV:tachycardic  Pulm: CTA bl  Abd soft NT gravid  FHT:  150   , min-->mod earl, + accels, 1 late decels   TOCO:  irreg q1-5m  VE: 1/50/-3/grossly ruptured clear fluid    AP 20yo  @ 37w4d IOL for T1DM, likely very fatigued from not sleeping overnight, likely not epidural related per anesthesia  -EKG done, read by anesthesia as normal  -T1DM, initially hypoglycemic, last , basal rate decreased by pt, endocrine consulted  -IOL sp VC, srom, for PO cytotec if ctx space out  -mildly elevated BP's, will monitor, f/u hellp labs  DW Dr Poncho Cotter PAC

## 2021-10-27 NOTE — OB PROVIDER LABOR PROGRESS NOTE - NS_OBIHIFHRDETAILS_OBGYN_ALL_OB_FT
baseline 150, mod variability, + accels, no decels
Baseline 140 Moderate variability. +Accels - Decels
baseline 135, mod variability, + accels, no decels
baseline 150 min to mod variability, + accels, rare variable decels
bsaeline 140 mod variablity, + accels, no decels

## 2021-10-27 NOTE — OB PROVIDER LABOR PROGRESS NOTE - ASSESSMENT
OB attg note    22yo  at 37+4 with poorly controlled T1DM here for IOL. Hx of T1DM with hx of DKA prior to pregnancy, currently on insulin pump. Hypoglycemic on admission and decreased basal rate, now -120s. GBS pos on abx. CARMEN wnl and EFW wnl, last on 10/18 2768g (38%). SROMed for clear fluid this AM after 1 dose of vag cyto, now jerry regularly and SVE /-3. After epidural had episode of feeling "numb all over" with difficulty swallowing. Pt examined at bedside with anesthesia, BPs wnl, HR low 100s, cEFM reassuring, O2 sat wnl. Pt appeared sleepy but had no focal neurologic deficits, able to speak and swallow normally. Said she felt tired because had not slept in 2 days. EKG sinus tachy, reviewed w/ anesthesia. Clinical picture reassuring at this time. Continue cytotec if contractions space. Anticipate .    Aly MURILLO OB attg note    20yo  at 37+4 with poorly controlled T1DM here for IOL. Hx of T1DM with hx of DKA prior to pregnancy, currently on insulin pump. Hypoglycemic on admission and decreased basal rate, now -120s. Using own insulin pump, for endo consult.  GBS pos on abx. CARMEN wnl and EFW wnl, last on 10/18 2768g (38%). SROMed for clear fluid this AM after 1 dose of vag cyto, now jerry regularly and SVE /-3. After epidural had episode of feeling "numb all over" with difficulty swallowing. Pt examined at bedside with anesthesia, BPs wnl, HR low 100s, cEFM reassuring, O2 sat wnl. Pt appeared sleepy but had no focal neurologic deficits, able to speak and swallow normally. Said she felt tired because had not slept in 2 days. EKG sinus tachy, reviewed w/ anesthesia. Clinical picture reassuring at this time. Continue cytotec if contractions space. Anticipate .    Aly MURILLO

## 2021-10-27 NOTE — CONSULT NOTE ADULT - ASSESSMENT
Pt is a 22yo  @37 weeks presenting for labor and delivery.      1. Type 1 DM  Patient is currently NPO, glucose trending in 70s.    Met with patient, adjust insulin pump to decreased of basal rate by 30% for 12 hour, ending around 11:30 pm.   Basal Rates:   12 am  2.1 unit/hour  3am 1.8 units/hour  10am 2.1 units/hour  10pm 1.55 units/hour    Insulin to carb ratio: 6  Correction factor: 55    BGT: 120  IOB: 4 hours    After delivery, patient should change insulin pump to the following postpartum settings  Basal rate:  12am-12am: 1.3 unit/hour  Carb ratio:  12am-12am: 1:12 gram/unit  ISF:  12am-12am: 1:55 mg/dl/unit  Active insulin time;4 hours  Blood glucose target 120mg/dl    Mom took notes of these settings.  Patient feels comfortable adjusting her pump.  Pump order will in [place].   Nursing to document glucose and insulin given before each meals and at bedtime.     Please contact endocrine team if any hyper or hypoglycemia (>70mg/ld and greater than 250mg/dl)    Raimundo Lind MD  Pager [phone], short range [phone]  Please provide 10 digit call back number if any questions.    For after hours or weekends please call [phone] or page fellow on call.

## 2021-10-27 NOTE — PRE-ANESTHESIA EVALUATION ADULT - HEIGHT IN INCHES
2/2 to nicotine replacement lozenges  Will do patches and have her wean    Continue to watch close follow-up 1

## 2021-10-27 NOTE — OB RN PATIENT PROFILE - BP NONINVASIVE SYSTOLIC (MM HG)
Juan Miguel Vogel  INTERNAL MEDICINE  [address], [place] [address]  Phone: [phone]  Fax: [phone]  Follow Up Time: 2 weeks   127

## 2021-10-27 NOTE — OB PROVIDER LABOR PROGRESS NOTE - ASSESSMENT
Now on insulin gtt, FS well controlled. Transitioning into active labor, pit at 10. Ruled in for PEC wo SF. Requesting anesthsia enforcement. Anticipate , continue amp for GBS+.

## 2021-10-27 NOTE — OB PROVIDER LABOR PROGRESS NOTE - ASSESSMENT
SVE 3/80/-2, s/p PO and will switch to pitocin 4x4. Intermittent mild range BPs, HELLP labs wnl, UP:C pending. GBS pos. T1DM on own insulin pump, FS as below, endo pending. Anticipate .    CAPILLARY BLOOD GLUCOSE      POCT Blood Glucose.: 71 mg/dL (27 Oct 2021 12:01)  POCT Blood Glucose.: 79 mg/dL (27 Oct 2021 11:00)  POCT Blood Glucose.: 114 mg/dL (27 Oct 2021 08:52)  POCT Blood Glucose.: 122 mg/dL (27 Oct 2021 08:17)  POCT Blood Glucose.: 129 mg/dL (27 Oct 2021 06:59)  POCT Blood Glucose.: 53 mg/dL (27 Oct 2021 05:57)  POCT Blood Glucose.: 82 mg/dL (27 Oct 2021 03:39)

## 2021-10-27 NOTE — OB RN PATIENT PROFILE - DATE OF LAST VACCINATION
RX PROGRESS NOTE: Vancomycin Therapeutic Drug Monitoring      Indication for therapy: Skin and soft tissue infection    ALLERGIES:   Allergen Reactions   • Demerol PRURITUS   • Penicillins RASH       Most recent height and weight information:  Weight: 53 kg (02/04/19 1422)  Height: 5' 1\" (154.9 cm) (02/04/19 1422)    The Following are the calculated  Current Weights for Nilsa Goldman     Adjusted Ideal        49.9 kg 47.8 kg               Labs:  Serum Creatinine and Creatinine Clearance:  Serum creatinine: 7.3 mg/dL (H) 02/04/19 1024  Estimated creatinine clearance: 4.3 mL/min (A)    Maximum Temperature (last 24 hours)     Value Max    Temp  98.4 °F (36.9 °C)        WBC (K/mcL)   Date/Time Value   02/04/2019 1024 12.7 (H)   02/02/2019 2100 9.7     Microbiology Results  (Last 10 results in the past 7 days)    Specimen   Gram Smear   Culture Result   Status      02/03/19  1315   02/03/19  1315  02/03/19  1315     BLOOD, PERIPHERAL HAND, RIGHT   NO GROWTH <24 HRS. PENDING            Assessment/Plan:  Briefly, this is a 85 year old female started on vancomycin for Skin and soft tissue infection, with a target serum trough concentration of 10-15 mcg/mL.  Initial dosing regimen will be 1000 mg loading dose today, followed by a maintenance dose of 500 mg after each dialysis session.    Pharmacy will monitor levels as appropriate.    Pharmacy will continue to monitor patient (renal function, microbiology data, risk factors for adverse events, appropriate duration of therapy), will order/monitor serum levels as appropriate, and will adjust dose if/when necessary.      Thank you,    Sonny Philip [facility]  2/4/2019 4:01 PM     08-Jul-2021

## 2021-10-27 NOTE — OB PROVIDER H&P - ASSESSMENT
21 year old  @ 37 weeks 4 days presenting for IOL for T1DM     -Admit to Labor and Delivery   -Continuous Monitoring   -Rotating Fluids   -FS   -Pump settings shown below   - 4 Vaginal Cytotec   -NPO     Current pump settings    Start Time: 12:00 AM ----- 2.10 units/hour   Start Time: 3a -----  1.9 units/hour   Start Time: 10a -----  2.1 units/hour   Start Time: 10p ----- 1.55 units/hour   Start Time: -----         Insulin to Carb Ration (I:C):   Start Time: 12:00 AM ----- 6 units/hour   Start Time: 6p -----  6 units/hour          Insulin Sensitivity Factor = 55              Post partum pump settings: 12 am 1.3 u/hr    ICR 1:12   ISF 1:55        d/w Dr. Aric Hernandez, PGY2     Addendum    Pump setting for 3a ---1.8 units/ hour 21 year old  @ 37 weeks 4 days presenting for IOL for T1DM     -Admit to Labor and Delivery   -Continuous Monitoring   -Rotating Fluids   -FS   -Pump settings shown below   - 4 Vaginal Cytotec   -NPO     Current pump settings    Start Time: 12:00 AM ----- 2.10 units/hour   Start Time: 3a -----  1.9 units/hour   Start Time: 10a -----  2.1 units/hour   Start Time: 10p ----- 1.55 units/hour   Start Time: -----         Insulin to Carb Ration (I:C):   Start Time: 12:00 AM ----- 6 units/hour   Start Time: 6p -----  6 units/hour          Insulin Sensitivity Factor = 55              Post partum pump settings: 12 am 1.3 u/hr    ICR 1:12   ISF 1:55        d/w Dr. Aric Hernandez, PGY2     Addendum    Pump setting for 3a ---1.8 units/ hour

## 2021-10-27 NOTE — OB PROVIDER LABOR PROGRESS NOTE - ASSESSMENT
Pt placed on peanut ball to encourage further decent. Will re-evaluate and start pushing after 30 min.  Encouraged pt to bear down.    FS: 69. Given apple juice, will repeat level in 15 min.     DW: Dr. Poncho Pompa, PGY-1

## 2021-10-27 NOTE — OB PROVIDER H&P - HISTORY OF PRESENT ILLNESS
20 yo  @ 37w 4d p/f IOL for T1DM with neuropathy. –VB, -LOF, -Ctx, +FM. denies fever, chills, nausea, vomiting, diarrhea, headache, constipation, dizziness, syncope, chest pain, palpitations, shortness of breath, dysuria, urgency, frequency.  Patient was admitted to Antepartum on 10/5-10/8 for Oligo (CARMEN:3.6) and Glucose control. On that admission insulin was titrated and CARMEN had returned to normal range.   Of note patients glucose was 42 while in the waiting room at 2 am, patient had juice along with glucose gel.    PNC: followed by MFM for h/o T1DM, pt on omnipod insulin pump (novolog), insulin settings below; h/o tachycardia this pregnancy-seen by PCP (who is a cardiologist), ECHO @ [facility] from  wnl, EF=68%  GBS bacturia   EFW:2700g  ObHx: TOPx1 s/p D&C Oct 2020  GynHx: denies h/o fibroids, abnormal cervical exams, STIs  PMH: T1DM diagnosed at 13yo (hospitalized at that time for DKA, no hospitalizations since that time), A1c 6.8%   PSH: D&C  Meds: PNV, novolog insulin  Allx: ciprofloxacin (intolerance->vomiting)  Psych: denies  Social: denies h/o tobacco, drug, alcohol use this pregnancy 20 yo  @ 37w 4d p/f IOL for T1DM with neuropathy. –VB, -LOF, -Ctx, +FM. denies fever, chills, nausea, vomiting, diarrhea, headache, constipation, dizziness, syncope, chest pain, palpitations, shortness of breath, dysuria, urgency, frequency.  Patient was admitted to Antepartum on 10/5-10/8 for Oligo (CARMEN:3.6) and Glucose control. On that admission insulin was titrated and CARMEN had returned to normal range prior to discharge.     Of note patients glucose was 42 while in the waiting room at 2 am, patient had juice along with glucose gel.    PNC: followed by MFM for h/o T1DM, pt on omnipod insulin pump (novolog), insulin settings below; h/o tachycardia this pregnancy-seen by PCP (who is a cardiologist), ECHO @ [facility] from  wnl, EF=68%  GBS bacteruria   EFW:2700g  ObHx: TOPx1 s/p D&C Oct 2020  GynHx: denies h/o fibroids, abnormal cervical exams, STIs  PMH: T1DM diagnosed at 13yo (hospitalized at that time for DKA, no hospitalizations since that time), A1c 6.8%   PSH: D&C   Meds: PNV, novolog insulin  Allx: ciprofloxacin (intolerance->vomiting)  Psych: denies  Social: denies h/o tobacco, drug, alcohol use this pregnancy

## 2021-10-27 NOTE — OB PROVIDER H&P - NSHPPHYSICALEXAM_GEN_ALL_CORE
NAD  CV:RRR  Resp:CTA b/l   Abd: Nontender Gravid   SVE: 0/0/-3   fht: Baseline 140, Moderate Variability, + Accels, - Decels   Pierre Part: Irregular

## 2021-10-27 NOTE — OB PROVIDER LABOR PROGRESS NOTE - ASSESSMENT
OB attg note    UP:C 0.3 intermittent mild range BPs, now with PEC without SF. SVE 4/80/-2, continue pit titration. Noted to have hypoglycemic episode to 48 despite decreasing basal rate to 30%, confirmed on pt's pump settings. Pt's insulin pump paused and given glucose gel, FS 60s on repeat. Per endo plan to decrease pump further to 40-50% of regular basal rate. Most recent FS >80. Discussed with MFM, plan to switch over to insulin drip given difficult to control FS but for now will restart basal rate to 50% to transition to drip. Discussed w/ pt, all questions answered.    Aly MURILLO

## 2021-10-27 NOTE — CONSULT NOTE ADULT - SUBJECTIVE AND OBJECTIVE BOX
ENDOCRINOLOGY CONSULT  Reason for consult: T1DM and insulin pump management    HPI:  20 yo  @ 37w 4d p/f IOL for T1DM with neuropathy. –VB, -LOF, -Ctx, +FM. denies fever, chills, nausea, vomiting, diarrhea, headache, constipation, dizziness, syncope, chest pain, palpitations, shortness of breath, dysuria, urgency, frequency.  Patient was admitted to Antepartum on 10/5-10/8 for Oligo (CARMEN:3.6) and Glucose control. On that admission insulin was titrated and CARMEN had returned to normal range prior to discharge.     Of note patients glucose was 42 while in the waiting room at 2 am, patient had juice along with glucose gel.    PNC: followed by MFM for h/o T1DM, pt on omnipod insulin pump (novolog), insulin settings below; h/o tachycardia this pregnancy-seen by PCP (who is a cardiologist), ECHO @ [facility] from  wnl, EF=68%  GBS bacteruria   EFW:2700g  ObHx: TOPx1 s/p D&C Oct 2020  GynHx: denies h/o fibroids, abnormal cervical exams, STIs  PMH: T1DM diagnosed at 11yo (hospitalized at that time for DKA, no hospitalizations since that time), A1c 6.8%   PSH: D&C   Meds: PNV, novolog insulin  Allx: ciprofloxacin (intolerance->vomiting)  Psych: denies  Social: denies h/o tobacco, drug, alcohol use this pregnancy (27 Oct 2021 02:48)    Pt is a 20yo  @37 wks w/ T1DM presenting today for labor and delivery     DM diagnosis: , age 11  Last A1c: 6.8% in 2021  Endocrinologist: Dr. Lind  Insulin pump: Omnipod  Insulin used: Novolog  CGM: None  Supply: 6 days  Location of pump: left arm.   Site due to be changed: tomorrow  Basal Rates:   12 am  2.1 unit/hour  3am 1.8 units/hour  10am 2.1 units/hour  10pm 1.55 units/hour    Insulin to carb ratio: 6  Correction factor: 55    BGT: 120  IOB: 4 hours  Attestation form: Completed  Backup insulin at home in case of pump malfunction: Patient has  Glucagon at home: Patient has  Medical alert bracelet: Patient does not have  Microvascular complications: No known renal complications. No retinopathy though is due for dilated eye exam. No neuropathy.  Macrovascular complications: None    Currently feeling a little tired,  received epidural,  currently NPO.      FAMILY HISTORY:  No pertinent family history in first degree relatives    Family history of hypothyroidism (Mother)        Social History:  Denies tobacco, etoh, or drug use.    Outpatient Medications:  MEDICATIONS  (STANDING):  ampicillin  IVPB 1 Gram(s) IV Intermittent every 4 hours  citric acid/sodium citrate Solution 15 milliLiter(s) Oral every 6 hours  dextrose 40% Gel 15 Gram(s) Oral once  dextrose 5% + sodium chloride 0.9%. 1000 milliLiter(s) (125 mL/Hr) IV Continuous <Continuous>  dextrose 5%. 1000 milliLiter(s) (50 mL/Hr) IV Continuous <Continuous>  dextrose 5%. 1000 milliLiter(s) (100 mL/Hr) IV Continuous <Continuous>  dextrose 50% Injectable 25 Gram(s) IV Push once  dextrose 50% Injectable 12.5 Gram(s) IV Push once  dextrose 50% Injectable 25 Gram(s) IV Push once  glucagon  Injectable 1 milliGRAM(s) IntraMuscular once  lactated ringers. 1000 milliLiter(s) (125 mL/Hr) IV Continuous <Continuous>  misoprostol 25 MICROGram(s) Vaginal every 3 hours  misoprostol Oral Solution 20 MICROGram(s) Oral every 2 hours  misoprostol Oral Solution 40 MICROGram(s) Oral every 2 hours  misoprostol Oral Solution 60 MICROGram(s) Oral every 2 hours  oxytocin Infusion 333.333 milliUNIT(s)/Min (1000 mL/Hr) IV Continuous <Continuous>  sodium chloride 0.9%. 1000 milliLiter(s) (125 mL/Hr) IV Continuous <Continuous>    MEDICATIONS  (PRN):      Allergies    Cipro (Vomiting)    Intolerances        Review of Systems:  Constitutional: No fever, good appetite/po intake  Eyes: No blurry vision, diplopia  Neuro: No tremors  HEENT: No pain  Cardiovascular: No chest pain, palpitations  Respiratory: No SOB, no cough  GI: No nausea, vomiting,   : No dysuria, hematuria  Skin: no rash  Psych: no depression  Endocrine: no polyuria, polydipsia  Hem/lymph: no swelling  Osteoporosis: no fractures    ALL OTHER SYSTEMS REVIEWED AND ARE NEGATIVE    PHYSICAL EXAM:  VITALS: T(C): 36.8 (10-27-21 @ 07:41)  T(F): 98.24 (10-27-21 @ 04:21), Max: 98.24 (10-27-21 @ 04:21)  HR: 85 (10-27-21 @ 11:42) (67 - 131)  BP: 148/- (10-27-21 @ 11:50) (100/50 - 159/90)  RR:  (17 - 17)  SpO2:  (88% - 100%)  Wt(kg): --  GENERAL: NAD, well-groomed, well-developed  EYES: No proptosis, no lid lag, anicteric, extraocular movements intact  HEENT:  Atraumatic, Normocephalic, moist mucous membranes  THYROID: Normal size, no palpable nodules, no thyromegaly  RESPIRATORY: Clear to auscultation bilaterally; No rales, rhonchi, wheezing, or rubs  CARDIOVASCULAR: Regular rate and rhythm; No murmurs; no peripheral edema  GI: Soft, nontender, non distended, normal bowel sounds  SKIN: Dry, intact, No rashes or lesions  NEURO: sensation intact, no tremor  EXTREMITIES: no foot ulcers and bilateral distal pedal pulses intact  PSYCH: reactive affect, euthymic mood                              10.9   4.79  )-----------( 314      ( 27 Oct 2021 11:29 )             35.3

## 2021-10-28 LAB
ALBUMIN SERPL ELPH-MCNC: 2.5 G/DL — LOW (ref 3.3–5)
ALP SERPL-CCNC: 253 U/L — HIGH (ref 40–120)
ALT FLD-CCNC: 21 U/L — SIGNIFICANT CHANGE UP (ref 10–45)
ANION GAP SERPL CALC-SCNC: 11 MMOL/L — SIGNIFICANT CHANGE UP (ref 5–17)
APTT BLD: 25.1 SEC — LOW (ref 27.5–35.5)
AST SERPL-CCNC: 30 U/L — SIGNIFICANT CHANGE UP (ref 10–40)
BASOPHILS # BLD AUTO: 0.03 K/UL — SIGNIFICANT CHANGE UP (ref 0–0.2)
BASOPHILS NFR BLD AUTO: 0.1 % — SIGNIFICANT CHANGE UP (ref 0–2)
BILIRUB SERPL-MCNC: 0.4 MG/DL — SIGNIFICANT CHANGE UP (ref 0.2–1.2)
BUN SERPL-MCNC: 6 MG/DL — LOW (ref 7–23)
CALCIUM SERPL-MCNC: 8.5 MG/DL — SIGNIFICANT CHANGE UP (ref 8.4–10.5)
CHLORIDE SERPL-SCNC: 107 MMOL/L — SIGNIFICANT CHANGE UP (ref 96–108)
CO2 SERPL-SCNC: 18 MMOL/L — LOW (ref 22–31)
CREAT SERPL-MCNC: 0.89 MG/DL — SIGNIFICANT CHANGE UP (ref 0.5–1.3)
EOSINOPHIL # BLD AUTO: 0 K/UL — SIGNIFICANT CHANGE UP (ref 0–0.5)
EOSINOPHIL NFR BLD AUTO: 0 % — SIGNIFICANT CHANGE UP (ref 0–6)
FIBRINOGEN PPP-MCNC: 621 MG/DL — HIGH (ref 290–520)
GLUCOSE BLDC GLUCOMTR-MCNC: 100 MG/DL — HIGH (ref 70–99)
GLUCOSE BLDC GLUCOMTR-MCNC: 131 MG/DL — HIGH (ref 70–99)
GLUCOSE BLDC GLUCOMTR-MCNC: 147 MG/DL — HIGH (ref 70–99)
GLUCOSE BLDC GLUCOMTR-MCNC: 149 MG/DL — HIGH (ref 70–99)
GLUCOSE BLDC GLUCOMTR-MCNC: 152 MG/DL — HIGH (ref 70–99)
GLUCOSE BLDC GLUCOMTR-MCNC: 161 MG/DL — HIGH (ref 70–99)
GLUCOSE BLDC GLUCOMTR-MCNC: 181 MG/DL — HIGH (ref 70–99)
GLUCOSE BLDC GLUCOMTR-MCNC: 196 MG/DL — HIGH (ref 70–99)
GLUCOSE BLDC GLUCOMTR-MCNC: 201 MG/DL — HIGH (ref 70–99)
GLUCOSE BLDC GLUCOMTR-MCNC: 215 MG/DL — HIGH (ref 70–99)
GLUCOSE BLDC GLUCOMTR-MCNC: 219 MG/DL — HIGH (ref 70–99)
GLUCOSE BLDC GLUCOMTR-MCNC: 297 MG/DL — HIGH (ref 70–99)
GLUCOSE BLDC GLUCOMTR-MCNC: 48 MG/DL — CRITICAL LOW (ref 70–99)
GLUCOSE BLDC GLUCOMTR-MCNC: 53 MG/DL — CRITICAL LOW (ref 70–99)
GLUCOSE BLDC GLUCOMTR-MCNC: 58 MG/DL — LOW (ref 70–99)
GLUCOSE BLDC GLUCOMTR-MCNC: 61 MG/DL — LOW (ref 70–99)
GLUCOSE BLDC GLUCOMTR-MCNC: 71 MG/DL — SIGNIFICANT CHANGE UP (ref 70–99)
GLUCOSE BLDC GLUCOMTR-MCNC: 78 MG/DL — SIGNIFICANT CHANGE UP (ref 70–99)
GLUCOSE BLDC GLUCOMTR-MCNC: 81 MG/DL — SIGNIFICANT CHANGE UP (ref 70–99)
GLUCOSE BLDC GLUCOMTR-MCNC: 82 MG/DL — SIGNIFICANT CHANGE UP (ref 70–99)
GLUCOSE BLDC GLUCOMTR-MCNC: 87 MG/DL — SIGNIFICANT CHANGE UP (ref 70–99)
GLUCOSE SERPL-MCNC: 205 MG/DL — HIGH (ref 70–99)
HCT VFR BLD CALC: 29.3 % — LOW (ref 34.5–45)
HGB BLD-MCNC: 9.4 G/DL — LOW (ref 11.5–15.5)
IMM GRANULOCYTES NFR BLD AUTO: 0.9 % — SIGNIFICANT CHANGE UP (ref 0–1.5)
INR BLD: 0.9 RATIO — SIGNIFICANT CHANGE UP (ref 0.88–1.16)
LDH SERPL L TO P-CCNC: 224 U/L — SIGNIFICANT CHANGE UP (ref 50–242)
LYMPHOCYTES # BLD AUTO: 1.44 K/UL — SIGNIFICANT CHANGE UP (ref 1–3.3)
LYMPHOCYTES # BLD AUTO: 7.1 % — LOW (ref 13–44)
MAGNESIUM SERPL-MCNC: 4.9 MG/DL — HIGH (ref 1.6–2.6)
MAGNESIUM SERPL-MCNC: 5.8 MG/DL — HIGH (ref 1.6–2.6)
MAGNESIUM SERPL-MCNC: 7.3 MG/DL — CRITICAL HIGH (ref 1.6–2.6)
MCHC RBC-ENTMCNC: 28.7 PG — SIGNIFICANT CHANGE UP (ref 27–34)
MCHC RBC-ENTMCNC: 32.1 GM/DL — SIGNIFICANT CHANGE UP (ref 32–36)
MCV RBC AUTO: 89.6 FL — SIGNIFICANT CHANGE UP (ref 80–100)
MONOCYTES # BLD AUTO: 1.12 K/UL — HIGH (ref 0–0.9)
MONOCYTES NFR BLD AUTO: 5.5 % — SIGNIFICANT CHANGE UP (ref 2–14)
NEUTROPHILS # BLD AUTO: 17.43 K/UL — HIGH (ref 1.8–7.4)
NEUTROPHILS NFR BLD AUTO: 86.4 % — HIGH (ref 43–77)
NRBC # BLD: 0 /100 WBCS — SIGNIFICANT CHANGE UP (ref 0–0)
PLATELET # BLD AUTO: 259 K/UL — SIGNIFICANT CHANGE UP (ref 150–400)
POTASSIUM SERPL-MCNC: 4.1 MMOL/L — SIGNIFICANT CHANGE UP (ref 3.5–5.3)
POTASSIUM SERPL-SCNC: 4.1 MMOL/L — SIGNIFICANT CHANGE UP (ref 3.5–5.3)
PROT SERPL-MCNC: 5.1 G/DL — LOW (ref 6–8.3)
PROTHROM AB SERPL-ACNC: 10.9 SEC — SIGNIFICANT CHANGE UP (ref 10.6–13.6)
RBC # BLD: 3.27 M/UL — LOW (ref 3.8–5.2)
RBC # FLD: 16.1 % — HIGH (ref 10.3–14.5)
SODIUM SERPL-SCNC: 136 MMOL/L — SIGNIFICANT CHANGE UP (ref 135–145)
URATE SERPL-MCNC: 5.1 MG/DL — SIGNIFICANT CHANGE UP (ref 2.5–7)
WBC # BLD: 20.2 K/UL — HIGH (ref 3.8–10.5)
WBC # FLD AUTO: 20.2 K/UL — HIGH (ref 3.8–10.5)

## 2021-10-28 PROCEDURE — 99232 SBSQ HOSP IP/OBS MODERATE 35: CPT

## 2021-10-28 RX ORDER — MAGNESIUM SULFATE 500 MG/ML
2 VIAL (ML) INJECTION
Qty: 40 | Refills: 0 | Status: DISCONTINUED | OUTPATIENT
Start: 2021-10-28 | End: 2021-10-28

## 2021-10-28 RX ORDER — NIFEDIPINE 30 MG
10 TABLET, EXTENDED RELEASE 24 HR ORAL ONCE
Refills: 0 | Status: DISCONTINUED | OUTPATIENT
Start: 2021-10-28 | End: 2021-10-28

## 2021-10-28 RX ORDER — ACETAMINOPHEN 500 MG
650 TABLET ORAL EVERY 6 HOURS
Refills: 0 | Status: DISCONTINUED | OUTPATIENT
Start: 2021-10-28 | End: 2021-10-30

## 2021-10-28 RX ORDER — MAGNESIUM SULFATE 500 MG/ML
1.5 VIAL (ML) INJECTION
Qty: 40 | Refills: 0 | Status: DISCONTINUED | OUTPATIENT
Start: 2021-10-28 | End: 2021-10-30

## 2021-10-28 RX ORDER — IBUPROFEN 200 MG
600 TABLET ORAL EVERY 6 HOURS
Refills: 0 | Status: DISCONTINUED | OUTPATIENT
Start: 2021-10-28 | End: 2021-10-28

## 2021-10-28 RX ORDER — ACETAMINOPHEN 500 MG
1000 TABLET ORAL ONCE
Refills: 0 | Status: COMPLETED | OUTPATIENT
Start: 2021-10-28 | End: 2021-10-28

## 2021-10-28 RX ORDER — SODIUM CHLORIDE 9 MG/ML
1000 INJECTION, SOLUTION INTRAVENOUS
Refills: 0 | Status: DISCONTINUED | OUTPATIENT
Start: 2021-10-28 | End: 2021-10-30

## 2021-10-28 RX ORDER — LABETALOL HCL 100 MG
20 TABLET ORAL ONCE
Refills: 0 | Status: COMPLETED | OUTPATIENT
Start: 2021-10-28 | End: 2021-10-28

## 2021-10-28 RX ORDER — IBUPROFEN 200 MG
600 TABLET ORAL EVERY 6 HOURS
Refills: 0 | Status: DISCONTINUED | OUTPATIENT
Start: 2021-10-28 | End: 2021-10-30

## 2021-10-28 RX ORDER — MAGNESIUM SULFATE 500 MG/ML
4 VIAL (ML) INJECTION ONCE
Refills: 0 | Status: COMPLETED | OUTPATIENT
Start: 2021-10-28 | End: 2021-10-28

## 2021-10-28 RX ORDER — NIFEDIPINE 30 MG
30 TABLET, EXTENDED RELEASE 24 HR ORAL DAILY
Refills: 0 | Status: DISCONTINUED | OUTPATIENT
Start: 2021-10-28 | End: 2021-10-30

## 2021-10-28 RX ORDER — SODIUM CHLORIDE 9 MG/ML
1000 INJECTION INTRAMUSCULAR; INTRAVENOUS; SUBCUTANEOUS
Refills: 0 | Status: DISCONTINUED | OUTPATIENT
Start: 2021-10-28 | End: 2021-10-30

## 2021-10-28 RX ORDER — OXYTOCIN 10 UNIT/ML
10 VIAL (ML) INJECTION ONCE
Refills: 0 | Status: DISCONTINUED | OUTPATIENT
Start: 2021-10-28 | End: 2021-10-30

## 2021-10-28 RX ADMIN — INSULIN HUMAN 0.5 UNIT(S)/HR: 100 INJECTION, SOLUTION SUBCUTANEOUS at 01:34

## 2021-10-28 RX ADMIN — OXYCODONE HYDROCHLORIDE 5 MILLIGRAM(S): 5 TABLET ORAL at 18:58

## 2021-10-28 RX ADMIN — Medication 1000 MILLIGRAM(S): at 12:43

## 2021-10-28 RX ADMIN — Medication 400 MILLIGRAM(S): at 12:13

## 2021-10-28 RX ADMIN — Medication 300 GRAM(S): at 00:58

## 2021-10-28 RX ADMIN — Medication 400 MILLIGRAM(S): at 03:16

## 2021-10-28 RX ADMIN — SODIUM CHLORIDE 50 MILLILITER(S): 9 INJECTION INTRAMUSCULAR; INTRAVENOUS; SUBCUTANEOUS at 10:11

## 2021-10-28 RX ADMIN — Medication 1000 MILLIUNIT(S)/MIN: at 01:33

## 2021-10-28 RX ADMIN — Medication 650 MILLIGRAM(S): at 22:00

## 2021-10-28 RX ADMIN — Medication 30 MILLIGRAM(S): at 01:15

## 2021-10-28 RX ADMIN — Medication 600 MILLIGRAM(S): at 08:24

## 2021-10-28 RX ADMIN — Medication 50 GM/HR: at 01:18

## 2021-10-28 RX ADMIN — SODIUM CHLORIDE 3 MILLILITER(S): 9 INJECTION INTRAMUSCULAR; INTRAVENOUS; SUBCUTANEOUS at 20:00

## 2021-10-28 RX ADMIN — SODIUM CHLORIDE 50 MILLILITER(S): 9 INJECTION, SOLUTION INTRAVENOUS at 15:32

## 2021-10-28 RX ADMIN — OXYCODONE HYDROCHLORIDE 5 MILLIGRAM(S): 5 TABLET ORAL at 18:21

## 2021-10-28 RX ADMIN — SODIUM CHLORIDE 3 MILLILITER(S): 9 INJECTION INTRAMUSCULAR; INTRAVENOUS; SUBCUTANEOUS at 15:20

## 2021-10-28 RX ADMIN — Medication 30 MILLIGRAM(S): at 08:21

## 2021-10-28 RX ADMIN — Medication 650 MILLIGRAM(S): at 21:24

## 2021-10-28 RX ADMIN — OXYCODONE HYDROCHLORIDE 5 MILLIGRAM(S): 5 TABLET ORAL at 04:32

## 2021-10-28 RX ADMIN — Medication 20 MILLIGRAM(S): at 00:50

## 2021-10-28 NOTE — OB RN DELIVERY SUMMARY - NSPROCMANEUVERSA_OBGYN_ALL_OB
Suprapubic pressure/Diego (Legs flexed back) Suprapubic pressure/Diego (Legs flexed back)/[place]

## 2021-10-28 NOTE — PROVIDER CONTACT NOTE (CRITICAL VALUE NOTIFICATION) - ASSESSMENT
pt complained of dizziness and lightheadedness while laying, no blurry vision and epigastric pain, or headache. brisk reflex

## 2021-10-28 NOTE — OB PROVIDER DELIVERY SUMMARY - NSPROVIDERDELIVERYNOTE_OBGYN_ALL_OB_FT
Patient pushed effectively, noted to have terminal meconium and shoulder dystocia at delivery. Staff assist called, peds, extra nursing and  Dr. Bird in room. Pt instructed to stop pushing, pt placed in Diego position with suprapubic pressure with no relief of shoulder dystocia. Rotational maneuvers performed with relief of dystocia, total time from head to delivery of body 3 minutes (head 22:45, body 22:48). Cord clamped and cut immediately and baby handed to peds for assessment. Gases collected. Placenta removed with gentle cord traction, will send to path. Fundal passage performed, noted to have JAMAAL atony, given pitocin IM x 1 and cytotec 1000mcg [place]. EBL 400cc. 2nd deg laceration repaired in usual fashion. Apgars 5, 9. 3809g, boy.    Discussed with pt, pt's partner and mother findings of shoulder dystocia. Discussed recommendations of CS in the future given increased risk of shoulder dystocia in the future. All questions answered.    Aly MURILLO

## 2021-10-28 NOTE — PROGRESS NOTE ADULT - ASSESSMENT
A/P: 20yo PPD#1 s/p  c/b shoulder dystocia, T1DM and sPEC. Patient is currently on Mg for seizure PPx and Insulin gtt. Patient is stable and doing well post-operatively.        #T1DM  - s/p omini insulin pump; discontinued during induction 2/2 episodes of hypoglycemia  - currently on Insulin gtt (10/27-)  - continue 1hr FS at this time  - patient to eat and transition back to insulin pump (postpartum settings provided to patient and mother by Endocrinologist)  - Endocrine following; appreciate recs    #sPEC  - Mg for seizure PPx (10/28-)  s/p Lab 20 IVP postpartum  - start Procardia 30XL in AM  - monitor BPs  - HELLP labs wnl; f/u AM HELLP labs    #Postpartum  - Regular diet  - OOB, encourage ambulation  - Continue motrin, tylenol, oxycodone PRN for pain control.      Asiya Howell, PGY-3

## 2021-10-28 NOTE — PROGRESS NOTE ADULT - ASSESSMENT
Pt is a 20yo  @37 weeks presenting for labor and delivery.  Now postpartum.     1. Type 1 DM  Patient is currently NPO, glucose trending in 70s.    Met with patient, adjust insulin pump to decreased of basal rate by 30% for 12 hour, ending around 11:30 pm.   Basal Rates:   12 am: adjusted from 1.3 units to 1.5 units/hour.     Insulin to carb ratio: 12  Correction factor: 55    BGT: 120  IOB: 4 hours    I discussed with patient that if her post-prandial glucose is elevated, consider changing ICR from 1:12 to 1:10, she expressed understanding  Pump site changed: 10/27/2021. Due for change 10/30/2021.       Please contact endocrine team if any hyper or hypoglycemia (>70mg/ld and greater than 250mg/dl)    Raimundo Lind MD  Pager [phone], short range [phone]  Please provide 10 digit call back number if any questions.    For after hours or weekends please call [phone] or page fellow on call.     Pt is a 22yo  @37 weeks presenting for labor and delivery.  Now postpartum.     1. Type 1 DM  Patient is currently NPO, glucose trending in 70s.    Met with patient, adjust insulin pump to decreased of basal rate by 30% for 12 hour, ending around 11:30 pm.   Basal Rates:   12 am: adjusted from 1.3 units to 1.5 units/hour.     Insulin to carb ratio: 12  Correction factor: 55    BGT: 120  IOB: 4 hours    I discussed with patient that if her post-prandial glucose is elevated, consider changing ICR from 1:12 to 1:10, she expressed understanding  Pump site changed: 10/27/2021. Due for change 10/30/2021.       Please contact endocrine team if any hyper or hypoglycemia (>70mg/ld and greater than 250mg/dl)    Raimundo Lind MD  Pager [phone], short range [phone]  Please provide 10 digit call back number if any questions.    For after hours or weekends please call [phone] or page fellow on call.        Note addendum:  Team called about hypoglycemia.   Patient has not been eating. I also increased the basal rate to 1.5 from 1.3 unit/hr this morning.   She has appetite now and eating pancake.   Recommend to continue with current settings now.   Advised mom to do Temp basal decrease of 30% if she's having more hypoglycemia overnight.

## 2021-10-28 NOTE — DISCUSSION/SUMMARY
[FreeTextEntry1] : 22yo  s/p  on 10/27 after IOL for poorly controlled T1DM at 37+4. Ruled in for PEC w SF intrapartum, started on Mg. Shoulder dystocia at delivery, 3 min head to shoulder interval, resolved with rotational maneuvers. Lower uterine segment atony, pit IM and cytotec 1000mcg given. EBL 400cc. Advised for  section in the future.

## 2021-10-28 NOTE — CHART NOTE - NSCHARTNOTEFT_GEN_A_CORE
OB attg note    Patient started pushing at 945pm and pushed effectively. Noted to have terminal meconium and shoulder dystocia at time of delivery. Staff assist called, peds, extra nursing and  Dr. Bird in room. Patient instructed to stop pushing, Diego and suprapubic pressure applied. Attempted delivery of posterior arm but unable to do so. Shoulder dystocia relieved with rotational maneuvers. Head to body delivery interval 3 minutes. Cord clamped and cut, baby handed to peds for assessment. Apgars 5, 9. Fetal weight 3908g. Discussed with patient and mother as well as patient's partner increased risk of shoulder dystocia with future pregnancies, would advise  section. All questions answered.    Aly MURILLO

## 2021-10-28 NOTE — PROVIDER CONTACT NOTE (CRITICAL VALUE NOTIFICATION) - PERSON GIVING RESULT:
PATIENT NAME: BESSIE ELAINE   MEDICAL RECORD NUMBER: [id number]   ACCOUNT NUMBER: [id number]   ADMIT DATE: 02/15/2017   DISCHARGE DATE: 02/16/2017   ATTENDING PHYSICIAN: CHIKA PORRAS M.D.   ROOM #: [id number]   SERVICE: OBD                                           CONSULTATION      CONSULTING PHYSICIAN: CHELSEY NEELY M.D. GASTROENTEROLOGY   DATE OF CONSULT: 02/16/2017         REFERRING PHYSICIAN:  Chika Porras M.D.      REASON FOR GI EVALUATION:  Nausea, vomiting, cholecystitis, abdominal pain.      HISTORY OF PRESENT ILLNESS:  The patient is a 69-year-old, female patient, who   came to the emergency room with complaints of nausea and vomiting and some   epigastric discomfort, which started about 3 or 4 days ago.  She had similar   issues a couple of weeks ago and was diagnosed with gallstones, but symptoms   resolved, so nothing was done at that time and she was sent home.  Yesterday,   when she came to the ER, a CT scan of the abdomen and an ultrasound was done,   which confirmed the patient did have gallstones but no bile duct dilatation was   seen and no abnormal labs were seen.  The patient was taken to the OR for   laparoscopic cholecystectomy.  Currently, postoperatively, she is doing much   better.  She has only some mild postoperative discomfort, but no nausea or   vomiting.  No dry heaves and no significant abdominal pain.  No fever.  Prior to   admission, she had some low-grade fevers.      PAST MEDICAL HISTORY:  Significant for breast cancer few years ago, history of   hypertension, hyperlipidemia and arthritis.  She also had hysterectomy in the   past, lumpectomy for the breast cancer, C-sections, hip repair and knee   surgeries in the past.  She apparently had a colonoscopy done in the past, which   was unremarkable.      SOCIAL HISTORY:  No smoking.  Drinks alcohol occasionally socially.      FAMILY HISTORY:  No family history of cancer.      ALLERGIES:  SHE IS ALLERGIC TO AMITRIPTYLINE,  AMLODIPINE, PENICILLIN OR   AMOXICILLIN, AZITHROMYCIN, BACTRIM, DEMEROL, METFORMIN, TRAMADOL, VANCOMYCIN.   MULTIPLE DRUG ALLERGIES AS PER THE CHART.      MEDICATIONS:  The patient's medication list were reviewed as per the MAR.      REVIEW OF SYSTEMS:  The patient appears quite comfortable at this time.   General/constitutional:  Feeling much better after the gallbladder surgery.  No   further nausea and vomiting.  HEENT:  No blurry vision.  Cardiovascular:  No   palpitations or chest pain.  Pulmonary:  No shortness of breath at this time.   Gastrointestinal:  The patient is post gallbladder surgery.  Minimal soreness   and discomfort at the surgery site, but no vomiting, no nausea.   Musculoskeletal:  Generalized weakness.  Dermatologic:  No skin rash.   Endocrine:  No thyroid problem.  No diabetes.  Psych:  No depression or anxiety.   Neurologic:  No loss of consciousness or seizures.      PHYSICAL EXAMINATION:  VITAL SIGNS:  The patient is afebrile.  Her temperature   is 37, blood pressure 154/80.  Heart rate is 87, respirations 16.   HEENT:  The pupils are equal reactive to light.  Extraocular muscles are intact.   Sclera nonicteric.  Conjunctiva pink.   NECK:  Supple.  No JVD.  No adenopathy.  No carotid bruits.   PULMONARY:  Lungs are clear.   CARDIOVASCULAR:  Exam shows S1, S2 is normal.  No S3, S4.   GASTROINTESTINAL:  Abdomen is obese, soft.  Mild discomfort in the abdomen at   the surgery site from laparoscopic cholecystectomy yesterday.   SKIN:  No rash or ulcers.   ENDOCRINE:  No thyroid enlargement.   NEUROLOGIC:  The patient is awake, alert, and oriented x3.  No focal deficit.      LABORATORY DATA:  Labs were reviewed, which shows BUN and creatinine is 13 and   0.91.  LFTs are normal.  INR is 1.0.  WBC count is 12.3, hemoglobin and   hematocrit 13.3 and 40.5.  Lipase on admission was mildly elevated at 503, today   it has come back to 163 cm.  The patient underwent an ultrasound of the   gallbladder  and liver yesterday, which showed 1.7 cm gallstone.  No gallbladder   wall thickening or pericholecystic fluid seen.  No intra or extrahepatic biliary   dilatation.  Common bile duct measured 5 to 6 mm in size.  The liver is   echogenic to diffuse fatty infiltration.  No focal hepatic mass seen.   Pancreatic tail obscured by overlying bowel gas.  The patient underwent a CT   scan of the abdomen and pelvis, which showed chronic diverticulosis without   evidence of diverticulitis, cholelithiasis bilateral renal cysts etc.      ASSESSMENT:  The patient with nausea and vomiting likely secondary to chronic   cholecystitis, found to have gallstones.  The patient underwent gallbladder   surgery yesterday doing much better today.      RECOMMENDATIONS:  Continue to advance diet as per surgery.  From GI standpoint,   the patient can be discharged.  Follow up as outpatient in 2 to 3 weeks of time.   If the symptoms of nausea and vomiting or abdominal pain recurs, may consider   further evaluation with an endoscopy.  This was explained and discussed with the   patient, also discussed with the nurse.      Thank you, Dr. Porras for allowing me to participate in the care of this patient.            _________________________________   Pedro Joseph M.D. GASTROENTEROLOGY         CC:   SIMRAN Richmond M.D. Gurbax Saini, M.D. JA/MEDQ   DD: 02/16/2017  DT: 02/16/2017   TD: 12:58  TT: 14:28   [id number]         core lab, Kamari

## 2021-10-28 NOTE — OB POSTPARTUM EVENT NOTE - NS_EVENTSUMMARY1_OBGYN_ALL_OB_FT
Patient has had unstable blood glucose readings since admission. Patient was on her own insulin pump for a few hours during labor, following the nurses starting an insulin drip as per third year due to continuous unstable sugars. After delivery, patient was to continue on the insulin drip until sugars became stable. At 02:50am, patient's blood glucose was 131. As per MD Howell, patient was to then switch to her insulin pump and ordered to discontinue the insulin drip. Patient's finger stick 1-hour post administering insulin pump was 161. I advised the patient to adjust her insulin accordingly. MD Howell made aware. Patient's finger stick 2-hours post administering insulin pump was 297. Patient claims that she did adjust her insulin prior to this finger stick. MD Howell made aware of situation and will discuss with team for updated plan of care.

## 2021-10-28 NOTE — OB RN DELIVERY SUMMARY - NS_SEPSISRSKCALC_OBGYN_ALL_OB_FT
EOS calculated successfully. EOS Risk Factor: 0.5/1000 live births ([facility] national incidence); GA=37w4d; Temp=99.14; ROM=15.867; GBS='Positive'; Antibiotics='Broad spectrum antibiotics > 4 hrs prior to birth'

## 2021-10-28 NOTE — OB RN DELIVERY SUMMARY - NSSELHIDDEN_OBGYN_ALL_OB_FT
[NS_DeliveryAttending1_OBGYN_ALL_OB_FT:[email or web address]=],[NS_DeliveryAssist1_OBGYN_ALL_OB_FT:[id number]],[NS_DeliveryRN_OBGYN_ALL_OB_FT:[id number]=]

## 2021-10-28 NOTE — OB PROVIDER DELIVERY SUMMARY - NSSELHIDDEN_OBGYN_ALL_OB_FT
Subjective:     Patient ID:Gaby Vanessa is a 36 y.o. female. HPI   ADD/ADHD:  Current treatment: Adderall XR- 30, which has been effective. Residual symptoms: none. Medication side effects: None. Patient denies None. No Known Allergies    Current Outpatient Medications   Medication Sig Dispense Refill    amphetamine-dextroamphetamine (ADDERALL XR) 30 MG extended release capsule One BID 60 capsule 0    vitamin D-3 (CHOLECALCIFEROL) 5000 units TABS Take 1 tablet by mouth daily 30 tablet 0    naltrexone-bupropion (CONTRAVE) 8-90 MG per extended release tablet 1 tab 1 week, 2 tabs second week, 3 tabs 3rd week, 4 tabs 4th week and later 120 tablet 2    dicyclomine (BENTYL) 10 MG capsule Take 1 capsule by mouth 4 times daily 120 capsule 3    escitalopram (LEXAPRO) 20 MG tablet Take 1.5 tablets by mouth daily 45 tablet 3    NP THYROID 120 MG tablet Take 1 tablet by mouth daily 30 tablet 3    Nutritional Supplements (VITAMIN D BOOSTER PO) Take by mouth       No current facility-administered medications for this visit. Past Medical History:   Diagnosis Date    ADHD (attention deficit hyperactivity disorder) 2007    Anxiety     Headache(784.0)     HPV (human papillomavirus) vaccine 2006    Migraine     PMS (premenstrual syndrome) 3/11/2011       No past surgical history on file.     Social History     Socioeconomic History    Marital status:      Spouse name: Not on file    Number of children: Not on file    Years of education: Not on file    Highest education level: Not on file   Occupational History    Not on file   Social Needs    Financial resource strain: Not on file    Food insecurity:     Worry: Not on file     Inability: Not on file    Transportation needs:     Medical: Not on file     Non-medical: Not on file   Tobacco Use    Smoking status: Never Smoker    Smokeless tobacco: Never Used   Substance and Sexual Activity    Alcohol use: Yes     Comment: occasionally    normal. No stridor. No respiratory distress. She has no wheezes. She has no rales. She exhibits no tenderness. Abdominal: Soft. Bowel sounds are normal. She exhibits no distension and no mass. There is no tenderness. Musculoskeletal: She exhibits no edema or tenderness. Lymphadenopathy:     She has no cervical adenopathy. Neurological: She is alert and oriented to person, place, and time. She displays normal reflexes. No cranial nerve deficit. She exhibits normal muscle tone. Coordination normal.   Skin: Skin is warm and dry. No rash noted. No erythema. No pallor. Psychiatric: She has a normal mood and affect. Her behavior is normal. Judgment and thought content normal.   Vitals reviewed. Assessment and Plan:     ADHD (attention deficit hyperactivity disorder)  Stable with current plan--    Controlled Substance Monitoring:    Acute and Chronic Pain Monitoring:   RX Monitoring 6/14/2019   Attestation -   Periodic Controlled Substance Monitoring Possible medication side effects, risk of tolerance/dependence & alternative treatments discussed. ;No signs of potential drug abuse or diversion identified. ;Assessed functional status. Chronic Pain > 50 MEDD Obtained or confirmed \"Consent for Opioid Use\" on file. [NS_DeliveryAttending1_OBGYN_ALL_OB_FT:[email or web address]=],[NS_DeliveryAssist1_OBGYN_ALL_OB_FT:[id number]=],[NS_DeliveryRN_OBGYN_ALL_OB_FT:[id number]=],[NS_DeliveryAttending2_OBGYN_ALL_OB_FT:[id number]]

## 2021-10-28 NOTE — OB PROVIDER DELIVERY SUMMARY - DELIVERY COMPLICATIONS; SHOULDER DYSTOCIA
Diego and suprapubic pressure, dystocia relieved with rotational maneuvers. Time from head to delivery of body was 3 minutes

## 2021-10-29 ENCOUNTER — APPOINTMENT (OUTPATIENT)
Dept: OBGYN | Facility: CLINIC | Age: 21
End: 2021-10-29

## 2021-10-29 LAB
ALBUMIN SERPL ELPH-MCNC: 2.4 G/DL — LOW (ref 3.3–5)
ALP SERPL-CCNC: 234 U/L — HIGH (ref 40–120)
ALT FLD-CCNC: 20 U/L — SIGNIFICANT CHANGE UP (ref 10–45)
ANION GAP SERPL CALC-SCNC: 11 MMOL/L — SIGNIFICANT CHANGE UP (ref 5–17)
APTT BLD: 26.4 SEC — LOW (ref 27.5–35.5)
AST SERPL-CCNC: 23 U/L — SIGNIFICANT CHANGE UP (ref 10–40)
BASOPHILS # BLD AUTO: 0.03 K/UL — SIGNIFICANT CHANGE UP (ref 0–0.2)
BASOPHILS NFR BLD AUTO: 0.2 % — SIGNIFICANT CHANGE UP (ref 0–2)
BILIRUB SERPL-MCNC: 0.1 MG/DL — LOW (ref 0.2–1.2)
BUN SERPL-MCNC: 6 MG/DL — LOW (ref 7–23)
CALCIUM SERPL-MCNC: 8.4 MG/DL — SIGNIFICANT CHANGE UP (ref 8.4–10.5)
CHLORIDE SERPL-SCNC: 107 MMOL/L — SIGNIFICANT CHANGE UP (ref 96–108)
CO2 SERPL-SCNC: 20 MMOL/L — LOW (ref 22–31)
CREAT SERPL-MCNC: 0.73 MG/DL — SIGNIFICANT CHANGE UP (ref 0.5–1.3)
EOSINOPHIL # BLD AUTO: 0.14 K/UL — SIGNIFICANT CHANGE UP (ref 0–0.5)
EOSINOPHIL NFR BLD AUTO: 1 % — SIGNIFICANT CHANGE UP (ref 0–6)
FIBRINOGEN PPP-MCNC: 696 MG/DL — HIGH (ref 290–520)
GLUCOSE BLDC GLUCOMTR-MCNC: 102 MG/DL — HIGH (ref 70–99)
GLUCOSE BLDC GLUCOMTR-MCNC: 116 MG/DL — HIGH (ref 70–99)
GLUCOSE BLDC GLUCOMTR-MCNC: 141 MG/DL — HIGH (ref 70–99)
GLUCOSE BLDC GLUCOMTR-MCNC: 163 MG/DL — HIGH (ref 70–99)
GLUCOSE BLDC GLUCOMTR-MCNC: 168 MG/DL — HIGH (ref 70–99)
GLUCOSE BLDC GLUCOMTR-MCNC: 44 MG/DL — CRITICAL LOW (ref 70–99)
GLUCOSE BLDC GLUCOMTR-MCNC: 44 MG/DL — CRITICAL LOW (ref 70–99)
GLUCOSE BLDC GLUCOMTR-MCNC: 46 MG/DL — CRITICAL LOW (ref 70–99)
GLUCOSE BLDC GLUCOMTR-MCNC: 58 MG/DL — LOW (ref 70–99)
GLUCOSE BLDC GLUCOMTR-MCNC: 73 MG/DL — SIGNIFICANT CHANGE UP (ref 70–99)
GLUCOSE BLDC GLUCOMTR-MCNC: 80 MG/DL — SIGNIFICANT CHANGE UP (ref 70–99)
GLUCOSE BLDC GLUCOMTR-MCNC: 80 MG/DL — SIGNIFICANT CHANGE UP (ref 70–99)
GLUCOSE BLDC GLUCOMTR-MCNC: 81 MG/DL — SIGNIFICANT CHANGE UP (ref 70–99)
GLUCOSE BLDC GLUCOMTR-MCNC: 81 MG/DL — SIGNIFICANT CHANGE UP (ref 70–99)
GLUCOSE BLDC GLUCOMTR-MCNC: 97 MG/DL — SIGNIFICANT CHANGE UP (ref 70–99)
GLUCOSE SERPL-MCNC: 48 MG/DL — CRITICAL LOW (ref 70–99)
HCT VFR BLD CALC: 26.9 % — LOW (ref 34.5–45)
HGB BLD-MCNC: 8.4 G/DL — LOW (ref 11.5–15.5)
IMM GRANULOCYTES NFR BLD AUTO: 0.5 % — SIGNIFICANT CHANGE UP (ref 0–1.5)
INR BLD: 0.83 RATIO — LOW (ref 0.88–1.16)
LDH SERPL L TO P-CCNC: 219 U/L — SIGNIFICANT CHANGE UP (ref 50–242)
LYMPHOCYTES # BLD AUTO: 2.83 K/UL — SIGNIFICANT CHANGE UP (ref 1–3.3)
LYMPHOCYTES # BLD AUTO: 20.8 % — SIGNIFICANT CHANGE UP (ref 13–44)
MCHC RBC-ENTMCNC: 28.2 PG — SIGNIFICANT CHANGE UP (ref 27–34)
MCHC RBC-ENTMCNC: 31.2 GM/DL — LOW (ref 32–36)
MCV RBC AUTO: 90.3 FL — SIGNIFICANT CHANGE UP (ref 80–100)
MONOCYTES # BLD AUTO: 0.56 K/UL — SIGNIFICANT CHANGE UP (ref 0–0.9)
MONOCYTES NFR BLD AUTO: 4.1 % — SIGNIFICANT CHANGE UP (ref 2–14)
NEUTROPHILS # BLD AUTO: 9.96 K/UL — HIGH (ref 1.8–7.4)
NEUTROPHILS NFR BLD AUTO: 73.4 % — SIGNIFICANT CHANGE UP (ref 43–77)
NRBC # BLD: 0 /100 WBCS — SIGNIFICANT CHANGE UP (ref 0–0)
PLATELET # BLD AUTO: 247 K/UL — SIGNIFICANT CHANGE UP (ref 150–400)
POTASSIUM SERPL-MCNC: 4 MMOL/L — SIGNIFICANT CHANGE UP (ref 3.5–5.3)
POTASSIUM SERPL-SCNC: 4 MMOL/L — SIGNIFICANT CHANGE UP (ref 3.5–5.3)
PROT SERPL-MCNC: 5 G/DL — LOW (ref 6–8.3)
PROTHROM AB SERPL-ACNC: 10.1 SEC — LOW (ref 10.6–13.6)
RBC # BLD: 2.98 M/UL — LOW (ref 3.8–5.2)
RBC # FLD: 16.4 % — HIGH (ref 10.3–14.5)
SODIUM SERPL-SCNC: 138 MMOL/L — SIGNIFICANT CHANGE UP (ref 135–145)
URATE SERPL-MCNC: 5.4 MG/DL — SIGNIFICANT CHANGE UP (ref 2.5–7)
WBC # BLD: 13.59 K/UL — HIGH (ref 3.8–10.5)
WBC # FLD AUTO: 13.59 K/UL — HIGH (ref 3.8–10.5)

## 2021-10-29 PROCEDURE — 72170 X-RAY EXAM OF PELVIS: CPT | Mod: 26

## 2021-10-29 PROCEDURE — 99233 SBSQ HOSP IP/OBS HIGH 50: CPT

## 2021-10-29 RX ORDER — INSULIN LISPRO 100/ML
1 VIAL (ML) SUBCUTANEOUS
Refills: 0 | Status: DISCONTINUED | OUTPATIENT
Start: 2021-10-29 | End: 2021-10-29

## 2021-10-29 RX ORDER — INSULIN LISPRO 100/ML
1 VIAL (ML) SUBCUTANEOUS
Refills: 0 | Status: DISCONTINUED | OUTPATIENT
Start: 2021-10-29 | End: 2021-10-30

## 2021-10-29 RX ADMIN — Medication 30 MILLIGRAM(S): at 08:39

## 2021-10-29 RX ADMIN — OXYCODONE HYDROCHLORIDE 5 MILLIGRAM(S): 5 TABLET ORAL at 07:02

## 2021-10-29 RX ADMIN — SODIUM CHLORIDE 3 MILLILITER(S): 9 INJECTION INTRAMUSCULAR; INTRAVENOUS; SUBCUTANEOUS at 13:22

## 2021-10-29 NOTE — PROGRESS NOTE ADULT - PROBLEM SELECTOR PLAN 1
-Test BG ac and hs and 2am anf PRN  -Please document amount of carbs and bolus insulin doses in flowsheet.   -Start CGM from home > Starting Pham CGM until pt places it with her own Dexcom  Disposition:  -C/w insulin pump at home  -F/u with Dr Lind as out pt. -Test BG ac and hs and 2am anf PRN  -Please document amount of carbs and bolus insulin doses in flowsheet.   -Start CGM from home > Starting Pham CGM until pt places it with her own Dexcom  Disposition:  -C/w insulin pump at home  -please write Rx for Baqsimi nasal powder 3mg for severe hypoglycemia (use as directed)  -F/u with Dr Lind as out pt.

## 2021-10-29 NOTE — PROVIDER CONTACT NOTE (OTHER) - ASSESSMENT
bedtime FS 61. patient denies any symptoms of hypoglycemia at this time. as per protocol, patient to drink juice and recheck FS in 15 minutes until glucose 100 or >.
pt was denies symptoms except for shakiness.
fasting FS 58, patient remains asymptomatic at this time. insulin basal rate decreased 30% overnight. Patient given orange juice now.

## 2021-10-29 NOTE — PROGRESS NOTE ADULT - ASSESSMENT
A/P: 22yo PPD#2 s/p  c/b shoulder dystocia, T1DM and sPEC. Patient is s/p Mg for seizure PPx and Insulin gtt, sugars managed on Omini insulin pump. Patient with episode of hypoglycemia overnight requiring decrease in basal rate by 30% as per Endocrine recommendations Patient is otherwise stable and doing well post-operatively.      #T1DM  -s/p insulin gtt  - currently on Omini insulin pump  - monitor FS; episode of hypoglycemia overnight  - Endocrine following; appreciate recs    #sPEC  - s/p Mg for seizure PPx (10/28-)  - s/p Lab 20 IVP postpartum  - c/w Procardia 30XL  - monitor BPs  - HELLP labs wnl    #Postpartum  - Regular diet  - OOB, encourage ambulation  - Continue motrin, tylenol, oxycodone PRN for pain control.      Asiya Howell, PGY-3

## 2021-10-29 NOTE — PROVIDER CONTACT NOTE (OTHER) - BACKGROUND
day 1 S/P Mag for PEC, type 1 DM with an insulin pump
 day 1 being treated with mag sulfate for elevated BPs. patient with hx of type 1 DM on novolog pump.
 day 2 s/p mag sulfate with hx of type 1 DM on insulin pump.

## 2021-10-29 NOTE — PROGRESS NOTE ADULT - ATTENDING COMMENTS
Pt reports left hip pain and difficulty ambulating-  observed pt get out of bed-  able to bear wgt and move bith legs to walk but has pain in left hip  BPs well controlled  128/85- 135/83- 130/85  Appreciate endo f/u   BPs well controlled on procardia XL 30mg  Will order xray of L hip

## 2021-10-29 NOTE — PROGRESS NOTE ADULT - ASSESSMENT
20yo F w/h/o T1DM on insulin pump Omnipod PTA. DM c/b neuropathy. Also  @37 weeks presenting for labor and delivery > now postpartum. Tolerating POs with BG levels variable and frequent hypoglycemia unawareness while on insulin pump. BG levels done and followed per hypoglycemia protocol. Suspended insulin pump x1 hours this am since BG levels remained  low while on insulin pump settings as noted above. Changed insulin pump setting to keep BG goal between 100 to 180s and prevent hypoglycemia.  Met with patient and reviewed the following:  -Blood glucose goals: 100s to 150s as out pt (100 to 180s)  -Glucose monitoring frequency: ac and hs. Pt to restart Dexcom asap. However, can't recall where she has the transmitter but mother will look for it at home and bring it to hospital.  -Hypoglycemia prevention, detection and treatment. Pt needs to start CGM asap since she has hypoglycemia unawareness and CGM would alert her for any BG coming down rapidly. Pt instructed to calibrate Dexcom if BGs are not coinciding with POC testing by glucose meter.   -Healthy eating and portion control. Pt able to count carbs  -Insulin(s) action, time of administration and side effects.  -Importance of follow up care. Pt to f/u with Dr Lind as out pt  Pt able to verbalize understanding and agrees with plan of care. Also  agreed to start Pham CGM until able to get her DEXCOM back.

## 2021-10-29 NOTE — PROVIDER CONTACT NOTE (OTHER) - ACTION/TREATMENT ORDERED:
as per order.
MD aware of FS, will continue to monitor and repeat FS q15 minutes until 100 or >.
as per MD, will follow hypoglycemia protocol and MD reports to decrease insulin pump basal rate by 30% as per endocrine recommendations.

## 2021-10-30 ENCOUNTER — TRANSCRIPTION ENCOUNTER (OUTPATIENT)
Age: 21
End: 2021-10-30

## 2021-10-30 VITALS — WEIGHT: 115.08 LBS

## 2021-10-30 LAB
GLUCOSE BLDC GLUCOMTR-MCNC: 118 MG/DL — HIGH (ref 70–99)
GLUCOSE BLDC GLUCOMTR-MCNC: 168 MG/DL — HIGH (ref 70–99)
GLUCOSE BLDC GLUCOMTR-MCNC: 93 MG/DL — SIGNIFICANT CHANGE UP (ref 70–99)

## 2021-10-30 PROCEDURE — 86901 BLOOD TYPING SEROLOGIC RH(D): CPT

## 2021-10-30 PROCEDURE — 85384 FIBRINOGEN ACTIVITY: CPT

## 2021-10-30 PROCEDURE — 86769 SARS-COV-2 COVID-19 ANTIBODY: CPT

## 2021-10-30 PROCEDURE — 59025 FETAL NON-STRESS TEST: CPT

## 2021-10-30 PROCEDURE — 86850 RBC ANTIBODY SCREEN: CPT

## 2021-10-30 PROCEDURE — 86900 BLOOD TYPING SEROLOGIC ABO: CPT

## 2021-10-30 PROCEDURE — 59050 FETAL MONITOR W/REPORT: CPT

## 2021-10-30 PROCEDURE — 80053 COMPREHEN METABOLIC PANEL: CPT

## 2021-10-30 PROCEDURE — 86780 TREPONEMA PALLIDUM: CPT

## 2021-10-30 PROCEDURE — 93005 ELECTROCARDIOGRAM TRACING: CPT

## 2021-10-30 PROCEDURE — 82570 ASSAY OF URINE CREATININE: CPT

## 2021-10-30 PROCEDURE — 85610 PROTHROMBIN TIME: CPT

## 2021-10-30 PROCEDURE — 99233 SBSQ HOSP IP/OBS HIGH 50: CPT

## 2021-10-30 PROCEDURE — 83735 ASSAY OF MAGNESIUM: CPT

## 2021-10-30 PROCEDURE — 85025 COMPLETE CBC W/AUTO DIFF WBC: CPT

## 2021-10-30 PROCEDURE — 81003 URINALYSIS AUTO W/O SCOPE: CPT

## 2021-10-30 PROCEDURE — 84156 ASSAY OF PROTEIN URINE: CPT

## 2021-10-30 PROCEDURE — 85730 THROMBOPLASTIN TIME PARTIAL: CPT

## 2021-10-30 PROCEDURE — 84550 ASSAY OF BLOOD/URIC ACID: CPT

## 2021-10-30 PROCEDURE — 36415 COLL VENOUS BLD VENIPUNCTURE: CPT

## 2021-10-30 PROCEDURE — 82962 GLUCOSE BLOOD TEST: CPT

## 2021-10-30 PROCEDURE — 72170 X-RAY EXAM OF PELVIS: CPT

## 2021-10-30 PROCEDURE — 83615 LACTATE (LD) (LDH) ENZYME: CPT

## 2021-10-30 RX ORDER — NIFEDIPINE 30 MG
1 TABLET, EXTENDED RELEASE 24 HR ORAL
Qty: 35 | Refills: 0
Start: 2021-10-30 | End: 2021-12-03

## 2021-10-30 RX ORDER — NIFEDIPINE 30 MG
1 TABLET, EXTENDED RELEASE 24 HR ORAL
Qty: 30 | Refills: 1
Start: 2021-10-30 | End: 2021-12-28

## 2021-10-30 RX ORDER — ACETAMINOPHEN 500 MG
20.31 TABLET ORAL
Qty: 0 | Refills: 0 | DISCHARGE
Start: 2021-10-30

## 2021-10-30 RX ORDER — IBUPROFEN 200 MG
30 TABLET ORAL
Qty: 0 | Refills: 0 | DISCHARGE
Start: 2021-10-30

## 2021-10-30 RX ADMIN — Medication 30 MILLIGRAM(S): at 07:53

## 2021-10-30 NOTE — DISCHARGE NOTE OB - COMMENTS
Check blood pressure 2 times daily.  Notify md for blood pressures greater than 150/98 and/or s/s of hypertension

## 2021-10-30 NOTE — DISCHARGE NOTE OB - HOSPITAL COURSE
20 yo  that presented at 38 wks for scheduled IOL to T1DM.  Patient had an  complicated with shoulder dystocia.  Patient met criteria for sPEC postpartum and was started on Procardia 30 XL for blood pressures and recevied magnesium sulfate for 24 hours of prophyalxis.  Postpartum she had intermittent episodes of hypoglycemia and was followed closely by endocrinology to adjust pump settings.  On postpartum day 3 her blood pressures were stable, fingersticks were normalized and pain was well controlled.   22 yo  that presented at 37 wks for scheduled IOL to T1DM.  Patient had an  complicated with shoulder dystocia.  Patient met criteria for sPEC postpartum and was started on Procardia 30 XL for blood pressures and recevied magnesium sulfate for 24 hours of prophyalxis.  Postpartum she had intermittent episodes of hypoglycemia and was followed closely by endocrinology to adjust pump settings.  On postpartum day 3 her blood pressures were stable, fingersticks were normalized and pain was well controlled.

## 2021-10-30 NOTE — PROGRESS NOTE ADULT - ATTENDING COMMENTS
OB attg note    22yo  s/p  now ppd3 after IOL for poorly controlled T1DM at 37 weeks. C/b shoulder dystocia relieved with rotational maneuvers. Had hypoglycemic episodes intrapartum and postpartum, seeing endo who have been titrating her insulin pump. C/o L hip discomfort yesterday, pelvic xray prelim read neg, will f/u final read but pt reports that pain is improved and strength is improved as well. Able to ambulate without issue. TOlerating PO, no n/v. Baby's chart reviewed, R arm weakness improving, seen by neuro and PT and planned for outpatient PT. Discussed w/ pt again shoulder dystocia and recommendations for CS in the future.     Over last 24hr, no hypoglycemic episodes. Per endo, ok to go home today on current pump settings. BPs also normal on procardia 30XL (ruled in for PEC w/ SF, s/p Mg 24hr pp). No current sx of PEC. Advised to f/u in office 1 week for BP check.     Vital Signs Last 24 Hrs  T(C): 36.4 (10-30-21 @ 09:25), Max: 37.1 (10-29-21 @ 17:30)  T(F): 97.5 (10-30-21 @ 09:25), Max: 98.8 (10-29-21 @ 17:30)  HR: 98 (10-30-21 @ 09:25) (81 - 99)  BP: 116/83 (10-30-21 @ 09:25) (116/83 - 136/87)  BP(mean): --  RR: 18 (10-30-21 @ 09:25) (18 - 18)  SpO2: 98% (10-30-21 @ 09:25) (97% - 100%)      CAPILLARY BLOOD GLUCOSE      POCT Blood Glucose.: 93 mg/dL (30 Oct 2021 09:20)  POCT Blood Glucose.: 118 mg/dL (30 Oct 2021 05:40)  POCT Blood Glucose.: 168 mg/dL (30 Oct 2021 02:15)  POCT Blood Glucose.: 163 mg/dL (29 Oct 2021 22:16)  POCT Blood Glucose.: 81 mg/dL (29 Oct 2021 18:32)  POCT Blood Glucose.: 97 mg/dL (29 Oct 2021 14:46)    Plan  - stable for dc home on current insulin pump settings  - continue procardia 30XL, BP check in 1 wk    Aly MURILLO

## 2021-10-30 NOTE — DISCHARGE NOTE OB - PLAN OF CARE
After discharge, please stay on pelvic rest for 6 weeks, meaning no sexual intercourse, no tampons and no douching.  No driving for 2 weeks as women can loose a lot of blood during delivery and there is a possibility of being lightheaded/fainting.  No lifting objects heavier than baby for two weeks.  Expect to have vaginal bleeding/spotting for up to six weeks.  The bleeding should get lighter and more white/light brown with time.  For bleeding soaking more than a pad an hour or passing clots greater than the size of your fist, come in to the emergency department.    Follow up in clinic in 6 weeks.    If fingerstick greater than 150 call Dr. Lind office at [phone] for titration of pump settings.  Please call office on Monday to schedule a follow up appointment outpatient.

## 2021-10-30 NOTE — DISCHARGE NOTE OB - CARE PROVIDERS DIRECT ADDRESSES
[email or web address]

## 2021-10-30 NOTE — DISCHARGE NOTE OB - CARE PLAN
1 Principal Discharge DX:	Type 1 diabetes  Assessment and plan of treatment:	After discharge, please stay on pelvic rest for 6 weeks, meaning no sexual intercourse, no tampons and no douching.  No driving for 2 weeks as women can loose a lot of blood during delivery and there is a possibility of being lightheaded/fainting.  No lifting objects heavier than baby for two weeks.  Expect to have vaginal bleeding/spotting for up to six weeks.  The bleeding should get lighter and more white/light brown with time.  For bleeding soaking more than a pad an hour or passing clots greater than the size of your fist, come in to the emergency department.    Follow up in clinic in 6 weeks.    If fingerstick greater than 150 call Dr. Lind office at [phone] for titration of pump settings.  Please call office on Monday to schedule a follow up appointment outpatient.

## 2021-10-30 NOTE — DIETITIAN INITIAL EVALUATION ADULT. - PERTINENT MEDS FT
sodium chloride 0.9%, dextrose 5% and lactated ringers  Admelog pump  insulin regular infusion  magnesium sulfate infusion  Procardia XL  magnesium hydroxide PRN  prenatal multivitamin  Mylicon PRN

## 2021-10-30 NOTE — DISCHARGE NOTE OB - CARE PROVIDER_API CALL
Ginette Corbett)  OBSGYN  General  [address] [facility] [facility] [address]  [place] [address]  Phone: [phone]  Fax: [phone]  Follow Up Time:     Raimundo Lind)  Internal Medicine  [address] [place] [id number]  Phone: [phone]  Fax: [phone]  Follow Up Time:

## 2021-10-30 NOTE — PROGRESS NOTE ADULT - PROBLEM SELECTOR PLAN 2
During this admission Basal rate was decreased  to TDD 20.5 units  due to hypoglycemia.     -Adjusted insulin pump settings as follows:  Basal  12mn 0.8  9am .9  10pm 0.8  Total basal 20.5 units  ICR: 1:15  ISF: 55  BGT: 120  IOB 4 hours   -Pt due to change insulin pump site 10/31/21  -Pt to restart Dexcom asap. Pt's mom will call company to send a transmitter overnight  -Please contact endo team with any hypoglycemic event pr persistent hyperglycemia >250.     Discussed w/ Pt/ pts mom/ and OBGYN team; emailing Dr Lind to alert her to pump dose changes and to arrange f/u as outpatient
-Adjusted insulin pump settings as follows:  Basal  12mn 1.0  9am 1.1  10pm 1.0  Total basal 25.3 units  ICR: 1:15  ISF: 55  BGT: 120  IOB 4 hours   -Pt due to change insulin pump site 10/31/21  -Pt to restart Dexcom asap. Pt's mom will call company to send a transmitter overnight  -Please contact endo team with any hypoglycemic event pr persistent hyperglycemia >250.   -Will adjust insulin pump once able to gather info from pump and CGM tomorrow

## 2021-10-30 NOTE — PROGRESS NOTE ADULT - ASSESSMENT
A/P: 20yo PPD#3 s/p  c/b shoulder dystocia, T1DM and sPEC. Patient is s/p Mg for seizure PPx and Insulin gtt, sugars managed on Omini insulin pump.     #T1DM  -s/p insulin gtt  - currently on Omini insulin pump  - monitor FS; intermittent episodes of hypoglycemia  - Endocrine following; appreciate recs  - pump settings continue to be adjusted by Endocrine    #sPEC  - s/p Mg for seizure PPx (10/28-)  - s/p Lab 20 IVP postpartum  - c/w Procardia 30XL  - monitor BPs  - HELLP labs wnl    #Postpartum  - Regular diet  - OOB, encourage ambulation  - Continue motrin, tylenol, oxycodone PRN for pain control.      Asiya Howell, PGY-3

## 2021-10-30 NOTE — DISCHARGE NOTE OB - MEDICATION SUMMARY - MEDICATIONS TO STOP TAKING
I will STOP taking the medications listed below when I get home from the hospital:    aspirin 81 mg oral tablet, chewable  -- 1 tab(s) by mouth once a day    cephalexin 500 mg oral capsule  -- 1 cap(s) by mouth 4 times a day

## 2021-10-30 NOTE — PROGRESS NOTE ADULT - ASSESSMENT
20yo F w/h/o T1DM on insulin pump Omnipod PTA. DM c/b neuropathy. Also  @37 weeks presenting for labor and delivery > now postpartum.  Tolerating POs with bg improved after reduction of basal doses yesterday. Freestyle josé manuel monitor not correlating when BG was 93 on POC this am, freestyle giving slightly higher values than POC advised pt to continue to check BG if Freestyle value does not match her symptoms. BG at goal since dinner, pt cleared from endocrine for discharge home with close endocrine followup. Advised pt if bg trending >150 please call the office and speak to oncall tomorrow if any further adjustments needed, if BG remain  can follow up routinely .  insulin pump setting lowered during this admission to keep BG goal between 100 to 180s and prevent hypoglycemia.

## 2021-10-30 NOTE — DIETITIAN INITIAL EVALUATION ADULT. - ORAL INTAKE PTA/DIET HISTORY
Pt seen by this RD during previous admission on 10/7/2021. At that time, pt declined RD interview. At that time, pt noted to be eating well.     Per chart review, pt with T1DM, on omnipod insulin pump (novolog) PTA. HbA1c 6.2% suggests good glycemic control.

## 2021-10-30 NOTE — PROGRESS NOTE ADULT - TIME BILLING
Provided face to face education which was more than 50% of encounter that also included assessing pt/ insulin pump setting, insulin pump hx, labs/meds and discussing plan of care with primary team.  Adjusting insulin pump settings  Discharge plan  Follow up care
development of plan of care/coordination of care/glycemic control through review of labs, blood glucose values and vital signs.  insulin pump

## 2021-10-30 NOTE — DISCHARGE NOTE OB - MEDICATION SUMMARY - MEDICATIONS TO TAKE
I will START or STAY ON the medications listed below when I get home from the hospital:    omnipod insulin pump  -- Max basal rate set to 1.5 units.  -- Indication: For DM1    acetaminophen 160 mg/5 mL oral suspension  -- 20.31 milliliter(s) by mouth every 6 hours, As needed, Mild Pain (1 - 3)  -- Indication: For mild pain    ibuprofen 100 mg/5 mL oral suspension  -- 30 milliliter(s) by mouth every 6 hours, As needed, Moderate Pain (4 - 6)  -- Indication: For moderate pain    Procardia XL 30 mg oral tablet, extended release  -- 1 tab(s) by mouth once a day MDD:1x/day  -- Avoid grapefruit and grapefruit juice while taking this medication.  It is very important that you take or use this exactly as directed.  Do not skip doses or discontinue unless directed by your doctor.  Some non-prescription drugs may aggravate your condition.  Read all labels carefully.  If a warning appears, check with your doctor before taking.  Swallow whole.  Do not crush.    -- Indication: For sPEC

## 2021-10-30 NOTE — DIETITIAN INITIAL EVALUATION ADULT. - OTHER INFO
Pt unavailable/sleeping soundly x multiple attempts to visit today and yesterday. Per Endocrinology, pt tolerating PO intake and eating well. No acute GI distress or difficulties chewing or swallowing noted. NKFA per chart.     Per chart, pt weighed 115 pounds pre-pregnancy. Dosing weight 139.9 pounds suggests ~25 pound weight gain during pregnancy. No new weights to assess s/p . Will continue to monitor and trend weights as able.    Of note, pt does not plan on breastfeeding infant.

## 2021-10-30 NOTE — DIETITIAN INITIAL EVALUATION ADULT. - CHIEF COMPLAINT
"22yo PPD#3 s/p  c/b shoulder dystocia, T1DM and sPEC. Patient is s/p Mg for seizure PPx and Insulin gtt, sugars managed on Omini insulin pump."

## 2021-10-30 NOTE — PROGRESS NOTE ADULT - SUBJECTIVE AND OBJECTIVE BOX
OB Progress Note    S: Patient seen and examined at bedside. Pain well controlled, tolerating regular diet, passing flatus, ambulating without difficulty, voiding spontaneously. Denies heavy vaginal bleeding, N/V, CP/SOB/lightheadedness/dizziness, headache, visual disturbances, epigastric pain.     O:   Vital Signs Last 24 Hrs  T(C): 36.5 (29 Oct 2021 00:21), Max: 37 (28 Oct 2021 12:51)  T(F): 97.7 (29 Oct 2021 00:21), Max: 98.6 (28 Oct 2021 12:51)  HR: 84 (29 Oct 2021 00:21) (78 - 98)  BP: 130/85 (29 Oct 2021 00:21) (116/71 - 146/73)  BP(mean): 94 (28 Oct 2021 10:25) (89 - 104)  RR: 18 (29 Oct 2021 00:21) (14 - 18)  SpO2: 96% (29 Oct 2021 00:21) (96% - 100%)    Labs:  Blood type: B Positive  Rubella IgG: RPR: Negative                          9.4<L>   20.20<H> >-----------< 259    ( 10-28 @ 05:07 )             29.3<L>                        10.9<L>   4.79 >-----------< 314    ( 10-27 @ 11:29 )             35.3                        9.9<L>   5.96 >-----------< 289    ( 10-27 @ 03:18 )             31.4<L>    10-28-21 @ 05:06      136  |  107  |  6<L>  ----------------------------<  205<H>  4.1   |  18<L>  |  0.89    10-27-21 @ 11:29      140  |  108  |  5<L>  ----------------------------<  83  4.8   |  19<L>  |  0.61        Ca    8.5      28 Oct 2021 05:06  Ca    9.1      27 Oct 2021 11:29  Mg     5.8<H>     10-28  Mg     7.3<HH>     10-28  Mg     4.9<H>     10-28    TPro  5.1<L>  /  Alb  2.5<L>  /  TBili  0.4  /  DBili  x   /  AST  30  /  ALT  21  /  AlkPhos  253<H>  10-28-21 @ 05:06  TPro  6.9  /  Alb  3.6  /  TBili  0.2  /  DBili  x   /  AST  30  /  ALT  28  /  AlkPhos  328<H>  10-27-21 @ 11:29          PE:  General: NAD  Abdomen: nontender nondistended, Fundus firm  : No heavy vaginal bleeding  Extremities: Nontender, no pitting edema    
OB Progress Note    S: Patient seen and examined at bedside. Pain well controlled, tolerating regular diet, passing flatus, ambulating without difficulty, voiding spontaneously. Denies heavy vaginal bleeding, N/V, CP/SOB/lightheadedness/dizziness, headache, visual disturbances, epigastric pain.     O:   Vital Signs Last 24 Hrs  T(C): 36.8 (30 Oct 2021 00:02), Max: 37.1 (29 Oct 2021 17:30)  T(F): 98.2 (30 Oct 2021 00:02), Max: 98.8 (29 Oct 2021 17:30)  HR: 98 (30 Oct 2021 00:02) (73 - 98)  BP: 122/85 (30 Oct 2021 00:02) (122/85 - 136/87)  BP(mean): --  RR: 18 (30 Oct 2021 00:02) (18 - 18)  SpO2: 97% (30 Oct 2021 00:02) (97% - 100%)    Labs:  Blood type: B Positive  Rubella IgG: RPR: Negative                          8.4<L>   13.59<H> >-----------< 247    ( 10-29 @ 06:52 )             26.9<L>                        9.4<L>   20.20<H> >-----------< 259    ( 10-28 @ 05:07 )             29.3<L>                        10.9<L>   4.79 >-----------< 314    ( 10-27 @ 11:29 )             35.3    10-29-21 @ 06:50      138  |  107  |  6<L>  ----------------------------<  48<LL>  4.0   |  20<L>  |  0.73    10-28-21 @ 05:06      136  |  107  |  6<L>  ----------------------------<  205<H>  4.1   |  18<L>  |  0.89    10-27-21 @ 11:29      140  |  108  |  5<L>  ----------------------------<  83  4.8   |  19<L>  |  0.61        Ca    8.4      29 Oct 2021 06:50  Ca    8.5      28 Oct 2021 05:06  Ca    9.1      27 Oct 2021 11:29  Mg     5.8<H>     10-28  Mg     7.3<HH>     10-28  Mg     4.9<H>     10-28    TPro  5.0<L>  /  Alb  2.4<L>  /  TBili  0.1<L>  /  DBili  x   /  AST  23  /  ALT  20  /  AlkPhos  234<H>  10-29-21 @ 06:50  TPro  5.1<L>  /  Alb  2.5<L>  /  TBili  0.4  /  DBili  x   /  AST  30  /  ALT  21  /  AlkPhos  253<H>  10-28-21 @ 05:06  TPro  6.9  /  Alb  3.6  /  TBili  0.2  /  DBili  x   /  AST  30  /  ALT  28  /  AlkPhos  328<H>  10-27-21 @ 11:29          PE:  General: NAD  Abdomen: nontender nondistended, Fundus firm  : No heavy vaginal bleeding  Extremities: Nontender, no pitting edema  
DIABETES FOLLOW UP : Seen earlier today    INTERVAL HX: seen at bedside, baby and father at bedside , pts mother on phone during visit w pts consent, BG at goal since dinner, reviewed freestyle josé maneul appt BG reading  although 75 and 79 reading did not correlated with POC which was 93. No hypoglycemia, reveiwed pump  has only bolused for meals. has not needed to give herself correction boluses on new settings. pt using formula, not breast feeding . pt reports she is comfortable to adjust her pump at home if she noticed BG trending low or high and will call office for direction if BG high or low.       Review of Systems:  General: As above  Cardiovascular: No chest pain, palpitations  Respiratory: No SOB, no cough  GI: No nausea, vomiting, abdominal pain  Endocrine: no polyuria, polydipsia or S&Sx of hypoglycemia    Allergies    Cipro (Vomiting)    Intolerances      MEDICATIONS  (STANDING):  dextrose 40% Gel 15 Gram(s) Oral once  dextrose 5% + lactated ringers. 1000 milliLiter(s) (100 mL/Hr) IV Continuous <Continuous>  dextrose 5%. 1000 milliLiter(s) (50 mL/Hr) IV Continuous <Continuous>  dextrose 5%. 1000 milliLiter(s) (100 mL/Hr) IV Continuous <Continuous>  dextrose 5%. 1000 milliLiter(s) (50 mL/Hr) IV Continuous <Continuous>  dextrose 50% Injectable 25 Gram(s) IV Push once  dextrose 50% Injectable 12.5 Gram(s) IV Push once  dextrose 50% Injectable 25 Gram(s) IV Push once  dextrose 50% Injectable 50 milliLiter(s) IV Push every 15 minutes  dextrose 50% Injectable 25 milliLiter(s) IV Push every 15 minutes  diphtheria/tetanus/pertussis (acellular) Vaccine (ADAcel) 0.5 milliLiter(s) IntraMuscular once  glucagon  Injectable 1 milliGRAM(s) IntraMuscular once  insulin lispro (ADMELOG) Pump 1 Each SubCutaneous Continuous Pump  insulin regular Infusion 0.5 Unit(s)/Hr (0.5 mL/Hr) IV Continuous <Continuous>  lactated ringers. 1000 milliLiter(s) (25 mL/Hr) IV Continuous <Continuous>  magnesium sulfate Infusion 1.5 Gm/Hr (37.5 mL/Hr) IV Continuous <Continuous>  misoprostol 1000 MICROGram(s) Rectal once  NIFEdipine XL 30 milliGRAM(s) Oral daily  oxytocin Infusion 333.333 milliUNIT(s)/Min (1000 mL/Hr) IV Continuous <Continuous>  oxytocin Infusion 333.333 milliUNIT(s)/Min (1000 mL/Hr) IV Continuous <Continuous>  oxytocin Infusion. 4 milliUNIT(s)/Min (4 mL/Hr) IV Continuous <Continuous>  oxytocin Injectable 10 Unit(s) IntraMuscular once  prenatal multivitamin 1 Tablet(s) Oral daily  sodium chloride 0.9% lock flush 3 milliLiter(s) IV Push every 8 hours  sodium chloride 0.9%. 1000 milliLiter(s) (50 mL/Hr) IV Continuous <Continuous>      PHYSICAL EXAM:  VITALS: T(C): 36.4 (10-30-21 @ 09:25)  T(F): 97.5 (10-30-21 @ 09:25), Max: 98.8 (10-29-21 @ 17:30)  HR: 98 (10-30-21 @ 09:25) (89 - 99)  BP: 116/83 (10-30-21 @ 09:25) (116/83 - 136/87)  RR:  (18 - 18)  SpO2:  (97% - 100%)  Wt(kg): --  GENERAL: female laying in bed in NAD, omnipod pump right arm   Abdomen: post partum, soft   Extremities: Warm, no edema in all 4 exts  NEURO: A&O X3    LABS:  POCT Blood Glucose.: 93 mg/dL (10-30-21 @ 09:20)  POCT Blood Glucose.: 118 mg/dL (10-30-21 @ 05:40)  POCT Blood Glucose.: 168 mg/dL (10-30-21 @ 02:15)  POCT Blood Glucose.: 163 mg/dL (10-29-21 @ 22:16)  POCT Blood Glucose.: 81 mg/dL (10-29-21 @ 18:32)  POCT Blood Glucose.: 97 mg/dL (10-29-21 @ 14:46)  POCT Blood Glucose.: 168 mg/dL (10-29-21 @ 11:13)  POCT Blood Glucose.: 141 mg/dL (10-29-21 @ 10:06)  POCT Blood Glucose.: 80 mg/dL (10-29-21 @ 09:49)  POCT Blood Glucose.: 46 mg/dL (10-29-21 @ 09:31)  POCT Blood Glucose.: 44 mg/dL (10-29-21 @ 09:19)  POCT Blood Glucose.: 44 mg/dL (10-29-21 @ 09:17)  POCT Blood Glucose.: 116 mg/dL (10-29-21 @ 06:58)  POCT Blood Glucose.: 80 mg/dL (10-29-21 @ 06:24)  POCT Blood Glucose.: 81 mg/dL (10-29-21 @ 05:58)  POCT Blood Glucose.: 73 mg/dL (10-29-21 @ 05:36)  POCT Blood Glucose.: 58 mg/dL (10-29-21 @ 05:15)  POCT Blood Glucose.: 102 mg/dL (10-29-21 @ 00:28)  POCT Blood Glucose.: 78 mg/dL (10-28-21 @ 23:50)  POCT Blood Glucose.: 87 mg/dL (10-28-21 @ 23:22)  POCT Blood Glucose.: 81 mg/dL (10-28-21 @ 23:03)  POCT Blood Glucose.: 71 mg/dL (10-28-21 @ 22:43)  POCT Blood Glucose.: 61 mg/dL (10-28-21 @ 22:24)  POCT Blood Glucose.: 215 mg/dL (10-28-21 @ 19:09)  POCT Blood Glucose.: 100 mg/dL (10-28-21 @ 15:45)  POCT Blood Glucose.: 58 mg/dL (10-28-21 @ 15:09)  POCT Blood Glucose.: 53 mg/dL (10-28-21 @ 14:46)  POCT Blood Glucose.: 48 mg/dL (10-28-21 @ 14:34)  POCT Blood Glucose.: 149 mg/dL (10-28-21 @ 10:05)  POCT Blood Glucose.: 196 mg/dL (10-28-21 @ 08:58)  POCT Blood Glucose.: 201 mg/dL (10-28-21 @ 08:05)  POCT Blood Glucose.: 219 mg/dL (10-28-21 @ 06:52)  POCT Blood Glucose.: 297 mg/dL (10-28-21 @ 05:53)  POCT Blood Glucose.: 161 mg/dL (10-28-21 @ 04:28)  POCT Blood Glucose.: 131 mg/dL (10-28-21 @ 02:48)  POCT Blood Glucose.: 152 mg/dL (10-28-21 @ 01:51)  POCT Blood Glucose.: 181 mg/dL (10-28-21 @ 01:04)  POCT Blood Glucose.: 147 mg/dL (10-27-21 @ 23:37)  POCT Blood Glucose.: 82 mg/dL (10-27-21 @ 21:14)  POCT Blood Glucose.: 69 mg/dL (10-27-21 @ 20:57)  POCT Blood Glucose.: 50 mg/dL (10-27-21 @ 19:56)  POCT Blood Glucose.: 57 mg/dL (10-27-21 @ 19:54)  POCT Blood Glucose.: 64 mg/dL (10-27-21 @ 18:56)  POCT Blood Glucose.: 55 mg/dL (10-27-21 @ 18:53)  POCT Blood Glucose.: 86 mg/dL (10-27-21 @ 17:57)  POCT Blood Glucose.: 102 mg/dL (10-27-21 @ 16:30)  POCT Blood Glucose.: 87 mg/dL (10-27-21 @ 15:13)  POCT Blood Glucose.: 64 mg/dL (10-27-21 @ 14:47)  POCT Blood Glucose.: 48 mg/dL (10-27-21 @ 14:22)  POCT Blood Glucose.: 62 mg/dL (10-27-21 @ 14:05)  POCT Blood Glucose.: 49 mg/dL (10-27-21 @ 14:04)                            8.4    13.59 )-----------( 247      ( 29 Oct 2021 06:52 )             26.9       10-29    138  |  107  |  6<L>  ----------------------------<  48<LL>  4.0   |  20<L>  |  0.73    EGFR if : 137  EGFR if non : 119    Ca    8.4      10-29  Mg     5.8     10-28    TPro  5.0<L>  /  Alb  2.4<L>  /  TBili  0.1<L>  /  DBili  x   /  AST  23  /  ALT  20  /  AlkPhos  234<H>  10-29          Thyroid Function Tests:          A1C with Estimated Average Glucose Result: 6.2 % (10-06-21 @ 10:56)      Estimated Average Glucose: 131 mg/dL (10-06-21 @ 10:56)                        
OB Progress Note    S: Patient seen and examined at bedside. Pain well controlled, has not yet eaten or ambulating or voided.  Denies heavy vaginal bleeding, N/V, CP/SOB/lightheadedness/dizziness, headache, visual disturbances, epigastric pain.     O:   Vital Signs Last 24 Hrs  T(C): 37.4 (28 Oct 2021 01:40), Max: 37.4 (28 Oct 2021 01:40)  T(F): 99.3 (28 Oct 2021 01:40), Max: 99.3 (28 Oct 2021 01:40)  HR: 94 (28 Oct 2021 02:40) (67 - 188)  BP: 128/62 (28 Oct 2021 02:40) (100/50 - 199/95)  BP(mean): 89 (28 Oct 2021 02:40) (89 - 136)  RR: 18 (28 Oct 2021 02:40) (16 - 18)  SpO2: 97% (28 Oct 2021 02:40) (83% - 100%)    Labs:  Blood type: B Positive  Rubella IgG: RPR: Negative                          10.9<L>   4.79 >-----------< 314    ( 10-27 @ 11:29 )             35.3                        9.9<L>   5.96 >-----------< 289    ( 10-27 @ 03:18 )             31.4<L>    10-27-21 @ 11:29      140  |  108  |  5<L>  ----------------------------<  83  4.8   |  19<L>  |  0.61        Ca    9.1      27 Oct 2021 11:29    TPro  6.9  /  Alb  3.6  /  TBili  0.2  /  DBili  x   /  AST  30  /  ALT  28  /  AlkPhos  328<H>  10-27-21 @ 11:29          PE:  General: NAD  Abdomen: nontender nondistended, Fundus firm  : No heavy vaginal bleeding  Extremities: Nontender, no pitting edema    
Contact info:   Raimundo Lind MD  pager [phone], please provide 10 digit call back number.   Please note that this patient may be followed by another provider tomorrow.   If no answer or after hours, please contact [phone]  Interval History/Subjective:  s/p vaginal deliver last night, 8lb 9 oz baby boy.  glucose weas elevated.  She is back to insulin pump on postpartum setting.   She's tired but has appetite.      MEDICATIONS  (STANDING):  acetaminophen     Tablet .. 975 milliGRAM(s) Oral <User Schedule>  dextrose 40% Gel 15 Gram(s) Oral once  dextrose 5% + lactated ringers. 1000 milliLiter(s) (100 mL/Hr) IV Continuous <Continuous>  dextrose 5%. 1000 milliLiter(s) (50 mL/Hr) IV Continuous <Continuous>  dextrose 5%. 1000 milliLiter(s) (100 mL/Hr) IV Continuous <Continuous>  dextrose 50% Injectable 25 Gram(s) IV Push once  dextrose 50% Injectable 12.5 Gram(s) IV Push once  dextrose 50% Injectable 25 Gram(s) IV Push once  dextrose 50% Injectable 50 milliLiter(s) IV Push every 15 minutes  dextrose 50% Injectable 25 milliLiter(s) IV Push every 15 minutes  diphtheria/tetanus/pertussis (acellular) Vaccine (ADAcel) 0.5 milliLiter(s) IntraMuscular once  glucagon  Injectable 1 milliGRAM(s) IntraMuscular once  insulin regular Infusion 0.5 Unit(s)/Hr (0.5 mL/Hr) IV Continuous <Continuous>  lactated ringers. 1000 milliLiter(s) (25 mL/Hr) IV Continuous <Continuous>  misoprostol 1000 MICROGram(s) Rectal once  NIFEdipine XL 30 milliGRAM(s) Oral daily  oxytocin Infusion 333.333 milliUNIT(s)/Min (1000 mL/Hr) IV Continuous <Continuous>  oxytocin Infusion 333.333 milliUNIT(s)/Min (1000 mL/Hr) IV Continuous <Continuous>  oxytocin Infusion. 4 milliUNIT(s)/Min (4 mL/Hr) IV Continuous <Continuous>  oxytocin Injectable 10 Unit(s) IntraMuscular once  prenatal multivitamin 1 Tablet(s) Oral daily  sodium chloride 0.9% lock flush 3 milliLiter(s) IV Push every 8 hours  sodium chloride 0.9%. 1000 milliLiter(s) (50 mL/Hr) IV Continuous <Continuous>    MEDICATIONS  (PRN):  benzocaine 20%/menthol 0.5% Spray 1 Spray(s) Topical every 6 hours PRN for Perineal discomfort  dibucaine 1% Ointment 1 Application(s) Topical every 6 hours PRN Perineal discomfort  diphenhydrAMINE 25 milliGRAM(s) Oral every 6 hours PRN Pruritus  hydrocortisone 1% Cream 1 Application(s) Topical every 6 hours PRN Moderate Pain (4-6)  ibuprofen  Suspension. 600 milliGRAM(s) Oral every 6 hours PRN Moderate Pain (4 - 6)  lanolin Ointment 1 Application(s) Topical every 6 hours PRN nipple soreness  magnesium hydroxide Suspension 30 milliLiter(s) Oral two times a day PRN Constipation  oxyCODONE    IR 5 milliGRAM(s) Oral every 3 hours PRN Moderate to Severe Pain (4-10)  oxyCODONE    IR 5 milliGRAM(s) Oral once PRN Moderate to Severe Pain (4-10)  pramoxine 1%/zinc 5% Cream 1 Application(s) Topical every 4 hours PRN Moderate Pain (4-6)  simethicone 80 milliGRAM(s) Chew every 4 hours PRN Gas  witch hazel Pads 1 Application(s) Topical every 4 hours PRN Perineal discomfort      Allergies    Cipro (Vomiting)    Intolerances      Review of Systems:  Constitutional: No fever  Eyes: No blurry vision  Neuro: No tremors  HEENT: No pain  Cardiovascular: No chest pain, palpitations  Respiratory: No SOB, no cough  GI: No nausea, vomiting, abdominal pain  : No dysuria  Skin: no rash  Psych: no depression  Endocrine: no polyuria, polydipsia  Hem/lymph: no swelling    ALL OTHER SYSTEMS REVIEWED AND NEGATIVE    PHYSICAL EXAM:  VITALS: T(C): 36.7 (10-28-21 @ 09:10)  T(F): 98.1 (10-28-21 @ 09:10), Max: 99.3 (10-28-21 @ 01:40)  HR: 90 (10-28-21 @ 10:25) (72 - 188)  BP: 123/76 (10-28-21 @ 10:25) (112/76 - 199/95)  RR:  (14 - 18)  SpO2:  (83% - 100%)  Wt(kg): --  GENERAL: NAD  EYES: No proptosis, no lid lag, anicteric  HEENT:  Atraumatic, Normocephalic, moist mucous membranes  RESPIRATORY: nonlabored respirations  PSYCH: Alert and oriented x 3, normal affect, normal mood      POCT Blood Glucose.: 149 mg/dL (10-28-21 @ 10:05)  POCT Blood Glucose.: 196 mg/dL (10-28-21 @ 08:58)  POCT Blood Glucose.: 201 mg/dL (10-28-21 @ 08:05)  POCT Blood Glucose.: 219 mg/dL (10-28-21 @ 06:52)  POCT Blood Glucose.: 297 mg/dL (10-28-21 @ 05:53)  POCT Blood Glucose.: 161 mg/dL (10-28-21 @ 04:28)  POCT Blood Glucose.: 131 mg/dL (10-28-21 @ 02:48)  POCT Blood Glucose.: 152 mg/dL (10-28-21 @ 01:51)  POCT Blood Glucose.: 181 mg/dL (10-28-21 @ 01:04)  POCT Blood Glucose.: 147 mg/dL (10-27-21 @ 23:37)  POCT Blood Glucose.: 82 mg/dL (10-27-21 @ 21:14)  POCT Blood Glucose.: 69 mg/dL (10-27-21 @ 20:57)  POCT Blood Glucose.: 50 mg/dL (10-27-21 @ 19:56)  POCT Blood Glucose.: 57 mg/dL (10-27-21 @ 19:54)  POCT Blood Glucose.: 64 mg/dL (10-27-21 @ 18:56)  POCT Blood Glucose.: 55 mg/dL (10-27-21 @ 18:53)  POCT Blood Glucose.: 86 mg/dL (10-27-21 @ 17:57)  POCT Blood Glucose.: 102 mg/dL (10-27-21 @ 16:30)  POCT Blood Glucose.: 87 mg/dL (10-27-21 @ 15:13)  POCT Blood Glucose.: 64 mg/dL (10-27-21 @ 14:47)  POCT Blood Glucose.: 48 mg/dL (10-27-21 @ 14:22)  POCT Blood Glucose.: 62 mg/dL (10-27-21 @ 14:05)  POCT Blood Glucose.: 49 mg/dL (10-27-21 @ 14:04)  POCT Blood Glucose.: 72 mg/dL (10-27-21 @ 13:03)  POCT Blood Glucose.: 71 mg/dL (10-27-21 @ 12:01)  POCT Blood Glucose.: 79 mg/dL (10-27-21 @ 11:00)  POCT Blood Glucose.: 114 mg/dL (10-27-21 @ 08:52)  POCT Blood Glucose.: 122 mg/dL (10-27-21 @ 08:17)  POCT Blood Glucose.: 129 mg/dL (10-27-21 @ 06:59)  POCT Blood Glucose.: 53 mg/dL (10-27-21 @ 05:57)  POCT Blood Glucose.: 82 mg/dL (10-27-21 @ 03:39)      10-28    136  |  107  |  6<L>  ----------------------------<  205<H>  4.1   |  18<L>  |  0.89    EGFR if : 108  EGFR if non : 93    Ca    8.5      10-28  Mg     4.9     10-28    TPro  5.1<L>  /  Alb  2.5<L>  /  TBili  0.4  /  DBili  x   /  AST  30  /  ALT  21  /  AlkPhos  253<H>  10-28      Thyroid Function Tests:                    
DIABETES FOLLOW UP NOTE: Saw pt earlier today  INTERVAL HX: Pt stable, noted pt had 3 hypoglycemic events without symptoms. Pt reports tolerating POs and eating well. Reviewed insulin pump and BG levels. Noted hypoglycemia after breakfast yesterday, after dinner last night and fasting today. Pt has Dexcom CGM at home but reports she didn't use it during pregnancy because her BG levels didn't match with her POC testing so only used glucose meter. BG 40s to 200s after delivery. Denies any pain. Pt is not going to breast feed. No order entered for insulin pump in EMR.         Review of Systems:  General: As above  Cardiovascular: No chest pain, palpitations  Respiratory: No SOB, no cough  GI: No nausea, vomiting, abdominal pain  Endocrine: no polyuria, polydipsia or S&Sx of hypoglycemia    Allergies    Cipro (Vomiting)    Intolerances      MEDICATIONS    insulin regular Infusion 0.5 Unit(s)/Hr (0.5 mL/Hr) IV Continuous <Continuous> (off)  Insulin pump on with settings as follows:  Basal:   12 am: 1.5 units/hour.   ICR: 12  ISF: 55  BGT: 120  IOB: 4 hours      PHYSICAL EXAM:  VITALS: T(C): 36.4 (10-29-21 @ 12:44)  T(F): 97.6 (10-29-21 @ 12:44), Max: 98.6 (10-28-21 @ 21:28)  HR: 81 (10-29-21 @ 12:44) (73 - 89)  BP: 132/86 (10-29-21 @ 12:44) (123/78 - 145/88)  RR:  (16 - 18)  SpO2:  (96% - 99%)  Wt(kg): --  GENERAL: Female laying in bed in NAD  Abdomen: Soft, nontender, non distended. Post partum uterus  Extremities: Warm, no edema in all 4 exts. RUE Omnipod  NEURO: A&O X3    LABS:  POCT Blood Glucose.: 168 mg/dL (10-29-21 @ 11:13)  POCT Blood Glucose.: 141 mg/dL (10-29-21 @ 10:06)  POCT Blood Glucose.: 80 mg/dL (10-29-21 @ 09:49)  POCT Blood Glucose.: 46 mg/dL (10-29-21 @ 09:31)  POCT Blood Glucose.: 44 mg/dL (10-29-21 @ 09:19)  POCT Blood Glucose.: 44 mg/dL (10-29-21 @ 09:17)  POCT Blood Glucose.: 116 mg/dL (10-29-21 @ 06:58)  POCT Blood Glucose.: 80 mg/dL (10-29-21 @ 06:24)  POCT Blood Glucose.: 81 mg/dL (10-29-21 @ 05:58)  POCT Blood Glucose.: 73 mg/dL (10-29-21 @ 05:36)  POCT Blood Glucose.: 58 mg/dL (10-29-21 @ 05:15)  POCT Blood Glucose.: 102 mg/dL (10-29-21 @ 00:28)  POCT Blood Glucose.: 78 mg/dL (10-28-21 @ 23:50)  POCT Blood Glucose.: 87 mg/dL (10-28-21 @ 23:22)  POCT Blood Glucose.: 81 mg/dL (10-28-21 @ 23:03)  POCT Blood Glucose.: 71 mg/dL (10-28-21 @ 22:43)  POCT Blood Glucose.: 61 mg/dL (10-28-21 @ 22:24)  POCT Blood Glucose.: 215 mg/dL (10-28-21 @ 19:09)  POCT Blood Glucose.: 100 mg/dL (10-28-21 @ 15:45)  POCT Blood Glucose.: 58 mg/dL (10-28-21 @ 15:09)  POCT Blood Glucose.: 53 mg/dL (10-28-21 @ 14:46)  POCT Blood Glucose.: 48 mg/dL (10-28-21 @ 14:34)  POCT Blood Glucose.: 149 mg/dL (10-28-21 @ 10:05)  POCT Blood Glucose.: 196 mg/dL (10-28-21 @ 08:58)  POCT Blood Glucose.: 201 mg/dL (10-28-21 @ 08:05)  POCT Blood Glucose.: 219 mg/dL (10-28-21 @ 06:52)  POCT Blood Glucose.: 297 mg/dL (10-28-21 @ 05:53)  POCT Blood Glucose.: 161 mg/dL (10-28-21 @ 04:28)  POCT Blood Glucose.: 131 mg/dL (10-28-21 @ 02:48)  POCT Blood Glucose.: 152 mg/dL (10-28-21 @ 01:51)  POCT Blood Glucose.: 181 mg/dL (10-28-21 @ 01:04)  POCT Blood Glucose.: 147 mg/dL (10-27-21 @ 23:37)  POCT Blood Glucose.: 82 mg/dL (10-27-21 @ 21:14)  POCT Blood Glucose.: 69 mg/dL (10-27-21 @ 20:57)  POCT Blood Glucose.: 50 mg/dL (10-27-21 @ 19:56)  POCT Blood Glucose.: 57 mg/dL (10-27-21 @ 19:54)  POCT Blood Glucose.: 64 mg/dL (10-27-21 @ 18:56)  POCT Blood Glucose.: 55 mg/dL (10-27-21 @ 18:53)  POCT Blood Glucose.: 86 mg/dL (10-27-21 @ 17:57)  POCT Blood Glucose.: 102 mg/dL (10-27-21 @ 16:30)  POCT Blood Glucose.: 87 mg/dL (10-27-21 @ 15:13)  POCT Blood Glucose.: 64 mg/dL (10-27-21 @ 14:47)  POCT Blood Glucose.: 48 mg/dL (10-27-21 @ 14:22)  POCT Blood Glucose.: 62 mg/dL (10-27-21 @ 14:05)  POCT Blood Glucose.: 49 mg/dL (10-27-21 @ 14:04)  POCT Blood Glucose.: 72 mg/dL (10-27-21 @ 13:03)  POCT Blood Glucose.: 71 mg/dL (10-27-21 @ 12:01)  POCT Blood Glucose.: 79 mg/dL (10-27-21 @ 11:00)  POCT Blood Glucose.: 114 mg/dL (10-27-21 @ 08:52)  POCT Blood Glucose.: 122 mg/dL (10-27-21 @ 08:17)  POCT Blood Glucose.: 129 mg/dL (10-27-21 @ 06:59)  POCT Blood Glucose.: 53 mg/dL (10-27-21 @ 05:57)  POCT Blood Glucose.: 82 mg/dL (10-27-21 @ 03:39)                            8.4    13.59 )-----------( 247      ( 29 Oct 2021 06:52 )             26.9       10-29    138  |  107  |  6<L>  ----------------------------<  48<LL>  4.0   |  20<L>  |  0.73    EGFR if : 137  EGFR if non : 119    Ca    8.4      10-29  Mg     5.8     10-28    TPro  5.0<L>  /  Alb  2.4<L>  /  TBili  0.1<L>  /  DBili  x   /  AST  23  /  ALT  20  /  AlkPhos  234<H>  10-29      A1C with Estimated Average Glucose Result: 6.2 % (10-06-21 @ 10:56)      Estimated Average Glucose: 131 mg/dL (10-06-21 @ 10:56)

## 2021-10-30 NOTE — PROGRESS NOTE ADULT - PROBLEM SELECTOR PLAN 1
-Test BG ac and hs and 2am anf PRN  -Please document amount of carbs and bolus insulin doses in flowsheet.   -Start CGM from home > continuing Pham CGM until pt places it with her own Dexcom at home    Disposition:  -C/w insulin pump at home  -please write Rx for Baqsimi nasal powder 3mg for severe hypoglycemia (use as directed)  -F/u with Dr Lind as out pt.

## 2021-10-30 NOTE — DIETITIAN INITIAL EVALUATION ADULT. - REASON INDICATOR FOR ASSESSMENT
Consult received for "T1DM on own insulin pump- poorly controlled during pregnancy." Source: EMR/comprehensive chart review. Pt is a 22 yo female.

## 2021-10-30 NOTE — DIETITIAN INITIAL EVALUATION ADULT. - PERTINENT LABORATORY DATA
HbA1c 6.2% (10/6)    CAPILLARY BLOOD GLUCOSE  POCT Blood Glucose.: 93 mg/dL (30 Oct 2021 09:20)  POCT Blood Glucose.: 118 mg/dL (30 Oct 2021 05:40)  POCT Blood Glucose.: 168 mg/dL (30 Oct 2021 02:15)  POCT Blood Glucose.: 163 mg/dL (29 Oct 2021 22:16)  POCT Blood Glucose.: 81 mg/dL (29 Oct 2021 18:32)

## 2021-10-30 NOTE — DISCHARGE NOTE OB - PATIENT PORTAL LINK FT
You can access the FollowMyHealth Patient Portal offered by [facility] by registering at the following website: [email or web address]. By joining [employer]’s FollowMyHealth portal, you will also be able to view your health information using other applications (apps) compatible with our system.

## 2021-11-01 ENCOUNTER — NON-APPOINTMENT (OUTPATIENT)
Age: 21
End: 2021-11-01

## 2021-11-12 ENCOUNTER — APPOINTMENT (OUTPATIENT)
Dept: OBGYN | Facility: CLINIC | Age: 21
End: 2021-11-12

## 2021-11-13 NOTE — OB RN PATIENT PROFILE - NS_ADMITVITALS_OBGYN_ALL_OB_DT
69 year old M 70 y/o M with PMHx DM, HTN, BPH, transferred from [facility] after witnessed tonic clonic seizure for unknown etiology and chronicity found also with hypoglycemia, hypotension in setting of bacteremia. Patient intubated for acute respiratory failure in setting of status epilepticus. Palliative was consulted for GOC. The patient was transferred to the PCU for compassionate extubation. 27-Oct-2021 02:19

## 2021-11-18 ENCOUNTER — RX RENEWAL (OUTPATIENT)
Age: 21
End: 2021-11-18

## 2021-12-13 ENCOUNTER — APPOINTMENT (OUTPATIENT)
Dept: OBGYN | Facility: CLINIC | Age: 21
End: 2021-12-13
Payer: COMMERCIAL

## 2021-12-13 VITALS
WEIGHT: 120 LBS | SYSTOLIC BLOOD PRESSURE: 120 MMHG | BODY MASS INDEX: 22.66 KG/M2 | HEIGHT: 61 IN | DIASTOLIC BLOOD PRESSURE: 60 MMHG

## 2021-12-13 PROCEDURE — 0503F POSTPARTUM CARE VISIT: CPT

## 2021-12-13 NOTE — HISTORY OF PRESENT ILLNESS
[Postpartum Follow Up] : postpartum follow up [Complications:___] : complications include: [unfilled] [Delivery Date: ___] : on [unfilled] [] : delivered by vaginal delivery [Male] : Delivery History: baby boy [Wt. ___] : weighing [unfilled] [Breastfeeding] : not currently nursing [BF with Difficulty] : nursing without difficulty [Resumed Menses] : has not resumed her menses [Resumed Smarr] : has not resumed intercourse [Intended Contraception] : Intended Contraception: [Back to Normal] : is back to normal in size [None] : no vaginal bleeding [Normal] : the vagina was normal [Healing Well] : is healing well [Cervix Sample Taken] : cervical sample not taken for a Pap smear [Not Done] : Examination of breasts not done [de-identified] : "River" [de-identified] : w

## 2021-12-14 ENCOUNTER — APPOINTMENT (OUTPATIENT)
Dept: ENDOCRINOLOGY | Facility: CLINIC | Age: 21
End: 2021-12-14

## 2021-12-21 ENCOUNTER — NON-APPOINTMENT (OUTPATIENT)
Age: 21
End: 2021-12-21

## 2021-12-27 RX ORDER — INSULIN ASPART 100 [IU]/ML
100 INJECTION, SOLUTION INTRAVENOUS; SUBCUTANEOUS
Qty: 9 | Refills: 1 | Status: DISCONTINUED | COMMUNITY
Start: 2019-07-23 | End: 2021-12-27

## 2022-01-03 NOTE — ED PROVIDER NOTE - NS ED NOTE AC HIGH RISK COUNTRIES
Subjective:       Patient ID: Galindo Grant is a 39 y.o. male The patient's last visit with me was on 12/27/2021.     Chief Complaint:   Chief Complaint   Patient presents with    Post-op Evaluation       Incomplete Bladder Emptying  He has an Interstim in place for incomplete bladder emptying.  He is not sure which year, 2012 or 2013.  It was placed in [place].  He is not sure if it working.    10/26/2021  He would like to schedule replacement.  The [employer] rep has indicated the battery is non-functional.  He does not want to rechargeable type.    12/27/2021  He is s/p Interstim Replacement on 12/10/2021.  He noted some pain last night at the incision.  He feels that the device is working.    01/03/2022   He went to the ED on 12/30/2021.  He feels better.  He is concerned about the bacteria.      ACTIVE MEDICAL ISSUES:  Patient Active Problem List   Diagnosis    Neurofibromatosis, type 1 (von Recklinghausen's disease)    Other diseases of respiratory system, not elsewhere classified    Other symptoms involving respiratory system and chest    Systemic inflammatory response syndrome due to non-infectious process without acute organ dysfunction    Low back pain    Dysphagia, oropharyngeal    Chest pain, atypical    CHEEK (dyspnea on exertion)    Incomplete emptying of bladder       ALLERGIES AND MEDICATIONS: updated and reviewed.  Review of patient's allergies indicates:  No Known Allergies  Current Outpatient Medications   Medication Sig    acetaminophen (TYLENOL) 500 MG tablet Take 1 tablet (500 mg total) by mouth every 6 (six) hours as needed for Pain.    atorvastatin (LIPITOR) 10 MG tablet Take 1 tablet (10 mg total) by mouth once daily.    bacitracin 500 unit/gram Oint Apply topically 2 (two) times daily.    baclofen (LIORESAL) 10 MG tablet Take 1 tablet (10 mg total) by mouth 3 (three) times daily.    calcium-vitamin D3 (CALCIUM 500 + D) 500 mg(1,250mg) -200 unit per tablet Take 1 tablet by  mouth 2 (two) times daily with meals.    cyanocobalamin (VITAMIN B-12) 1000 MCG tablet Take 100 mcg by mouth 2 (two) times a day.    cyclobenzaprine (FLEXERIL) 5 MG tablet Take 1 tablet (5 mg total) by mouth nightly as needed for Muscle spasms.    diclofenac sodium (VOLTAREN) 1 % Gel Apply 2 g topically 4 (four) times daily.    FLUoxetine 20 MG capsule Take 1 capsule (20 mg total) by mouth once daily.    HYDROcodone-acetaminophen (NORCO) 7.5-325 mg per tablet Take 1 tablet by mouth every 6 (six) hours as needed for Pain.    hydrOXYzine HCl (ATARAX) 25 MG tablet Take 1 tablet (25 mg total) by mouth 3 (three) times daily. DO NOT DRIVE AFTER TAKING MED    ibuprofen (ADVIL,MOTRIN) 600 MG tablet Take 1 tablet (600 mg total) by mouth every 6 (six) hours as needed for Pain.    levETIRAcetam (KEPPRA) 500 MG Tab Take 2 tablets (1,000 mg total) by mouth 2 (two) times daily.    levothyroxine sodium (LEVOTHYROXINE ORAL) Take by mouth.    metoprolol tartrate (LOPRESSOR) 25 MG tablet Take 0.5 tablets (12.5 mg total) by mouth 2 (two) times daily.    mupirocin (BACTROBAN) 2 % ointment Apply topically 3 (three) times daily.    naproxen (NAPROSYN) 500 MG tablet Take 500 mg by mouth 2 (two) times daily.    nitroGLYCERIN (NITROSTAT) 0.4 MG SL tablet Place 1 tablet (0.4 mg total) under the tongue every 5 (five) minutes as needed for Chest pain (Repeat in 5 min if CP persists. Go to ER if CP worsens).    simvastatin (ZOCOR) 20 MG tablet Take 20 mg by mouth every evening.    sulfamethoxazole-trimethoprim 800-160mg (BACTRIM DS) 800-160 mg Tab Take 1 tablet by mouth 2 (two) times daily. for 10 days    sumatriptan (IMITREX) 50 MG tablet 1 tab PO x1 prn. May take another tablet after 2 hours if the headache recurs. Maximum of 4 tablets a day.    tramadol (ULTRAM) 50 mg tablet Take 1 tablet (50 mg total) by mouth every 12 (twelve) hours as needed for Pain.    vitamin D (VITAMIN D3) 1000 units Tab Take 1,000 Units by mouth 2  "(two) times a day.     No current facility-administered medications for this visit.       Review of Systems   Constitutional: Negative for activity change, fatigue, fever and unexpected weight change.   HENT: Negative for congestion.    Eyes: Negative for redness.   Respiratory: Negative for chest tightness and shortness of breath.    Cardiovascular: Negative for chest pain and leg swelling.   Gastrointestinal: Negative for abdominal pain, constipation, diarrhea, nausea and vomiting.   Genitourinary: Negative for dysuria, flank pain, frequency, hematuria, penile pain, penile swelling, scrotal swelling, testicular pain and urgency.   Musculoskeletal: Negative for arthralgias and back pain.   Neurological: Negative for dizziness and light-headedness.   Psychiatric/Behavioral: Negative for behavioral problems and confusion. The patient is not nervous/anxious.    All other systems reviewed and are negative.      Objective:      Vitals:    01/03/22 1447   Weight: 79.4 kg (175 lb)   Height: 5' 4" (1.626 m)     Physical Exam  Vitals and nursing note reviewed.   Constitutional:       Appearance: He is well-developed.   HENT:      Head: Normocephalic.   Eyes:      Conjunctiva/sclera: Conjunctivae normal.   Neck:      Thyroid: No thyromegaly.      Trachea: No tracheal deviation.   Cardiovascular:      Rate and Rhythm: Normal rate.      Heart sounds: Normal heart sounds.   Pulmonary:      Effort: Pulmonary effort is normal. No respiratory distress.      Breath sounds: Normal breath sounds. No wheezing.   Abdominal:      General: Bowel sounds are normal.      Palpations: Abdomen is soft.      Tenderness: There is no abdominal tenderness. There is no rebound.      Hernia: No hernia is present.   Musculoskeletal:         General: No tenderness. Normal range of motion.      Cervical back: Normal range of motion and neck supple.      Comments: Right superior buttock incision intact, no erythema or induration    Left midline " incision with minimal erythema and no pain. clean   Lymphadenopathy:      Cervical: No cervical adenopathy.   Skin:     General: Skin is warm and dry.      Findings: No erythema or rash.   Neurological:      Mental Status: He is alert and oriented to person, place, and time.   Psychiatric:         Behavior: Behavior normal.         Thought Content: Thought content normal.         Judgment: Judgment normal.         Urine dipstick shows negative for all components.  Micro exam: negative for WBC's or RBC's.    Aerobic Bacterial Culture  Abnormal   STAPHYLOCOCCUS AUREUS   Many     Resulting Agency [facility]          Susceptibility     Staphylococcus aureus     CULTURE, AEROBIC  (SPECIFY SOURCE)     Clindamycin <=0.5 mcg/mL Resistant     Erythromycin >4 mcg/mL Resistant     Oxacillin <=0.25 mcg/mL Sensitive     Tetracycline <=4 mcg/mL Sensitive     Trimeth/Sulfa <=0.5/9.5 m... Sensitive     Vancomycin 2 mcg/mL Sensitive               Linear View         Specimen Collected: 12/30/21 13:49               Assessment:       1. Incomplete emptying of bladder          Plan:       1. Incomplete emptying of bladder  Finish Bactrim  Okay to shower  Continue Neosporin to incision            Follow up in about 1 week (around 1/10/2022) for Follow up.       No

## 2022-02-09 ENCOUNTER — APPOINTMENT (OUTPATIENT)
Dept: OBGYN | Facility: CLINIC | Age: 22
End: 2022-02-09

## 2022-02-09 ENCOUNTER — APPOINTMENT (OUTPATIENT)
Dept: OBGYN | Facility: CLINIC | Age: 22
End: 2022-02-09
Payer: MEDICAID

## 2022-02-09 ENCOUNTER — OUTPATIENT (OUTPATIENT)
Dept: OUTPATIENT SERVICES | Facility: HOSPITAL | Age: 22
LOS: 1 days | End: 2022-02-09
Payer: COMMERCIAL

## 2022-02-09 VITALS
BODY MASS INDEX: 22.28 KG/M2 | HEIGHT: 61 IN | DIASTOLIC BLOOD PRESSURE: 80 MMHG | SYSTOLIC BLOOD PRESSURE: 120 MMHG | WEIGHT: 118 LBS

## 2022-02-09 DIAGNOSIS — N76.0 ACUTE VAGINITIS: ICD-10-CM

## 2022-02-09 DIAGNOSIS — Z30.017 ENCOUNTER FOR INITIAL PRESCRIPTION OF IMPLANTABLE SUBDERMAL CONTRACEPTIVE: ICD-10-CM

## 2022-02-09 PROCEDURE — 11981 INSERTION DRUG DLVR IMPLANT: CPT

## 2022-02-09 PROCEDURE — 99213 OFFICE O/P EST LOW 20 MIN: CPT | Mod: GC,25

## 2022-02-09 PROCEDURE — 11981 INSERTION DRUG DLVR IMPLANT: CPT | Mod: NC

## 2022-02-09 PROCEDURE — G0463: CPT

## 2022-02-24 DIAGNOSIS — Z30.017 ENCOUNTER FOR INITIAL PRESCRIPTION OF IMPLANTABLE SUBDERMAL CONTRACEPTIVE: ICD-10-CM

## 2022-03-14 ENCOUNTER — NON-APPOINTMENT (OUTPATIENT)
Age: 22
End: 2022-03-14

## 2022-03-17 DIAGNOSIS — N63.10 UNSPECIFIED LUMP IN THE RIGHT BREAST, UNSPECIFIED QUADRANT: ICD-10-CM

## 2022-03-24 ENCOUNTER — APPOINTMENT (OUTPATIENT)
Dept: OBGYN | Facility: CLINIC | Age: 22
End: 2022-03-24

## 2022-04-25 ENCOUNTER — APPOINTMENT (OUTPATIENT)
Dept: ULTRASOUND IMAGING | Facility: IMAGING CENTER | Age: 22
End: 2022-04-25

## 2022-04-27 ENCOUNTER — APPOINTMENT (OUTPATIENT)
Dept: ENDOCRINOLOGY | Facility: CLINIC | Age: 22
End: 2022-04-27

## 2022-07-06 RX ORDER — INSULIN LISPRO 100 [IU]/ML
100 INJECTION, SOLUTION INTRAVENOUS; SUBCUTANEOUS
Qty: 9 | Refills: 1 | Status: DISCONTINUED | COMMUNITY
Start: 2020-12-22 | End: 2022-07-06

## 2022-07-15 ENCOUNTER — APPOINTMENT (OUTPATIENT)
Dept: ENDOCRINOLOGY | Facility: CLINIC | Age: 22
End: 2022-07-15

## 2022-07-15 VITALS
TEMPERATURE: 97.3 F | OXYGEN SATURATION: 99 % | DIASTOLIC BLOOD PRESSURE: 64 MMHG | SYSTOLIC BLOOD PRESSURE: 100 MMHG | HEART RATE: 99 BPM | BODY MASS INDEX: 22.47 KG/M2 | HEIGHT: 61 IN | WEIGHT: 119 LBS

## 2022-07-15 LAB
GLUCOSE BLDC GLUCOMTR-MCNC: 350
HBA1C MFR BLD HPLC: 8.9

## 2022-07-15 PROCEDURE — 83036 HEMOGLOBIN GLYCOSYLATED A1C: CPT | Mod: QW

## 2022-07-15 PROCEDURE — 99214 OFFICE O/P EST MOD 30 MIN: CPT

## 2022-07-15 PROCEDURE — 82962 GLUCOSE BLOOD TEST: CPT

## 2022-07-15 RX ORDER — BLOOD-GLUCOSE SENSOR
EACH MISCELLANEOUS
Qty: 9 | Refills: 1 | Status: ACTIVE | COMMUNITY
Start: 2019-02-14 | End: 1900-01-01

## 2022-07-15 RX ORDER — BLOOD-GLUCOSE TRANSMITTER
EACH MISCELLANEOUS
Qty: 2 | Refills: 3 | Status: ACTIVE | COMMUNITY
Start: 2019-02-14 | End: 1900-01-01

## 2022-07-15 NOTE — HISTORY OF PRESENT ILLNESS
[FreeTextEntry1] : Ms. FRANCY SINGLETON is a 22 year old female with history of Type 1 DM, diagnosed in 2011, at age 11.  \par At that time, she was dehydrated, feeling very sick, FSG greater than 500 mg/dl, she was in DKA and was admitted to [facility]. \par She was followed by Dr. Mireles (pediatric endocrinologist) since her diagnosis. \par She's currently taking NovoLog on insulin pump.  She uses Omni pod.\par She had a termination of pregnancy on 12/15/2020.\par Patient had a establish care with [facility] diabetes management team there for previous pregnancy. \par \par Optho:\par   There is no history of diabetic retinopathy.  Patient is states that she also follows up with a podiatrist on a regular basis.  She is going to follow-up with [facility] ophthalmologist and podiatrist.\par \par Podiatrist: due\par \par She denies any history of nephropathy.  \par \par April 8, 2021, A1c 6.8%.\par A1c 7/15/22: 8.9%\par \par Pump downloaded and reviewed:\par There are no carbs entered, there are no glucose values entered.\par Her setting were adjusted:\par 12AM : 0.8 units per hour\par 9am: 2.65 units per hour\par 10pm: 0.8 units per hour\par Insulin to carbohydrate ratio 1:15\par Correction factor 1: 55\par Target 120\par AIT 4 hours\par \par METER READINGS:\par having some lows, always happening midday. \par always dropping during the day. \par between 10-2. \par Notices she drops after breakfast, she drops after breakfast, and does not drop after dinner. \par Almost never needs to bolus herself as she does not consistently run high. \par \par 7/15/22:\par A1c 8.9%\par Gave birth October 2021, son is now 8 months old, doing well, healthy. \par Has been increasing carbohydrates.\par She states she is entering carbohydrates. \par No recent hospitalizations or illnesses.

## 2022-07-15 NOTE — ASSESSMENT
[Diabetes Foot Care] : diabetes foot care [Long Term Vascular Complications] : long term vascular complications of diabetes [Carbohydrate Consistent Diet] : carbohydrate consistent diet [Importance of Diet and Exercise] : importance of diet and exercise to improve glycemic control, achieve weight loss and improve cardiovascular health [Exercise/Effect on Glucose] : exercise/effect on glucose [Hypoglycemia Management] : hypoglycemia management [Glucagon Use] : glucagon use [Ketone Testing] : ketone testing [Action and use of Insulin] : action and use of short and long-acting insulin [Self Monitoring of Blood Glucose] : self monitoring of blood glucose [Insulin Self-Administration] : insulin self-administration [Injection Technique, Storage, Sharps Disposal] : injection technique, storage, and sharps disposal [Sick-Day Management] : sick-day management [Retinopathy Screening] : Patient was referred to ophthalmology for retinopathy screening [FreeTextEntry1] : Cruz is a 21-year-old female with history of type 1 diabetes, diagnosed in 2011, at age 11, currently pregnant.\par \par 1.  Type 1 diabetes\par A1c 8.9%\par \par Pump downloaded and reviewed:\par There are no carbs entered, there are no glucose values entered.\par Her setting were adjusted:\par 12AM : 0.8 units per hour\par 9am: 2.65 units per hour--> 2 units per hours to offset midday lows/drops reported by patient\par 10pm: 0.8 units per hour\par Insulin to carbohydrate ratio 1:15\par Correction factor 1: 55\par Target 120\par \par Follow-up with CDE, she appreciate carbohydrate counting.\par Switch to Omnipod 5. \par \par \par  \par

## 2022-09-13 ENCOUNTER — APPOINTMENT (OUTPATIENT)
Dept: OBGYN | Facility: CLINIC | Age: 22
End: 2022-09-13

## 2022-09-23 ENCOUNTER — APPOINTMENT (OUTPATIENT)
Dept: OBGYN | Facility: CLINIC | Age: 22
End: 2022-09-23

## 2022-09-23 VITALS
SYSTOLIC BLOOD PRESSURE: 111 MMHG | DIASTOLIC BLOOD PRESSURE: 79 MMHG | BODY MASS INDEX: 22.28 KG/M2 | WEIGHT: 118 LBS | HEIGHT: 61 IN

## 2022-09-23 DIAGNOSIS — N89.8 OTHER SPECIFIED NONINFLAMMATORY DISORDERS OF VAGINA: ICD-10-CM

## 2022-09-23 DIAGNOSIS — Z11.3 ENCOUNTER FOR SCREENING FOR INFECTIONS WITH A PREDOMINANTLY SEXUAL MODE OF TRANSMISSION: ICD-10-CM

## 2022-09-23 DIAGNOSIS — R39.9 UNSPECIFIED SYMPTOMS AND SIGNS INVOLVING THE GENITOURINARY SYSTEM: ICD-10-CM

## 2022-09-23 PROCEDURE — 99214 OFFICE O/P EST MOD 30 MIN: CPT | Mod: 25

## 2022-09-23 NOTE — COUNSELING
[Bladder Hygiene] : bladder hygiene [Contraception/ Emergency Contraception/ Safe Sexual Practices] : contraception, emergency contraception, safe sexual practices [STD (testing, results, tx)] : STD (testing, results, tx)

## 2022-09-28 LAB
C TRACH RRNA SPEC QL NAA+PROBE: NOT DETECTED
CANDIDA VAG CYTO: DETECTED
G VAGINALIS+PREV SP MTYP VAG QL MICRO: DETECTED
HIV1+2 AB SPEC QL IA.RAPID: NONREACTIVE
N GONORRHOEA RRNA SPEC QL NAA+PROBE: NOT DETECTED
SOURCE AMPLIFICATION: NORMAL
T PALLIDUM AB SER QL IA: NEGATIVE
T VAGINALIS VAG QL WET PREP: NOT DETECTED

## 2022-09-28 NOTE — PLAN
[FreeTextEntry1] : Patient is a 22 year old, , presenting on 2022 for vaginal irritation.\par \par #Vaginal Irritation/Discharge\par - UA/Urine Culture obtained\par - GC/CT testing sent\par - Affirm obtained and sent\par - STI testing offered/obtained\par - Discussed importance of continued condom use for STI prevention/pregnancy prevention\par - Will f/u once labs are resulted \par

## 2022-09-28 NOTE — HISTORY OF PRESENT ILLNESS
[HIV Test offered] : HIV Test offered [Syphilis test offered] : Syphilis test offered [Gonorrhea test offered] : Gonorrhea test offered [Chlamydia test offered] : Chlamydia test offered [Trichomonas test offered] : Trichomonas test offered [HPV test offered] : HPV test offered [Hepatitis B test offered] : Hepatitis B test offered [Hepatitis C test offered] : Hepatitis C test offered [N] : Patient reports normal menses [Y] : Patient uses contraception [No] : Patient does not have concerns regarding sex [Currently Active] : currently active [Men] : men [Patient would like to be screened for STIs] : Patient would like to be screened for STIs [FreeTextEntry1] : Patient is a 22 year old, , presenting on 2022 for vaginal irritation.\par \par Patient reports vainal itchiness and "clumpy discharge".  \par Also reports urinary urgency X1 week\par \par LMP:22\par \par OBHx:  , 1 TOP\par \par Sexual Hx: Most recent sexual encounter was yesterday, w/ condoms\par Contraception - Nexplanon.\par Would also like STI testing today.\par \par Patient denies any known exposure or signs/symptoms of COVID-19 at this time. \par Patient is vaccinated against COVID-19. \par \par  [LMPDate] : 9/23/22 [de-identified] : Nexplanon

## 2022-09-28 NOTE — PHYSICAL EXAM
[Appropriately responsive] : appropriately responsive [Alert] : alert [No Acute Distress] : no acute distress [Oriented x3] : oriented x3 [Soft] : soft [Non-tender] : non-tender [Non-distended] : non-distended [Labia Majora] : normal [Labia Minora] : normal [Discharge] : a  ~M vaginal discharge was present [Scant] : scant [Joanna] : yellow [Thick] : thick [Moderate] : There was moderate vaginal bleeding [Normal] : normal [Uterine Adnexae] : normal [Foul Smelling] : not foul smelling

## 2022-09-29 LAB — BACTERIA UR CULT: ABNORMAL

## 2022-09-29 RX ORDER — NITROFURANTOIN MACROCRYSTALS 100 MG/1
100 CAPSULE ORAL
Qty: 14 | Refills: 0 | Status: ACTIVE | COMMUNITY
Start: 2021-02-23 | End: 1900-01-01

## 2022-10-21 RX ORDER — FLUCONAZOLE 150 MG/1
150 TABLET ORAL
Qty: 1 | Refills: 0 | Status: ACTIVE | COMMUNITY
Start: 2022-09-26 | End: 1900-01-01

## 2022-10-21 RX ORDER — METRONIDAZOLE 7.5 MG/G
0.75 GEL VAGINAL
Qty: 1 | Refills: 0 | Status: ACTIVE | COMMUNITY
Start: 2022-09-26 | End: 1900-01-01

## 2022-10-24 ENCOUNTER — APPOINTMENT (OUTPATIENT)
Dept: ENDOCRINOLOGY | Facility: CLINIC | Age: 22
End: 2022-10-24

## 2023-01-10 ENCOUNTER — APPOINTMENT (OUTPATIENT)
Dept: ENDOCRINOLOGY | Facility: CLINIC | Age: 23
End: 2023-01-10

## 2023-01-30 RX ORDER — INSULIN PMP CART,AUT,G6/7,CNTR
EACH SUBCUTANEOUS
Qty: 1 | Refills: 0 | Status: ACTIVE | COMMUNITY
Start: 2022-07-15 | End: 1900-01-01

## 2023-01-31 RX ORDER — ELECTROLYTES/DEXTROSE
32G X 4 MM SOLUTION, ORAL ORAL
Qty: 1 | Refills: 0 | Status: ACTIVE | COMMUNITY
Start: 2023-01-31 | End: 1900-01-01

## 2023-01-31 RX ORDER — INSULIN DEGLUDEC INJECTION 100 U/ML
100 INJECTION, SOLUTION SUBCUTANEOUS DAILY
Qty: 3 | Refills: 0 | Status: ACTIVE | COMMUNITY
Start: 2022-07-15 | End: 1900-01-01

## 2023-02-01 ENCOUNTER — APPOINTMENT (OUTPATIENT)
Dept: OBGYN | Facility: CLINIC | Age: 23
End: 2023-02-01
Payer: MEDICAID

## 2023-02-01 VITALS
DIASTOLIC BLOOD PRESSURE: 77 MMHG | SYSTOLIC BLOOD PRESSURE: 110 MMHG | WEIGHT: 112 LBS | HEIGHT: 61 IN | BODY MASS INDEX: 21.14 KG/M2

## 2023-02-01 DIAGNOSIS — N76.0 ACUTE VAGINITIS: ICD-10-CM

## 2023-02-01 DIAGNOSIS — Z30.46 ENCOUNTER FOR SURVEILLANCE OF IMPLANTABLE SUBDERMAL CONTRACEPTIVE: ICD-10-CM

## 2023-02-01 PROCEDURE — 99213 OFFICE O/P EST LOW 20 MIN: CPT | Mod: 25

## 2023-02-01 PROCEDURE — 11976 REMOVE CONTRACEPTIVE CAPSULE: CPT

## 2023-02-01 RX ORDER — NYSTATIN AND TRIAMCINOLONE ACETONIDE 100000; 1 [USP'U]/G; MG/G
100000-0.1 OINTMENT TOPICAL TWICE DAILY
Qty: 1 | Refills: 1 | Status: ACTIVE | COMMUNITY
Start: 2023-02-01 | End: 1900-01-01

## 2023-02-01 RX ORDER — FLUCONAZOLE 150 MG/1
150 TABLET ORAL
Qty: 2 | Refills: 0 | Status: ACTIVE | COMMUNITY
Start: 2023-02-01 | End: 1900-01-01

## 2023-02-01 NOTE — PHYSICAL EXAM
[Normal] : normal [Moderate] : There was moderate vaginal bleeding [FreeTextEntry1] : DIFFUSE ERYTHEMA [FreeTextEntry4] : CLUMPY WHITE DISCHARGE

## 2023-02-02 LAB
C TRACH RRNA SPEC QL NAA+PROBE: NOT DETECTED
HCG SERPL-MCNC: <1 MIU/ML
N GONORRHOEA RRNA SPEC QL NAA+PROBE: NOT DETECTED
SOURCE AMPLIFICATION: NORMAL

## 2023-02-02 RX ORDER — FLUCONAZOLE 150 MG/1
150 TABLET ORAL
Qty: 2 | Refills: 0 | Status: ACTIVE | COMMUNITY
Start: 2019-08-30 | End: 1900-01-01

## 2023-02-02 RX ORDER — CLOTRIMAZOLE AND BETAMETHASONE DIPROPIONATE 10; .5 MG/G; MG/G
1-0.05 CREAM TOPICAL TWICE DAILY
Qty: 1 | Refills: 1 | Status: ACTIVE | COMMUNITY
Start: 2019-08-30 | End: 1900-01-01

## 2023-02-03 LAB
CANDIDA VAG CYTO: DETECTED
G VAGINALIS+PREV SP MTYP VAG QL MICRO: NOT DETECTED
T VAGINALIS VAG QL WET PREP: DETECTED

## 2023-02-07 ENCOUNTER — APPOINTMENT (OUTPATIENT)
Dept: OBGYN | Facility: CLINIC | Age: 23
End: 2023-02-07

## 2023-02-07 ENCOUNTER — APPOINTMENT (OUTPATIENT)
Dept: ENDOCRINOLOGY | Facility: CLINIC | Age: 23
End: 2023-02-07

## 2023-02-07 DIAGNOSIS — A59.9 TRICHOMONIASIS, UNSPECIFIED: ICD-10-CM

## 2023-02-07 RX ORDER — METRONIDAZOLE 500 MG/1
500 TABLET ORAL TWICE DAILY
Qty: 14 | Refills: 1 | Status: ACTIVE | COMMUNITY
Start: 2023-02-07 | End: 1900-01-01

## 2023-04-26 NOTE — H&P PST ADULT - GRAVIDA, OB PROFILE
[facility] needs [facility] to [facility]. Writer switched pharmacy.
Pending appointment 5/12/23  Per last office note on 10/28/22  Increase levothyroxine to 75 mcg daily  TSH in 3 months    Dejon Jackson MD at 1/25/2023  9:52 AM    Author Type: Physician Status: Signed   Increase levothyroxine to 88 mcg daily, repeat TSH prior to visit             Refill sent to pharmacy   
1

## 2023-06-27 ENCOUNTER — APPOINTMENT (OUTPATIENT)
Dept: OBGYN | Facility: CLINIC | Age: 23
End: 2023-06-27
Payer: COMMERCIAL

## 2023-06-27 VITALS
HEIGHT: 61 IN | BODY MASS INDEX: 21.79 KG/M2 | DIASTOLIC BLOOD PRESSURE: 78 MMHG | SYSTOLIC BLOOD PRESSURE: 123 MMHG | WEIGHT: 115.38 LBS

## 2023-06-27 DIAGNOSIS — Z01.419 ENCOUNTER FOR GYNECOLOGICAL EXAMINATION (GENERAL) (ROUTINE) W/OUT ABNORMAL FINDINGS: ICD-10-CM

## 2023-06-27 DIAGNOSIS — N94.89 OTHER SPECIFIED CONDITIONS ASSOCIATED WITH FEMALE GENITAL ORGANS AND MENSTRUAL CYCLE: ICD-10-CM

## 2023-06-27 PROCEDURE — 99395 PREV VISIT EST AGE 18-39: CPT

## 2023-06-29 NOTE — REASON FOR VISIT
Problem: Adult Inpatient Plan of Care  Goal: Plan of Care Review  Outcome: Ongoing, Progressing  Goal: Patient-Specific Goal (Individualized)  Outcome: Ongoing, Progressing  Goal: Absence of Hospital-Acquired Illness or Injury  Outcome: Ongoing, Progressing  Goal: Optimal Comfort and Wellbeing  Outcome: Ongoing, Progressing  Goal: Readiness for Transition of Care  Outcome: Ongoing, Progressing     Problem: Skin Injury Risk Increased  Goal: Skin Health and Integrity  Outcome: Ongoing, Progressing     Problem: Impaired Wound Healing  Goal: Optimal Wound Healing  Outcome: Ongoing, Progressing      [Follow-Up] : a follow-up evaluation of [FreeTextEntry2] : +UPT negative...

## 2023-07-01 ENCOUNTER — EMERGENCY (EMERGENCY)
Facility: HOSPITAL | Age: 23
LOS: 1 days | Discharge: HOME | End: 2023-07-01
Attending: EMERGENCY MEDICINE
Payer: COMMERCIAL

## 2023-07-01 VITALS
OXYGEN SATURATION: 99 % | HEART RATE: 90 BPM | DIASTOLIC BLOOD PRESSURE: 61 MMHG | RESPIRATION RATE: 16 BRPM | SYSTOLIC BLOOD PRESSURE: 107 MMHG

## 2023-07-01 VITALS
WEIGHT: 115.08 LBS | HEART RATE: 108 BPM | HEIGHT: 61 IN | SYSTOLIC BLOOD PRESSURE: 100 MMHG | OXYGEN SATURATION: 97 % | DIASTOLIC BLOOD PRESSURE: 66 MMHG | TEMPERATURE: 98 F | RESPIRATION RATE: 17 BRPM

## 2023-07-01 LAB
ABO + RH PNL BLD: NORMAL
ALBUMIN SERPL ELPH-MCNC: 4.3 G/DL — SIGNIFICANT CHANGE UP (ref 3.3–5)
ALP SERPL-CCNC: 69 U/L — SIGNIFICANT CHANGE UP (ref 40–120)
ALT FLD-CCNC: 10 U/L — SIGNIFICANT CHANGE UP (ref 10–45)
ANION GAP SERPL CALC-SCNC: 15 MMOL/L — SIGNIFICANT CHANGE UP (ref 5–17)
APPEARANCE UR: ABNORMAL
AST SERPL-CCNC: 13 U/L — SIGNIFICANT CHANGE UP (ref 10–40)
BACTERIA # UR AUTO: ABNORMAL
BASOPHILS # BLD AUTO: 0.03 K/UL — SIGNIFICANT CHANGE UP (ref 0–0.2)
BASOPHILS NFR BLD AUTO: 0.4 % — SIGNIFICANT CHANGE UP (ref 0–2)
BILIRUB SERPL-MCNC: 0.3 MG/DL — SIGNIFICANT CHANGE UP (ref 0.2–1.2)
BILIRUB UR-MCNC: NEGATIVE — SIGNIFICANT CHANGE UP
BLD GP AB SCN SERPL QL: NEGATIVE — SIGNIFICANT CHANGE UP
BLD GP AB SCN SERPL QL: NORMAL
BUN SERPL-MCNC: 10 MG/DL — SIGNIFICANT CHANGE UP (ref 7–23)
CALCIUM SERPL-MCNC: 9.4 MG/DL — SIGNIFICANT CHANGE UP (ref 8.4–10.5)
CHLORIDE SERPL-SCNC: 102 MMOL/L — SIGNIFICANT CHANGE UP (ref 96–108)
CO2 SERPL-SCNC: 19 MMOL/L — LOW (ref 22–31)
COLOR SPEC: YELLOW — SIGNIFICANT CHANGE UP
CREAT SERPL-MCNC: 0.52 MG/DL — SIGNIFICANT CHANGE UP (ref 0.5–1.3)
DIFF PNL FLD: NEGATIVE — SIGNIFICANT CHANGE UP
EGFR: 135 ML/MIN/1.73M2 — SIGNIFICANT CHANGE UP
EOSINOPHIL # BLD AUTO: 0.05 K/UL — SIGNIFICANT CHANGE UP (ref 0–0.5)
EOSINOPHIL NFR BLD AUTO: 0.7 % — SIGNIFICANT CHANGE UP (ref 0–6)
EPI CELLS # UR: 2 /HPF — SIGNIFICANT CHANGE UP
ESTIMATED AVERAGE GLUCOSE: 186 MG/DL
GLUCOSE SERPL-MCNC: 85 MG/DL — SIGNIFICANT CHANGE UP (ref 70–99)
GLUCOSE UR QL: ABNORMAL
HBA1C MFR BLD HPLC: 8.1 %
HCG SERPL-ACNC: HIGH MIU/ML
HCT VFR BLD CALC: 34 % — LOW (ref 34.5–45)
HCT VFR BLD CALC: 36.1 %
HGB BLD-MCNC: 11.6 G/DL — SIGNIFICANT CHANGE UP (ref 11.5–15.5)
HGB BLD-MCNC: 12 G/DL
HYALINE CASTS # UR AUTO: 86 /LPF — HIGH (ref 0–2)
IMM GRANULOCYTES NFR BLD AUTO: 0.3 % — SIGNIFICANT CHANGE UP (ref 0–0.9)
KETONES UR-MCNC: SIGNIFICANT CHANGE UP
LEUKOCYTE ESTERASE UR-ACNC: ABNORMAL
LYMPHOCYTES # BLD AUTO: 2.77 K/UL — SIGNIFICANT CHANGE UP (ref 1–3.3)
LYMPHOCYTES # BLD AUTO: 37 % — SIGNIFICANT CHANGE UP (ref 13–44)
MCHC RBC-ENTMCNC: 31.2 PG
MCHC RBC-ENTMCNC: 31.2 PG — SIGNIFICANT CHANGE UP (ref 27–34)
MCHC RBC-ENTMCNC: 33.2 GM/DL
MCHC RBC-ENTMCNC: 34.1 GM/DL — SIGNIFICANT CHANGE UP (ref 32–36)
MCV RBC AUTO: 91.4 FL — SIGNIFICANT CHANGE UP (ref 80–100)
MCV RBC AUTO: 93.8 FL
MONOCYTES # BLD AUTO: 0.41 K/UL — SIGNIFICANT CHANGE UP (ref 0–0.9)
MONOCYTES NFR BLD AUTO: 5.5 % — SIGNIFICANT CHANGE UP (ref 2–14)
NEUTROPHILS # BLD AUTO: 4.2 K/UL — SIGNIFICANT CHANGE UP (ref 1.8–7.4)
NEUTROPHILS NFR BLD AUTO: 56.1 % — SIGNIFICANT CHANGE UP (ref 43–77)
NITRITE UR-MCNC: POSITIVE
NRBC # BLD: 0 /100 WBCS — SIGNIFICANT CHANGE UP (ref 0–0)
PH UR: 6 — SIGNIFICANT CHANGE UP (ref 5–8)
PLATELET # BLD AUTO: 331 K/UL — SIGNIFICANT CHANGE UP (ref 150–400)
PLATELET # BLD AUTO: 353 K/UL
POTASSIUM SERPL-MCNC: 3.6 MMOL/L — SIGNIFICANT CHANGE UP (ref 3.5–5.3)
POTASSIUM SERPL-SCNC: 3.6 MMOL/L — SIGNIFICANT CHANGE UP (ref 3.5–5.3)
PROT SERPL-MCNC: 7.3 G/DL — SIGNIFICANT CHANGE UP (ref 6–8.3)
PROT UR-MCNC: ABNORMAL
RBC # BLD: 3.72 M/UL — LOW (ref 3.8–5.2)
RBC # BLD: 3.85 M/UL
RBC # FLD: 12.3 % — SIGNIFICANT CHANGE UP (ref 10.3–14.5)
RBC # FLD: 12.7 %
RBC CASTS # UR COMP ASSIST: 4 /HPF — SIGNIFICANT CHANGE UP (ref 0–4)
RH IG SCN BLD-IMP: POSITIVE — SIGNIFICANT CHANGE UP
SODIUM SERPL-SCNC: 136 MMOL/L — SIGNIFICANT CHANGE UP (ref 135–145)
SP GR SPEC: 1.03 — HIGH (ref 1.01–1.02)
UROBILINOGEN FLD QL: NEGATIVE — SIGNIFICANT CHANGE UP
WBC # BLD: 7.48 K/UL — SIGNIFICANT CHANGE UP (ref 3.8–10.5)
WBC # FLD AUTO: 7.48 K/UL — SIGNIFICANT CHANGE UP (ref 3.8–10.5)
WBC # FLD AUTO: 7.55 K/UL
WBC UR QL: 176 /HPF — HIGH (ref 0–5)

## 2023-07-01 PROCEDURE — 99285 EMERGENCY DEPT VISIT HI MDM: CPT

## 2023-07-01 PROCEDURE — 76817 TRANSVAGINAL US OBSTETRIC: CPT | Mod: 26

## 2023-07-01 RX ORDER — SODIUM CHLORIDE 9 MG/ML
1000 INJECTION, SOLUTION INTRAVENOUS ONCE
Refills: 0 | Status: COMPLETED | OUTPATIENT
Start: 2023-07-01 | End: 2023-07-01

## 2023-07-01 RX ORDER — CEFTRIAXONE 500 MG/1
1000 INJECTION, POWDER, FOR SOLUTION INTRAMUSCULAR; INTRAVENOUS ONCE
Refills: 0 | Status: COMPLETED | OUTPATIENT
Start: 2023-07-01 | End: 2023-07-01

## 2023-07-01 RX ORDER — ONDANSETRON 8 MG/1
4 TABLET, FILM COATED ORAL ONCE
Refills: 0 | Status: COMPLETED | OUTPATIENT
Start: 2023-07-01 | End: 2023-07-01

## 2023-07-01 RX ADMIN — CEFTRIAXONE 100 MILLIGRAM(S): 500 INJECTION, POWDER, FOR SOLUTION INTRAMUSCULAR; INTRAVENOUS at 23:08

## 2023-07-01 RX ADMIN — ONDANSETRON 4 MILLIGRAM(S): 8 TABLET, FILM COATED ORAL at 20:51

## 2023-07-01 RX ADMIN — SODIUM CHLORIDE 2000 MILLILITER(S): 9 INJECTION, SOLUTION INTRAVENOUS at 20:52

## 2023-07-01 NOTE — ED PROVIDER NOTE - ATTENDING CONTRIBUTION TO CARE
Piotr Haines MD:   I personally saw the patient and performed a substantive portion of the visit including all aspects of the medical decision making.    HPI: 23-year-old female with history of type 1 diabetes with insulin pump who is G2, P1 and currently 7 weeks pregnant who presents with lower abdominal cramping associated with NBNB vomiting since 7 PM yesterday.  Patient reports cramping to the suprapubic region and to the left pelvic region, no radiation.  10 episodes of emesis since yesterday, mostly is the food and liquid that she has ingested.  States that she has had an ultrasound and found IUP.  No history of surgery.    ROS: patient denies fevers, chills, chest pain, shortness of breath, nausea, vomiting, diarrhea, constipation, obstipation, dysuria, hematuria, urinary frequency, vaginal bleeding, leakage of fluid, vaginal discharge.    Exam:  GEN: In no acute distress, AAOx3  HENT: NCAT, no stridor  EYES: No icterus  RESP: No respiratory distress, CTAB with no wheezing/rales/rhonchi  CV: No tachycardia, normal perfusion  ABD: Abdomen soft, (+) minimal tenderness over the left pelvic region and suprapubic region.  Non-distended, no rebound, no guarding, no rigidity. No CVA tenderness.  Pelvic examination declined.  NEURO: No focal neuro deficits    MDM: Work up for first trimester pelvic pain and vomiting with quantitative HCG, H/H, T&S with Rh, U/A and Transvaginal Ultrasound. Will evaluate for complication of pregnancy, hyperemesis gravidarum. Will evaluate for asymptomatic bacteriuria and Rh status. Stable vitals, benign abdominal examination.    Differential includes but is not limited to: See above    Patient with new problems requiring additional work-up and treatment, following orders: see above  Discussed case with: N/A  Obtained and reviewed external records: Prior discharge OB note on 10/30/2021  Additional history obtained from: N/A  Chronic conditions and social determinants of health affecting care: see above Piotr Haines MD:   I personally saw the patient and performed a substantive portion of the visit including all aspects of the medical decision making.    HPI: 23-year-old female with history of type 1 diabetes with insulin pump who is G2, P1 and currently 7 weeks pregnant who presents with lower abdominal cramping associated with NBNB vomiting since 7 PM yesterday.  Patient reports cramping to the suprapubic region and to the left pelvic region, no radiation.  10 episodes of emesis since yesterday, mostly is the food and liquid that she has ingested.  States that she has had an ultrasound and found IUP.  No history of surgery.    ROS: patient denies fevers, chills, chest pain, shortness of breath, nausea, vomiting, diarrhea, constipation, obstipation, dysuria, hematuria, urinary frequency, vaginal bleeding, leakage of fluid, vaginal discharge.    Exam:  GEN: In no acute distress, AAOx3  HENT: NCAT, no stridor  EYES: No icterus  RESP: No respiratory distress, CTAB with no wheezing/rales/rhonchi  CV: No tachycardia, normal perfusion  ABD: Abdomen soft, (+) minimal tenderness over the left pelvic region and suprapubic region.  Non-distended, no rebound, no guarding, no rigidity. No CVA tenderness.  Pelvic examination declined.  NEURO: No focal neuro deficits    MDM: Work up for first trimester pelvic pain and vomiting with quantitative HCG, H/H, T&S with Rh, U/A and Transvaginal Ultrasound. Will evaluate for complication of pregnancy, hyperemesis gravidarum. Will evaluate for asymptomatic bacteriuria and Rh status. Stable vitals, benign abdominal examination.    As patient has been having difficulty tolerating p.o., and has insulin pump, at risk for hypoglycemia, will obtain frequent fingersticks, and have patient discontinue the pump.    Differential includes but is not limited to: See above    Patient with new problems requiring additional work-up and treatment, following orders: see above  Discussed case with: N/A  Obtained and reviewed external records: Prior discharge OB note on 10/30/2021  Additional history obtained from: N/A  Chronic conditions and social determinants of health affecting care: see above Piotr Haines MD:   I personally saw the patient and performed a substantive portion of the visit including all aspects of the medical decision making.    HPI: 23-year-old female with history of type 1 diabetes with insulin pump who is G2, P1 and currently 7 weeks pregnant who presents with lower abdominal cramping associated with NBNB vomiting since 7 PM yesterday.  Patient reports cramping to the suprapubic region and to the left pelvic region, no radiation.  10 episodes of emesis since yesterday, mostly is the food and liquid that she has ingested.  States that she has had an ultrasound and found IUP.  No history of surgery.    ROS: patient denies fevers, chills, chest pain, shortness of breath, nausea, vomiting, diarrhea, constipation, obstipation, dysuria, hematuria, urinary frequency, vaginal bleeding, leakage of fluid, vaginal discharge.    Exam:  GEN: In no acute distress, AAOx3  HENT: NCAT, no stridor  EYES: No icterus  RESP: No respiratory distress, CTAB with no wheezing/rales/rhonchi  CV: No tachycardia, normal perfusion  ABD: Abdomen soft, (+) minimal tenderness over the left pelvic region and suprapubic region.  Non-distended, no rebound, no guarding, no rigidity. No CVA tenderness.  Pelvic examination declined.  NEURO: No focal neuro deficits    MDM: Work up for first trimester pelvic pain and vomiting with quantitative HCG, H/H, T&S with Rh, U/A and Transvaginal Ultrasound. Will evaluate for complication of pregnancy, hyperemesis gravidarum. Will evaluate for asymptomatic bacteriuria and Rh status. Stable vitals, benign abdominal examination.    As patient has been having difficulty tolerating p.o., and has insulin pump, at risk for hypoglycemia, will obtain frequent fingersticks, and have patient discontinue the pump.    Patient had an episode of hypoglycemia while in the ED, was asymptomatic from this.  Her Omnipod was paused via her phone camille.  Repeat fingerstick after patient had juice by mouth with improvement to 89.  hCG of 45,000.  Urinalysis with large leukocyte esterase and positive nitrite, patient started on ceftriaxone.  Pending TVUS and OB/GYN consultation    Differential includes but is not limited to: See above    Patient with new problems requiring additional work-up and treatment, following orders: see above  Discussed case with: N/A  Obtained and reviewed external records: Prior discharge OB note on 10/30/2021  Additional history obtained from: N/A  Chronic conditions and social determinants of health affecting care: see above Piotr Haines MD:   I personally saw the patient and performed a substantive portion of the visit including all aspects of the medical decision making.    HPI: 23-year-old female with history of type 1 diabetes with insulin pump who is G2, P1 and currently 7 weeks pregnant who presents with lower abdominal cramping associated with NBNB vomiting since 7 PM yesterday.  Patient reports cramping to the suprapubic region and to the left pelvic region, no radiation.  10 episodes of emesis since yesterday, mostly is the food and liquid that she has ingested.  States that she has had an ultrasound and found IUP.  No history of surgery.    ROS: patient denies fevers, chills, chest pain, shortness of breath, nausea, vomiting, diarrhea, constipation, obstipation, dysuria, hematuria, urinary frequency, vaginal bleeding, leakage of fluid, vaginal discharge.    Exam:  GEN: In no acute distress, AAOx3  HENT: NCAT, no stridor  EYES: No icterus  RESP: No respiratory distress, CTAB with no wheezing/rales/rhonchi  CV: No tachycardia, normal perfusion  ABD: Abdomen soft, (+) minimal tenderness over the left pelvic region and suprapubic region.  Non-distended, no rebound, no guarding, no rigidity. No CVA tenderness.  Pelvic examination declined.  NEURO: No focal neuro deficits    MDM: Work up for first trimester pelvic pain and vomiting with quantitative HCG, H/H, T&S with Rh, U/A and Transvaginal Ultrasound. Will evaluate for complication of pregnancy, hyperemesis gravidarum. Will evaluate for asymptomatic bacteriuria and Rh status. Stable vitals, benign abdominal examination.    As patient has been having difficulty tolerating p.o., and has insulin pump, at risk for hypoglycemia, will obtain frequent fingersticks, and have patient discontinue the pump.    Patient had an episode of hypoglycemia while in the ED, was asymptomatic from this.  Her Omnipod was paused via her phone camille.  Repeat fingerstick after patient had juice by mouth with improvement to 89.  hCG of 45,000.  Urinalysis with large leukocyte esterase and positive nitrite, patient started on ceftriaxone.    Signed out at 2300 pending  TVUS and OB/GYN consultation and close reassessments for further treatment and disposition decisions.    Differential includes but is not limited to: See above    Patient with new problems requiring additional work-up and treatment, following orders: see above  Discussed case with: N/A  Obtained and reviewed external records: Prior discharge OB note on 10/30/2021  Additional history obtained from: N/A  Chronic conditions and social determinants of health affecting care: see above

## 2023-07-01 NOTE — CONSULT NOTE ADULT - SUBJECTIVE AND OBJECTIVE BOX
GYN Consult Note    23y, h/o T1DM,  @8w (LMP 23) who presents with nausea and vomiting, unable to tolerate PO intake since 10am. Reports 10 episodes of vomiting today. Denies any sick contacts, fevers, chills, HA. Patient received Zofran in the ED, and she is reporting improvement in symptoms and has been tolerating PO intake since.     Of note, ED urinalysis also showed nitrates, concerning for UTI. Patient started on Ceftriaxone in ED. Tolerating this well without complications. Denies dysuria, hematuria, or suprapubic pain. She notes that she did notice her urine was darker in color today, which she states is often an indicator that she may be developing a UTI.    OB/GYN HISTORY:   Physician: Dr. Isaac  Ob:   - G1:   FT (8#9)  Gyn: Denies fibroids, cysts, PCOS, endometriosis, or history of genital lesions   PMH: Denies  PAST MEDICAL & SURGICAL HISTORY:  Type 1 diabetes  PSH: Denies   Meds:  Novalog (ins pump)  Allergies:   Cipro (Vomiting)      REVIEW OF SYSTEMS  General: denies fevers, chills, tiredness  Skin/Breast: denies breast pain  Respiratory and Thorax: denies shortness of breath or cough  Cardiovascular: denies chest pain, palpitations  Gastrointestinal: denies abdominal pain; reports improvement in nausea and vomiting	  Genitourinary: denies dysuria, increased urinary frequency, urgency, abnormal vaginal discharge,   Constitutional, Cardiovascular, Respiratory, Gastrointestinal, Genitourinary, Musculoskeletal and Integumentary review of systems are normal except as noted. 	      Vital Signs Last 24 Hrs  T(C): 36.9 (2023 19:43), Max: 36.9 (2023 19:43)  T(F): 98.5 (2023 19:43), Max: 98.5 (2023 19:43)  HR: 90 (2023 23:02) (90 - 108)  BP: 107/61 (2023 23:02) (100/66 - 107/61)  BP(mean): 74 (2023 23:02) (74 - 74)  RR: 16 (2023 23:02) (16 - 17)  SpO2: 99% (2023 23:02) (97% - 99%)    Parameters below as of 2023 23:02  Patient On (Oxygen Delivery Method): room air        PHYSICAL EXAM: Chaperoned w/ RN at bedside.   Gen: NAD, alert and oriented x 3  Cardiovascular: regular   Respiratory: breathing comfortably on RA  Abd: soft, non tender, non-distended  Extremities: NTBL  Skin: warm and well perfused      LABS:                        11.6   7.48  )-----------( 331      ( 2023 20:42 )             34.0     07-    136  |  102  |  10  ----------------------------<  85  3.6   |  19<L>  |  0.52    Ca    9.4      2023 20:42    TPro  7.3  /  Alb  4.3  /  TBili  0.3  /  DBili  x   /  AST  13  /  ALT  10  /  AlkPhos  69  07-01      Urinalysis Basic - ( 2023 20:42 )    Color: Yellow / Appearance: Slightly Turbid / S.028 / pH: x  Gluc: 85 mg/dL / Ketone: Trace  / Bili: Negative / Urobili: Negative   Blood: x / Protein: 30 mg/dL / Nitrite: Positive   Leuk Esterase: Large / RBC: 4 /hpf /  /HPF   Sq Epi: x / Non Sq Epi: x / Bacteria: Many        RADIOLOGY & ADDITIONAL STUDIES:

## 2023-07-01 NOTE — ED ADULT NURSE REASSESSMENT NOTE - NS ED NURSE REASSESS COMMENT FT1
Pt tolerated PO meal without complaints of N/v. Per md Finch, pt told to turn on her insulin pump at 23:37

## 2023-07-01 NOTE — ED PROVIDER NOTE - PROGRESS NOTE DETAILS
Dr. Jara:  Received in s/o. Seen by OB res, awaiting final recs and TVUS read. Improved sp zofran. Currently taking PO. Turning pump back on now. Will monitor FS. Dr. Jara:  OB to send Rx for Diclegis and Zofran, has a FU appt on 7/11. Will also send Rx for Abx for UTI. Pt feeling well. Agreeable to plan. Will check additional FS and DC.

## 2023-07-01 NOTE — CONSULT NOTE ADULT - ASSESSMENT
This is a 23yof,  h/o T1DM, who is currently 8 weeks pregnant by LMP who presented to the ED with nausea/vomiting today with inability to tolerate PO intake since 10am today. Received Zofran with resolution of symptoms and has been tolerating PO intake since medication administration. She was also found UA c/f UTI and started on Ceftriaxone.    #Nausea/Vomiting  - not concerning for hyperemesis gravidarum at this time given no electrolyte abnormalities or weight loss  - symptoms improved with Zofrn  - Rx sent for Diglesis to be taken nightly (can be taken additionally in the AM if with continued nausea); zofran PO PRN as well  - patient to continue routine f/u with Dr. Isaac - next appt scheduled for 23      #UTI  - recommend Augmentin 500mg BID for 7 days  - f/u with Dr. Isaac for management of abx      No indication of admission at this time.   Continue rest of care per ED team      D/w Dr. Low Ag, PGY2 This is a 23yof,  h/o T1DM, who is currently 8 weeks pregnant by LMP who presented to the ED with nausea/vomiting today with inability to tolerate PO intake since 10am today. Received Zofran with resolution of symptoms and has been tolerating PO intake since medication administration. She was also found UA c/f UTI and started on Ceftriaxone.    #Nausea/Vomiting  - not concerning for hyperemesis gravidarum at this time given no electrolyte abnormalities or weight loss  - symptoms improved with Zofrn  - Rx sent for Diglesis to be taken nightly (can be taken additionally in the AM if with continued nausea); zofran PO PRN as well  - patient to continue routine f/u with Dr. Isaac - next appt scheduled for 23      #UTI  - recommend sending urine cultures for speciation and with sensitivities  - recommend Augmentin 500mg BID for 7 days  - f/u with Dr. Isaac for management of abx      No indication of admission at this time.   Continue rest of care per ED team      D/w Dr. Low Ag, PGY2

## 2023-07-01 NOTE — ED PROVIDER NOTE - NSFOLLOWUPINSTRUCTIONS_ED_ALL_ED_FT
You were seen in the ER for nausea and vomiting during pregnancy. Your lab testing was significant for mild dehydration and a urinary tract infection. You received antinausea medications and an antibiotic for your urine infection. You were able to eat and drink. You were seen by OBGYN. They will be sending you a Rx for Diclegis and Zofran for your nausea. We also sent a Rx for an antibiotic for your UTI which you can start tomorrow. STay well hydrated. Trend your sugars at home. Return for any worsening of your symptoms, persistent vomiting, inability to tolerate food or drink, high or low fingerstick glucose readings. Follow up with your OBGYN on July 11th as scheduled.

## 2023-07-01 NOTE — ED PROVIDER NOTE - PATIENT PORTAL LINK FT
You can access the FollowMyHealth Patient Portal offered by [facility] by registering at the following website: [email or web address]. By joining [employer]’s FollowMyHealth portal, you will also be able to view your health information using other applications (apps) compatible with our system.

## 2023-07-01 NOTE — ED ADULT NURSE NOTE - NSFALLUNIVINTERV_ED_ALL_ED
Bed/Stretcher in lowest position, wheels locked, appropriate side rails in place/Call bell, personal items and telephone in reach/Instruct patient to call for assistance before getting out of bed/chair/stretcher/Non-slip footwear applied when patient is off stretcher/[place] to call system/Physically safe environment - no spills, clutter or unnecessary equipment/Purposeful proactive rounding/Room/bathroom lighting operational, light cord in reach

## 2023-07-01 NOTE — ED ADULT NURSE REASSESSMENT NOTE - NS ED NURSE REASSESS COMMENT FT1
BGL 62, pt provided PO Juice. Per MD Finch request, pt suspended insulin port from providing basal insulin.

## 2023-07-01 NOTE — ED ADULT TRIAGE NOTE - CHIEF COMPLAINT QUOTE
vomiting since yesterday, unable to keep food down & abdominal cramping  7 weeks pregnant, has had US to confirm placement

## 2023-07-01 NOTE — ED PROVIDER NOTE - CARE PLAN
1 Principal Discharge DX:	Hyperemesis gravidarum  Secondary Diagnosis:	Acute UTI  Secondary Diagnosis:	Hypoglycemia

## 2023-07-02 PROCEDURE — 84702 CHORIONIC GONADOTROPIN TEST: CPT

## 2023-07-02 PROCEDURE — 76817 TRANSVAGINAL US OBSTETRIC: CPT

## 2023-07-02 PROCEDURE — 85025 COMPLETE CBC W/AUTO DIFF WBC: CPT

## 2023-07-02 PROCEDURE — 36415 COLL VENOUS BLD VENIPUNCTURE: CPT

## 2023-07-02 PROCEDURE — 81001 URINALYSIS AUTO W/SCOPE: CPT

## 2023-07-02 PROCEDURE — 96375 TX/PRO/DX INJ NEW DRUG ADDON: CPT

## 2023-07-02 PROCEDURE — 82962 GLUCOSE BLOOD TEST: CPT

## 2023-07-02 PROCEDURE — 86850 RBC ANTIBODY SCREEN: CPT

## 2023-07-02 PROCEDURE — 87086 URINE CULTURE/COLONY COUNT: CPT

## 2023-07-02 PROCEDURE — 86901 BLOOD TYPING SEROLOGIC RH(D): CPT

## 2023-07-02 PROCEDURE — 86900 BLOOD TYPING SEROLOGIC ABO: CPT

## 2023-07-02 PROCEDURE — 96374 THER/PROPH/DIAG INJ IV PUSH: CPT

## 2023-07-02 PROCEDURE — 99284 EMERGENCY DEPT VISIT MOD MDM: CPT | Mod: 25

## 2023-07-02 PROCEDURE — 87186 SC STD MICRODIL/AGAR DIL: CPT

## 2023-07-02 PROCEDURE — 80053 COMPREHEN METABOLIC PANEL: CPT

## 2023-07-02 RX ORDER — ONDANSETRON 8 MG/1
1 TABLET, FILM COATED ORAL
Qty: 30 | Refills: 2
Start: 2023-07-02

## 2023-07-02 RX ORDER — CEPHALEXIN 500 MG
1 CAPSULE ORAL
Qty: 21 | Refills: 0
Start: 2023-07-02 | End: 2023-07-08

## 2023-07-02 RX ORDER — DOXYLAMINE SUCCINATE AND PYRIDOXINE HYDROCHLORIDE, DELAYED RELEASE TABLETS 10 MG/10 MG 10; 10 MG/1; MG/1
1 TABLET, DELAYED RELEASE ORAL
Qty: 30 | Refills: 3
Start: 2023-07-02

## 2023-07-04 NOTE — ED POST DISCHARGE NOTE - RESULT SUMMARY
Ucx prelim w/ >100 k ecoli on Keflex, will follow sens and determine need to change abx. - Cindy Nichols PA-C

## 2023-07-10 ENCOUNTER — APPOINTMENT (OUTPATIENT)
Dept: OBGYN | Facility: CLINIC | Age: 23
End: 2023-07-10
Payer: COMMERCIAL

## 2023-07-10 ENCOUNTER — OUTPATIENT (OUTPATIENT)
Dept: OUTPATIENT SERVICES | Facility: HOSPITAL | Age: 23
LOS: 1 days | End: 2023-07-10
Payer: COMMERCIAL

## 2023-07-10 VITALS
RESPIRATION RATE: 18 BRPM | TEMPERATURE: 98 F | WEIGHT: 119.05 LBS | OXYGEN SATURATION: 99 % | DIASTOLIC BLOOD PRESSURE: 62 MMHG | HEIGHT: 61 IN | HEART RATE: 92 BPM | SYSTOLIC BLOOD PRESSURE: 98 MMHG

## 2023-07-10 VITALS
HEIGHT: 61 IN | BODY MASS INDEX: 22.87 KG/M2 | SYSTOLIC BLOOD PRESSURE: 105 MMHG | WEIGHT: 121.13 LBS | DIASTOLIC BLOOD PRESSURE: 68 MMHG

## 2023-07-10 DIAGNOSIS — Z01.818 ENCOUNTER FOR OTHER PREPROCEDURAL EXAMINATION: ICD-10-CM

## 2023-07-10 DIAGNOSIS — Z33.2 ENCOUNTER FOR ELECTIVE TERMINATION OF PREGNANCY: ICD-10-CM

## 2023-07-10 DIAGNOSIS — Z34.90 ENCOUNTER FOR SUPERVISION OF NORMAL PREGNANCY, UNSPECIFIED, UNSPECIFIED TRIMESTER: ICD-10-CM

## 2023-07-10 DIAGNOSIS — Z98.890 OTHER SPECIFIED POSTPROCEDURAL STATES: Chronic | ICD-10-CM

## 2023-07-10 DIAGNOSIS — E10.9 TYPE 1 DIABETES MELLITUS WITHOUT COMPLICATIONS: ICD-10-CM

## 2023-07-10 LAB
ANION GAP SERPL CALC-SCNC: 11 MMOL/L — SIGNIFICANT CHANGE UP (ref 5–17)
BLD GP AB SCN SERPL QL: NEGATIVE — SIGNIFICANT CHANGE UP
BUN SERPL-MCNC: 9 MG/DL — SIGNIFICANT CHANGE UP (ref 7–23)
CALCIUM SERPL-MCNC: 9.6 MG/DL — SIGNIFICANT CHANGE UP (ref 8.4–10.5)
CHLORIDE SERPL-SCNC: 101 MMOL/L — SIGNIFICANT CHANGE UP (ref 96–108)
CO2 SERPL-SCNC: 24 MMOL/L — SIGNIFICANT CHANGE UP (ref 22–31)
CREAT SERPL-MCNC: 0.51 MG/DL — SIGNIFICANT CHANGE UP (ref 0.5–1.3)
EGFR: 135 ML/MIN/1.73M2 — SIGNIFICANT CHANGE UP
GLUCOSE SERPL-MCNC: 313 MG/DL — HIGH (ref 70–99)
HCT VFR BLD CALC: 35.5 % — SIGNIFICANT CHANGE UP (ref 34.5–45)
HGB BLD-MCNC: 11.8 G/DL — SIGNIFICANT CHANGE UP (ref 11.5–15.5)
MCHC RBC-ENTMCNC: 31.6 PG — SIGNIFICANT CHANGE UP (ref 27–34)
MCHC RBC-ENTMCNC: 33.2 GM/DL — SIGNIFICANT CHANGE UP (ref 32–36)
MCV RBC AUTO: 94.9 FL — SIGNIFICANT CHANGE UP (ref 80–100)
NRBC # BLD: 0 /100 WBCS — SIGNIFICANT CHANGE UP (ref 0–0)
PLATELET # BLD AUTO: 386 K/UL — SIGNIFICANT CHANGE UP (ref 150–400)
POTASSIUM SERPL-MCNC: 4 MMOL/L — SIGNIFICANT CHANGE UP (ref 3.5–5.3)
POTASSIUM SERPL-SCNC: 4 MMOL/L — SIGNIFICANT CHANGE UP (ref 3.5–5.3)
RBC # BLD: 3.74 M/UL — LOW (ref 3.8–5.2)
RBC # FLD: 12.7 % — SIGNIFICANT CHANGE UP (ref 10.3–14.5)
RH IG SCN BLD-IMP: POSITIVE — SIGNIFICANT CHANGE UP
SODIUM SERPL-SCNC: 136 MMOL/L — SIGNIFICANT CHANGE UP (ref 135–145)
WBC # BLD: 7.37 K/UL — SIGNIFICANT CHANGE UP (ref 3.8–10.5)
WBC # FLD AUTO: 7.37 K/UL — SIGNIFICANT CHANGE UP (ref 3.8–10.5)

## 2023-07-10 PROCEDURE — 80048 BASIC METABOLIC PNL TOTAL CA: CPT

## 2023-07-10 PROCEDURE — 85027 COMPLETE CBC AUTOMATED: CPT

## 2023-07-10 PROCEDURE — 86850 RBC ANTIBODY SCREEN: CPT

## 2023-07-10 PROCEDURE — 86900 BLOOD TYPING SEROLOGIC ABO: CPT

## 2023-07-10 PROCEDURE — 83036 HEMOGLOBIN GLYCOSYLATED A1C: CPT

## 2023-07-10 PROCEDURE — 76816 OB US FOLLOW-UP PER FETUS: CPT | Mod: 26

## 2023-07-10 PROCEDURE — G0463: CPT

## 2023-07-10 PROCEDURE — 86901 BLOOD TYPING SEROLOGIC RH(D): CPT

## 2023-07-10 PROCEDURE — 99214 OFFICE O/P EST MOD 30 MIN: CPT

## 2023-07-10 RX ORDER — SODIUM CHLORIDE 9 MG/ML
1000 INJECTION, SOLUTION INTRAVENOUS
Refills: 0 | Status: DISCONTINUED | OUTPATIENT
Start: 2023-07-12 | End: 2023-07-26

## 2023-07-10 RX ORDER — SODIUM CHLORIDE 9 MG/ML
3 INJECTION INTRAMUSCULAR; INTRAVENOUS; SUBCUTANEOUS EVERY 8 HOURS
Refills: 0 | Status: DISCONTINUED | OUTPATIENT
Start: 2023-07-12 | End: 2023-07-26

## 2023-07-10 RX ORDER — LIDOCAINE HCL 20 MG/ML
0.2 VIAL (ML) INJECTION ONCE
Refills: 0 | Status: DISCONTINUED | OUTPATIENT
Start: 2023-07-12 | End: 2023-07-26

## 2023-07-10 NOTE — H&P PST ADULT - ASSESSMENT
ACTIVITY-  active able to tolerate moderate exercise without any cardiac distress  Energy Expenditure(Mets):    8.97 by dasi  Symptoms-none     Airway:  normal    Mallampati-     1  Dental: Patient denies loose teeth

## 2023-07-10 NOTE — H&P PST ADULT - HISTORY OF PRESENT ILLNESS
22 yo female  9w6d , with a history of  Type 1 DM diagnosed in , at age 11, currently on insulin pump (Omnipod, self regulating insulin pump; Humalog). Presents to PST for Dilation and Curettage with Ultrasound Guidance on 23 due to unplanned, undesired pregnancy. Endorses nausea, denies vomiting. Denies any cramping or bleeding at this time.

## 2023-07-10 NOTE — H&P PST ADULT - PROBLEM SELECTOR PLAN 1
Dilation and curettage with Ultrasound Guidance on 7/12/23   pre-op instructions discussed  labs ent

## 2023-07-11 ENCOUNTER — TRANSCRIPTION ENCOUNTER (OUTPATIENT)
Age: 23
End: 2023-07-11

## 2023-07-11 LAB
A1C WITH ESTIMATED AVERAGE GLUCOSE RESULT: 7.4 % — HIGH (ref 4–5.6)
C TRACH RRNA SPEC QL NAA+PROBE: NOT DETECTED
ESTIMATED AVERAGE GLUCOSE: 166 MG/DL — HIGH (ref 68–114)
N GONORRHOEA RRNA SPEC QL NAA+PROBE: NOT DETECTED
SOURCE AMPLIFICATION: NORMAL

## 2023-07-12 ENCOUNTER — RESULT REVIEW (OUTPATIENT)
Age: 23
End: 2023-07-12

## 2023-07-12 ENCOUNTER — OUTPATIENT (OUTPATIENT)
Dept: OUTPATIENT SERVICES | Facility: HOSPITAL | Age: 23
LOS: 1 days | End: 2023-07-12
Payer: COMMERCIAL

## 2023-07-12 ENCOUNTER — APPOINTMENT (OUTPATIENT)
Dept: OBGYN | Facility: CLINIC | Age: 23
End: 2023-07-12

## 2023-07-12 ENCOUNTER — TRANSCRIPTION ENCOUNTER (OUTPATIENT)
Age: 23
End: 2023-07-12

## 2023-07-12 VITALS
HEART RATE: 99 BPM | HEIGHT: 61 IN | DIASTOLIC BLOOD PRESSURE: 66 MMHG | RESPIRATION RATE: 18 BRPM | TEMPERATURE: 97 F | WEIGHT: 119.05 LBS | SYSTOLIC BLOOD PRESSURE: 102 MMHG | OXYGEN SATURATION: 98 %

## 2023-07-12 VITALS
TEMPERATURE: 97 F | SYSTOLIC BLOOD PRESSURE: 100 MMHG | HEART RATE: 82 BPM | OXYGEN SATURATION: 99 % | DIASTOLIC BLOOD PRESSURE: 56 MMHG | RESPIRATION RATE: 18 BRPM

## 2023-07-12 DIAGNOSIS — Z33.2 ENCOUNTER FOR ELECTIVE TERMINATION OF PREGNANCY: ICD-10-CM

## 2023-07-12 DIAGNOSIS — Z98.890 OTHER SPECIFIED POSTPROCEDURAL STATES: Chronic | ICD-10-CM

## 2023-07-12 DIAGNOSIS — R87.612 LOW GRADE SQUAMOUS INTRAEPITHELIAL LESION ON CYTOLOGIC SMEAR OF CERVIX (LGSIL): ICD-10-CM

## 2023-07-12 LAB
GLUCOSE BLDC GLUCOMTR-MCNC: 149 MG/DL — HIGH (ref 70–99)
GLUCOSE BLDC GLUCOMTR-MCNC: 71 MG/DL — SIGNIFICANT CHANGE UP (ref 70–99)

## 2023-07-12 PROCEDURE — 59840 INDUCED ABORTION D&C: CPT

## 2023-07-12 PROCEDURE — 88305 TISSUE EXAM BY PATHOLOGIST: CPT

## 2023-07-12 PROCEDURE — 82962 GLUCOSE BLOOD TEST: CPT

## 2023-07-12 PROCEDURE — 88305 TISSUE EXAM BY PATHOLOGIST: CPT | Mod: 26

## 2023-07-12 RX ORDER — ONDANSETRON 8 MG/1
4 TABLET, FILM COATED ORAL ONCE
Refills: 0 | Status: DISCONTINUED | OUTPATIENT
Start: 2023-07-12 | End: 2023-07-26

## 2023-07-12 RX ORDER — FENTANYL CITRATE 50 UG/ML
25 INJECTION INTRAVENOUS
Refills: 0 | Status: DISCONTINUED | OUTPATIENT
Start: 2023-07-12 | End: 2023-07-12

## 2023-07-12 NOTE — ASU DISCHARGE PLAN (ADULT/PEDIATRIC) - NS MD DC FALL RISK RISK
For information on Fall & Injury Prevention, visit: [email or web address] OR  [email or web address] OR  https://www.cdc.gov/steadi/patient.html

## 2023-07-12 NOTE — BRIEF OPERATIVE NOTE - NSICDXBRIEFPROCEDURE_GEN_ALL_CORE_FT
PROCEDURES:  Induction of  by dilation and curettage 2023 09:55:34 1. EUA  2. dilation and curettage for termination of pregnancy under ultrasound guidance Daniella Noland

## 2023-07-12 NOTE — BRIEF OPERATIVE NOTE - NSICDXBRIEFPREOP_GEN_ALL_CORE_FT
PRE-OP DIAGNOSIS:  Type 1 diabetes 12-Jul-2023 09:57:03  Daniella Noland  Unplanned unwanted pregnancy 12-Jul-2023 09:56:39 1. IUP @ 10 1/7 weeks  2. unplanned, undesired pregnancy Daniella Noland

## 2023-07-12 NOTE — BRIEF OPERATIVE NOTE - NSICDXBRIEFPOSTOP_GEN_ALL_CORE_FT
POST-OP DIAGNOSIS:  Type 1 diabetes 12-Jul-2023 09:57:16  Daniella Noland  Unplanned unwanted pregnancy 12-Jul-2023 09:57:23 1. IUP @ 10 1/7 weeks  2. unplanned, undesired pregnancy Daniella Noland

## 2023-07-12 NOTE — ASU DISCHARGE PLAN (ADULT/PEDIATRIC) - CARE PROVIDER_API CALL
Daniella Noland  Obstetrics and Gynecology  [address], [address]  [place] [address]  Phone: [phone]  Fax: [phone]  Established Patient  Follow Up Time:

## 2023-07-12 NOTE — ASU PATIENT PROFILE, ADULT - FALL HARM RISK - UNIVERSAL INTERVENTIONS
Bed in lowest position, wheels locked, appropriate side rails in place/Call bell, personal items and telephone in reach/Instruct patient to call for assistance before getting out of bed or chair/Non-slip footwear when patient is out of bed/[place] to call system/Physically safe environment - no spills, clutter or unnecessary equipment/Purposeful Proactive Rounding/Room/bathroom lighting operational, light cord in reach

## 2023-07-12 NOTE — BRIEF OPERATIVE NOTE - OPERATION/FINDINGS
anteverted uterus approximately 9 weeks size.  gestational sac and villi approximately 9 weeks size.  BT: B+

## 2023-07-12 NOTE — PRE-ANESTHESIA EVALUATION ADULT - NSANTHOBSERVEDRD_ENT_A_CORE
No 71 y/o gentleman with h/o HTN (age 44), DM on insulin (age 44), gout, anxiety, depression, OCD and CKD since 2016.  He started HD via L AVF  in 2020 and transitioned to PD about 5 months ago. Pt now s/p   donor renal transplant on 3/9.

## 2023-07-12 NOTE — ASU DISCHARGE PLAN (ADULT/PEDIATRIC) - NURSING INSTRUCTIONS
You can take Tylenol (Acetaminophen) every 6 hours and Motrin (Ibuprofen) every 6 hours, as needed. You may alternate these medications such that you take one or the other every 3 hours for around the clock pain coverage. Do not exceed 4000mg of Tylenol (Acetaminophen) daily. You received Tylenol in the OR at 9:30AM. The next time you can take Tylenol is at 3:30PM. You also received Toradol in the OR at 10AM. The next time you can take Ibuprofen is at 4PM.

## 2023-07-13 ENCOUNTER — NON-APPOINTMENT (OUTPATIENT)
Age: 23
End: 2023-07-13

## 2023-07-14 ENCOUNTER — NON-APPOINTMENT (OUTPATIENT)
Age: 23
End: 2023-07-14

## 2023-07-20 LAB — SURGICAL PATHOLOGY STUDY: SIGNIFICANT CHANGE UP

## 2023-07-24 ENCOUNTER — APPOINTMENT (OUTPATIENT)
Dept: ENDOCRINOLOGY | Facility: CLINIC | Age: 23
End: 2023-07-24

## 2023-07-31 ENCOUNTER — APPOINTMENT (OUTPATIENT)
Dept: OBGYN | Facility: CLINIC | Age: 23
End: 2023-07-31

## 2023-09-11 NOTE — END OF VISIT
[>50% of Time Spent on Coordination of Care for  ___] : Greater than 50% of the encounter time was spent on coordination of care for [unfilled] [Time Spent: ___ minutes] : I have spent [unfilled] minutes of face to face time with the patient Xelpatriciaz Pregnancy And Lactation Text: This medication is Pregnancy Category D and is not considered safe during pregnancy.  The risk during breast feeding is also uncertain.

## 2023-11-13 RX ORDER — INSULIN LISPRO 100 [IU]/ML
100 INJECTION, SOLUTION INTRAVENOUS; SUBCUTANEOUS
Qty: 1 | Refills: 0 | Status: ACTIVE | COMMUNITY
Start: 2023-01-31 | End: 1900-01-01

## 2023-11-13 NOTE — PROGRESS NOTE ADULT - NUTRITIONAL ASSESSMENT
Diet, Regular:   Consistent Carbohydrate {Evening Snack} (CSTCHOSN) (10-28-21 @ 03:15) [Active]    Needs routine RD consult.
Diet, Regular:   Consistent Carbohydrate {Evening Snack} (CSTCHOSN) (10-28-21 @ 03:15) [Active]    Needs routine RD consult.
Acuity of illness

## 2023-12-06 ENCOUNTER — EMERGENCY (EMERGENCY)
Facility: HOSPITAL | Age: 23
LOS: 1 days | Discharge: LEFT BEFORE DISCHARGE | End: 2023-12-06
Attending: EMERGENCY MEDICINE
Payer: COMMERCIAL

## 2023-12-06 VITALS
SYSTOLIC BLOOD PRESSURE: 120 MMHG | RESPIRATION RATE: 20 BRPM | HEART RATE: 92 BPM | TEMPERATURE: 97 F | DIASTOLIC BLOOD PRESSURE: 83 MMHG | OXYGEN SATURATION: 99 % | HEIGHT: 61 IN | WEIGHT: 115.08 LBS

## 2023-12-06 VITALS
TEMPERATURE: 98 F | DIASTOLIC BLOOD PRESSURE: 89 MMHG | HEART RATE: 87 BPM | OXYGEN SATURATION: 98 % | SYSTOLIC BLOOD PRESSURE: 120 MMHG | RESPIRATION RATE: 21 BRPM

## 2023-12-06 DIAGNOSIS — Z98.890 OTHER SPECIFIED POSTPROCEDURAL STATES: Chronic | ICD-10-CM

## 2023-12-06 LAB
ALBUMIN SERPL ELPH-MCNC: 4.7 G/DL — SIGNIFICANT CHANGE UP (ref 3.3–5)
ALBUMIN SERPL ELPH-MCNC: 4.7 G/DL — SIGNIFICANT CHANGE UP (ref 3.3–5)
ALP SERPL-CCNC: 136 U/L — HIGH (ref 40–120)
ALP SERPL-CCNC: 136 U/L — HIGH (ref 40–120)
ALT FLD-CCNC: 17 U/L — SIGNIFICANT CHANGE UP (ref 10–45)
ALT FLD-CCNC: 17 U/L — SIGNIFICANT CHANGE UP (ref 10–45)
ANION GAP SERPL CALC-SCNC: 15 MMOL/L — SIGNIFICANT CHANGE UP (ref 5–17)
ANION GAP SERPL CALC-SCNC: 15 MMOL/L — SIGNIFICANT CHANGE UP (ref 5–17)
ANION GAP SERPL CALC-SCNC: 18 MMOL/L — HIGH (ref 5–17)
ANION GAP SERPL CALC-SCNC: 18 MMOL/L — HIGH (ref 5–17)
ANION GAP SERPL CALC-SCNC: 19 MMOL/L — HIGH (ref 5–17)
ANION GAP SERPL CALC-SCNC: 19 MMOL/L — HIGH (ref 5–17)
AST SERPL-CCNC: 14 U/L — SIGNIFICANT CHANGE UP (ref 10–40)
AST SERPL-CCNC: 14 U/L — SIGNIFICANT CHANGE UP (ref 10–40)
B-OH-BUTYR SERPL-SCNC: 3.9 MMOL/L — HIGH
B-OH-BUTYR SERPL-SCNC: 3.9 MMOL/L — HIGH
BASE EXCESS BLDV CALC-SCNC: -1.5 MMOL/L — SIGNIFICANT CHANGE UP (ref -2–3)
BASE EXCESS BLDV CALC-SCNC: -2.2 MMOL/L — LOW (ref -2–3)
BASE EXCESS BLDV CALC-SCNC: -2.2 MMOL/L — LOW (ref -2–3)
BASOPHILS # BLD AUTO: 0.03 K/UL — SIGNIFICANT CHANGE UP (ref 0–0.2)
BASOPHILS # BLD AUTO: 0.03 K/UL — SIGNIFICANT CHANGE UP (ref 0–0.2)
BASOPHILS NFR BLD AUTO: 0.5 % — SIGNIFICANT CHANGE UP (ref 0–2)
BASOPHILS NFR BLD AUTO: 0.5 % — SIGNIFICANT CHANGE UP (ref 0–2)
BILIRUB SERPL-MCNC: 0.3 MG/DL — SIGNIFICANT CHANGE UP (ref 0.2–1.2)
BILIRUB SERPL-MCNC: 0.3 MG/DL — SIGNIFICANT CHANGE UP (ref 0.2–1.2)
BUN SERPL-MCNC: 11 MG/DL — SIGNIFICANT CHANGE UP (ref 7–23)
BUN SERPL-MCNC: 11 MG/DL — SIGNIFICANT CHANGE UP (ref 7–23)
BUN SERPL-MCNC: 13 MG/DL — SIGNIFICANT CHANGE UP (ref 7–23)
BUN SERPL-MCNC: 13 MG/DL — SIGNIFICANT CHANGE UP (ref 7–23)
BUN SERPL-MCNC: 9 MG/DL — SIGNIFICANT CHANGE UP (ref 7–23)
BUN SERPL-MCNC: 9 MG/DL — SIGNIFICANT CHANGE UP (ref 7–23)
CA-I SERPL-SCNC: 1.27 MMOL/L — SIGNIFICANT CHANGE UP (ref 1.15–1.33)
CA-I SERPL-SCNC: 1.29 MMOL/L — SIGNIFICANT CHANGE UP (ref 1.15–1.33)
CA-I SERPL-SCNC: 1.29 MMOL/L — SIGNIFICANT CHANGE UP (ref 1.15–1.33)
CALCIUM SERPL-MCNC: 10 MG/DL — SIGNIFICANT CHANGE UP (ref 8.4–10.5)
CALCIUM SERPL-MCNC: 10 MG/DL — SIGNIFICANT CHANGE UP (ref 8.4–10.5)
CALCIUM SERPL-MCNC: 9.4 MG/DL — SIGNIFICANT CHANGE UP (ref 8.4–10.5)
CALCIUM SERPL-MCNC: 9.4 MG/DL — SIGNIFICANT CHANGE UP (ref 8.4–10.5)
CALCIUM SERPL-MCNC: 9.6 MG/DL — SIGNIFICANT CHANGE UP (ref 8.4–10.5)
CALCIUM SERPL-MCNC: 9.6 MG/DL — SIGNIFICANT CHANGE UP (ref 8.4–10.5)
CHLORIDE BLDV-SCNC: 96 MMOL/L — SIGNIFICANT CHANGE UP (ref 96–108)
CHLORIDE BLDV-SCNC: 98 MMOL/L — SIGNIFICANT CHANGE UP (ref 96–108)
CHLORIDE BLDV-SCNC: 98 MMOL/L — SIGNIFICANT CHANGE UP (ref 96–108)
CHLORIDE SERPL-SCNC: 88 MMOL/L — LOW (ref 96–108)
CHLORIDE SERPL-SCNC: 88 MMOL/L — LOW (ref 96–108)
CHLORIDE SERPL-SCNC: 95 MMOL/L — LOW (ref 96–108)
CHLORIDE SERPL-SCNC: 95 MMOL/L — LOW (ref 96–108)
CHLORIDE SERPL-SCNC: 96 MMOL/L — SIGNIFICANT CHANGE UP (ref 96–108)
CHLORIDE SERPL-SCNC: 96 MMOL/L — SIGNIFICANT CHANGE UP (ref 96–108)
CO2 BLDV-SCNC: 26 MMOL/L — SIGNIFICANT CHANGE UP (ref 22–26)
CO2 BLDV-SCNC: 26 MMOL/L — SIGNIFICANT CHANGE UP (ref 22–26)
CO2 BLDV-SCNC: 27 MMOL/L — HIGH (ref 22–26)
CO2 BLDV-SCNC: 27 MMOL/L — HIGH (ref 22–26)
CO2 BLDV-SCNC: 30 MMOL/L — HIGH (ref 22–26)
CO2 BLDV-SCNC: 30 MMOL/L — HIGH (ref 22–26)
CO2 SERPL-SCNC: 21 MMOL/L — LOW (ref 22–31)
CO2 SERPL-SCNC: 21 MMOL/L — LOW (ref 22–31)
CO2 SERPL-SCNC: 22 MMOL/L — SIGNIFICANT CHANGE UP (ref 22–31)
CO2 SERPL-SCNC: 22 MMOL/L — SIGNIFICANT CHANGE UP (ref 22–31)
CO2 SERPL-SCNC: 26 MMOL/L — SIGNIFICANT CHANGE UP (ref 22–31)
CO2 SERPL-SCNC: 26 MMOL/L — SIGNIFICANT CHANGE UP (ref 22–31)
CREAT SERPL-MCNC: 0.47 MG/DL — LOW (ref 0.5–1.3)
CREAT SERPL-MCNC: 0.47 MG/DL — LOW (ref 0.5–1.3)
CREAT SERPL-MCNC: 0.53 MG/DL — SIGNIFICANT CHANGE UP (ref 0.5–1.3)
CREAT SERPL-MCNC: 0.53 MG/DL — SIGNIFICANT CHANGE UP (ref 0.5–1.3)
CREAT SERPL-MCNC: 0.56 MG/DL — SIGNIFICANT CHANGE UP (ref 0.5–1.3)
CREAT SERPL-MCNC: 0.56 MG/DL — SIGNIFICANT CHANGE UP (ref 0.5–1.3)
EGFR: 132 ML/MIN/1.73M2 — SIGNIFICANT CHANGE UP
EGFR: 132 ML/MIN/1.73M2 — SIGNIFICANT CHANGE UP
EGFR: 134 ML/MIN/1.73M2 — SIGNIFICANT CHANGE UP
EGFR: 134 ML/MIN/1.73M2 — SIGNIFICANT CHANGE UP
EGFR: 138 ML/MIN/1.73M2 — SIGNIFICANT CHANGE UP
EGFR: 138 ML/MIN/1.73M2 — SIGNIFICANT CHANGE UP
EOSINOPHIL # BLD AUTO: 0.02 K/UL — SIGNIFICANT CHANGE UP (ref 0–0.5)
EOSINOPHIL # BLD AUTO: 0.02 K/UL — SIGNIFICANT CHANGE UP (ref 0–0.5)
EOSINOPHIL NFR BLD AUTO: 0.4 % — SIGNIFICANT CHANGE UP (ref 0–6)
EOSINOPHIL NFR BLD AUTO: 0.4 % — SIGNIFICANT CHANGE UP (ref 0–6)
GAS PNL BLDV: 132 MMOL/L — LOW (ref 136–145)
GAS PNL BLDV: 132 MMOL/L — LOW (ref 136–145)
GAS PNL BLDV: 134 MMOL/L — LOW (ref 136–145)
GAS PNL BLDV: 134 MMOL/L — LOW (ref 136–145)
GAS PNL BLDV: 135 MMOL/L — LOW (ref 136–145)
GAS PNL BLDV: 135 MMOL/L — LOW (ref 136–145)
GAS PNL BLDV: SIGNIFICANT CHANGE UP
GLUCOSE BLDV-MCNC: 118 MG/DL — HIGH (ref 70–99)
GLUCOSE BLDV-MCNC: 118 MG/DL — HIGH (ref 70–99)
GLUCOSE BLDV-MCNC: 247 MG/DL — HIGH (ref 70–99)
GLUCOSE BLDV-MCNC: 247 MG/DL — HIGH (ref 70–99)
GLUCOSE BLDV-MCNC: 75 MG/DL — SIGNIFICANT CHANGE UP (ref 70–99)
GLUCOSE BLDV-MCNC: 75 MG/DL — SIGNIFICANT CHANGE UP (ref 70–99)
GLUCOSE SERPL-MCNC: 235 MG/DL — HIGH (ref 70–99)
GLUCOSE SERPL-MCNC: 235 MG/DL — HIGH (ref 70–99)
GLUCOSE SERPL-MCNC: 481 MG/DL — CRITICAL HIGH (ref 70–99)
GLUCOSE SERPL-MCNC: 481 MG/DL — CRITICAL HIGH (ref 70–99)
GLUCOSE SERPL-MCNC: 76 MG/DL — SIGNIFICANT CHANGE UP (ref 70–99)
GLUCOSE SERPL-MCNC: 76 MG/DL — SIGNIFICANT CHANGE UP (ref 70–99)
HCG SERPL-ACNC: <2 MIU/ML — SIGNIFICANT CHANGE UP
HCG SERPL-ACNC: <2 MIU/ML — SIGNIFICANT CHANGE UP
HCO3 BLDV-SCNC: 25 MMOL/L — SIGNIFICANT CHANGE UP (ref 22–29)
HCO3 BLDV-SCNC: 28 MMOL/L — SIGNIFICANT CHANGE UP (ref 22–29)
HCO3 BLDV-SCNC: 28 MMOL/L — SIGNIFICANT CHANGE UP (ref 22–29)
HCT VFR BLD CALC: 36.9 % — SIGNIFICANT CHANGE UP (ref 34.5–45)
HCT VFR BLD CALC: 36.9 % — SIGNIFICANT CHANGE UP (ref 34.5–45)
HCT VFR BLDA CALC: 33 % — LOW (ref 34.5–46.5)
HCT VFR BLDA CALC: 33 % — LOW (ref 34.5–46.5)
HCT VFR BLDA CALC: 41 % — SIGNIFICANT CHANGE UP (ref 34.5–46.5)
HCT VFR BLDA CALC: 41 % — SIGNIFICANT CHANGE UP (ref 34.5–46.5)
HCT VFR BLDA CALC: 42 % — SIGNIFICANT CHANGE UP (ref 34.5–46.5)
HCT VFR BLDA CALC: 42 % — SIGNIFICANT CHANGE UP (ref 34.5–46.5)
HGB BLD CALC-MCNC: 11.1 G/DL — LOW (ref 11.7–16.1)
HGB BLD CALC-MCNC: 11.1 G/DL — LOW (ref 11.7–16.1)
HGB BLD CALC-MCNC: 13.7 G/DL — SIGNIFICANT CHANGE UP (ref 11.7–16.1)
HGB BLD CALC-MCNC: 13.7 G/DL — SIGNIFICANT CHANGE UP (ref 11.7–16.1)
HGB BLD CALC-MCNC: 13.9 G/DL — SIGNIFICANT CHANGE UP (ref 11.7–16.1)
HGB BLD CALC-MCNC: 13.9 G/DL — SIGNIFICANT CHANGE UP (ref 11.7–16.1)
HGB BLD-MCNC: 12.5 G/DL — SIGNIFICANT CHANGE UP (ref 11.5–15.5)
HGB BLD-MCNC: 12.5 G/DL — SIGNIFICANT CHANGE UP (ref 11.5–15.5)
IMM GRANULOCYTES NFR BLD AUTO: 0.2 % — SIGNIFICANT CHANGE UP (ref 0–0.9)
IMM GRANULOCYTES NFR BLD AUTO: 0.2 % — SIGNIFICANT CHANGE UP (ref 0–0.9)
LACTATE BLDV-MCNC: 5.3 MMOL/L — CRITICAL HIGH (ref 0.5–2)
LACTATE BLDV-MCNC: 5.3 MMOL/L — CRITICAL HIGH (ref 0.5–2)
LACTATE BLDV-MCNC: 5.5 MMOL/L — CRITICAL HIGH (ref 0.5–2)
LACTATE BLDV-MCNC: 5.5 MMOL/L — CRITICAL HIGH (ref 0.5–2)
LACTATE BLDV-MCNC: 6.6 MMOL/L — CRITICAL HIGH (ref 0.5–2)
LACTATE BLDV-MCNC: 6.6 MMOL/L — CRITICAL HIGH (ref 0.5–2)
LYMPHOCYTES # BLD AUTO: 2.49 K/UL — SIGNIFICANT CHANGE UP (ref 1–3.3)
LYMPHOCYTES # BLD AUTO: 2.49 K/UL — SIGNIFICANT CHANGE UP (ref 1–3.3)
LYMPHOCYTES # BLD AUTO: 43.8 % — SIGNIFICANT CHANGE UP (ref 13–44)
LYMPHOCYTES # BLD AUTO: 43.8 % — SIGNIFICANT CHANGE UP (ref 13–44)
MAGNESIUM SERPL-MCNC: 1.6 MG/DL — SIGNIFICANT CHANGE UP (ref 1.6–2.6)
MAGNESIUM SERPL-MCNC: 1.6 MG/DL — SIGNIFICANT CHANGE UP (ref 1.6–2.6)
MCHC RBC-ENTMCNC: 32.4 PG — SIGNIFICANT CHANGE UP (ref 27–34)
MCHC RBC-ENTMCNC: 32.4 PG — SIGNIFICANT CHANGE UP (ref 27–34)
MCHC RBC-ENTMCNC: 33.9 GM/DL — SIGNIFICANT CHANGE UP (ref 32–36)
MCHC RBC-ENTMCNC: 33.9 GM/DL — SIGNIFICANT CHANGE UP (ref 32–36)
MCV RBC AUTO: 95.6 FL — SIGNIFICANT CHANGE UP (ref 80–100)
MCV RBC AUTO: 95.6 FL — SIGNIFICANT CHANGE UP (ref 80–100)
MONOCYTES # BLD AUTO: 0.3 K/UL — SIGNIFICANT CHANGE UP (ref 0–0.9)
MONOCYTES # BLD AUTO: 0.3 K/UL — SIGNIFICANT CHANGE UP (ref 0–0.9)
MONOCYTES NFR BLD AUTO: 5.3 % — SIGNIFICANT CHANGE UP (ref 2–14)
MONOCYTES NFR BLD AUTO: 5.3 % — SIGNIFICANT CHANGE UP (ref 2–14)
NEUTROPHILS # BLD AUTO: 2.84 K/UL — SIGNIFICANT CHANGE UP (ref 1.8–7.4)
NEUTROPHILS # BLD AUTO: 2.84 K/UL — SIGNIFICANT CHANGE UP (ref 1.8–7.4)
NEUTROPHILS NFR BLD AUTO: 49.8 % — SIGNIFICANT CHANGE UP (ref 43–77)
NEUTROPHILS NFR BLD AUTO: 49.8 % — SIGNIFICANT CHANGE UP (ref 43–77)
NRBC # BLD: 0 /100 WBCS — SIGNIFICANT CHANGE UP (ref 0–0)
NRBC # BLD: 0 /100 WBCS — SIGNIFICANT CHANGE UP (ref 0–0)
PCO2 BLDV: 49 MMHG — HIGH (ref 39–42)
PCO2 BLDV: 49 MMHG — HIGH (ref 39–42)
PCO2 BLDV: 50 MMHG — HIGH (ref 39–42)
PCO2 BLDV: 50 MMHG — HIGH (ref 39–42)
PCO2 BLDV: 68 MMHG — HIGH (ref 39–42)
PCO2 BLDV: 68 MMHG — HIGH (ref 39–42)
PH BLDV: 7.22 — LOW (ref 7.32–7.43)
PH BLDV: 7.22 — LOW (ref 7.32–7.43)
PH BLDV: 7.3 — LOW (ref 7.32–7.43)
PH BLDV: 7.3 — LOW (ref 7.32–7.43)
PH BLDV: 7.32 — SIGNIFICANT CHANGE UP (ref 7.32–7.43)
PH BLDV: 7.32 — SIGNIFICANT CHANGE UP (ref 7.32–7.43)
PHOSPHATE SERPL-MCNC: 1.7 MG/DL — LOW (ref 2.5–4.5)
PHOSPHATE SERPL-MCNC: 1.7 MG/DL — LOW (ref 2.5–4.5)
PLATELET # BLD AUTO: 365 K/UL — SIGNIFICANT CHANGE UP (ref 150–400)
PLATELET # BLD AUTO: 365 K/UL — SIGNIFICANT CHANGE UP (ref 150–400)
PO2 BLDV: 25 MMHG — SIGNIFICANT CHANGE UP (ref 25–45)
PO2 BLDV: 25 MMHG — SIGNIFICANT CHANGE UP (ref 25–45)
PO2 BLDV: 31 MMHG — SIGNIFICANT CHANGE UP (ref 25–45)
PO2 BLDV: 31 MMHG — SIGNIFICANT CHANGE UP (ref 25–45)
PO2 BLDV: 32 MMHG — SIGNIFICANT CHANGE UP (ref 25–45)
PO2 BLDV: 32 MMHG — SIGNIFICANT CHANGE UP (ref 25–45)
POTASSIUM BLDV-SCNC: 2.9 MMOL/L — CRITICAL LOW (ref 3.5–5.1)
POTASSIUM BLDV-SCNC: 2.9 MMOL/L — CRITICAL LOW (ref 3.5–5.1)
POTASSIUM BLDV-SCNC: 3.8 MMOL/L — SIGNIFICANT CHANGE UP (ref 3.5–5.1)
POTASSIUM BLDV-SCNC: 3.8 MMOL/L — SIGNIFICANT CHANGE UP (ref 3.5–5.1)
POTASSIUM BLDV-SCNC: 4.1 MMOL/L — SIGNIFICANT CHANGE UP (ref 3.5–5.1)
POTASSIUM BLDV-SCNC: 4.1 MMOL/L — SIGNIFICANT CHANGE UP (ref 3.5–5.1)
POTASSIUM SERPL-MCNC: 3.2 MMOL/L — LOW (ref 3.5–5.3)
POTASSIUM SERPL-MCNC: 3.2 MMOL/L — LOW (ref 3.5–5.3)
POTASSIUM SERPL-MCNC: 4 MMOL/L — SIGNIFICANT CHANGE UP (ref 3.5–5.3)
POTASSIUM SERPL-MCNC: 4 MMOL/L — SIGNIFICANT CHANGE UP (ref 3.5–5.3)
POTASSIUM SERPL-MCNC: 5.1 MMOL/L — SIGNIFICANT CHANGE UP (ref 3.5–5.3)
POTASSIUM SERPL-MCNC: 5.1 MMOL/L — SIGNIFICANT CHANGE UP (ref 3.5–5.3)
POTASSIUM SERPL-SCNC: 3.2 MMOL/L — LOW (ref 3.5–5.3)
POTASSIUM SERPL-SCNC: 3.2 MMOL/L — LOW (ref 3.5–5.3)
POTASSIUM SERPL-SCNC: 4 MMOL/L — SIGNIFICANT CHANGE UP (ref 3.5–5.3)
POTASSIUM SERPL-SCNC: 4 MMOL/L — SIGNIFICANT CHANGE UP (ref 3.5–5.3)
POTASSIUM SERPL-SCNC: 5.1 MMOL/L — SIGNIFICANT CHANGE UP (ref 3.5–5.3)
POTASSIUM SERPL-SCNC: 5.1 MMOL/L — SIGNIFICANT CHANGE UP (ref 3.5–5.3)
PROT SERPL-MCNC: 7.7 G/DL — SIGNIFICANT CHANGE UP (ref 6–8.3)
PROT SERPL-MCNC: 7.7 G/DL — SIGNIFICANT CHANGE UP (ref 6–8.3)
RAPID RVP RESULT: SIGNIFICANT CHANGE UP
RAPID RVP RESULT: SIGNIFICANT CHANGE UP
RBC # BLD: 3.86 M/UL — SIGNIFICANT CHANGE UP (ref 3.8–5.2)
RBC # BLD: 3.86 M/UL — SIGNIFICANT CHANGE UP (ref 3.8–5.2)
RBC # FLD: 11.8 % — SIGNIFICANT CHANGE UP (ref 10.3–14.5)
RBC # FLD: 11.8 % — SIGNIFICANT CHANGE UP (ref 10.3–14.5)
SAO2 % BLDV: 20.5 % — LOW (ref 67–88)
SAO2 % BLDV: 20.5 % — LOW (ref 67–88)
SAO2 % BLDV: 29.1 % — LOW (ref 67–88)
SAO2 % BLDV: 29.1 % — LOW (ref 67–88)
SAO2 % BLDV: 31.3 % — LOW (ref 67–88)
SAO2 % BLDV: 31.3 % — LOW (ref 67–88)
SARS-COV-2 RNA SPEC QL NAA+PROBE: SIGNIFICANT CHANGE UP
SARS-COV-2 RNA SPEC QL NAA+PROBE: SIGNIFICANT CHANGE UP
SODIUM SERPL-SCNC: 128 MMOL/L — LOW (ref 135–145)
SODIUM SERPL-SCNC: 128 MMOL/L — LOW (ref 135–145)
SODIUM SERPL-SCNC: 135 MMOL/L — SIGNIFICANT CHANGE UP (ref 135–145)
SODIUM SERPL-SCNC: 135 MMOL/L — SIGNIFICANT CHANGE UP (ref 135–145)
SODIUM SERPL-SCNC: 137 MMOL/L — SIGNIFICANT CHANGE UP (ref 135–145)
SODIUM SERPL-SCNC: 137 MMOL/L — SIGNIFICANT CHANGE UP (ref 135–145)
WBC # BLD: 5.69 K/UL — SIGNIFICANT CHANGE UP (ref 3.8–10.5)
WBC # BLD: 5.69 K/UL — SIGNIFICANT CHANGE UP (ref 3.8–10.5)
WBC # FLD AUTO: 5.69 K/UL — SIGNIFICANT CHANGE UP (ref 3.8–10.5)
WBC # FLD AUTO: 5.69 K/UL — SIGNIFICANT CHANGE UP (ref 3.8–10.5)

## 2023-12-06 PROCEDURE — 99285 EMERGENCY DEPT VISIT HI MDM: CPT | Mod: GC

## 2023-12-06 PROCEDURE — 80053 COMPREHEN METABOLIC PANEL: CPT

## 2023-12-06 PROCEDURE — 80048 BASIC METABOLIC PNL TOTAL CA: CPT

## 2023-12-06 PROCEDURE — 93005 ELECTROCARDIOGRAM TRACING: CPT

## 2023-12-06 PROCEDURE — 82330 ASSAY OF CALCIUM: CPT

## 2023-12-06 PROCEDURE — 84295 ASSAY OF SERUM SODIUM: CPT

## 2023-12-06 PROCEDURE — 0225U NFCT DS DNA&RNA 21 SARSCOV2: CPT

## 2023-12-06 PROCEDURE — 84132 ASSAY OF SERUM POTASSIUM: CPT

## 2023-12-06 PROCEDURE — 82803 BLOOD GASES ANY COMBINATION: CPT

## 2023-12-06 PROCEDURE — 84702 CHORIONIC GONADOTROPIN TEST: CPT

## 2023-12-06 PROCEDURE — 83735 ASSAY OF MAGNESIUM: CPT

## 2023-12-06 PROCEDURE — 82947 ASSAY GLUCOSE BLOOD QUANT: CPT

## 2023-12-06 PROCEDURE — 85025 COMPLETE CBC W/AUTO DIFF WBC: CPT

## 2023-12-06 PROCEDURE — 85018 HEMOGLOBIN: CPT

## 2023-12-06 PROCEDURE — 96374 THER/PROPH/DIAG INJ IV PUSH: CPT

## 2023-12-06 PROCEDURE — 82435 ASSAY OF BLOOD CHLORIDE: CPT

## 2023-12-06 PROCEDURE — 84100 ASSAY OF PHOSPHORUS: CPT

## 2023-12-06 PROCEDURE — 99285 EMERGENCY DEPT VISIT HI MDM: CPT

## 2023-12-06 PROCEDURE — 82962 GLUCOSE BLOOD TEST: CPT

## 2023-12-06 PROCEDURE — 82010 KETONE BODYS QUAN: CPT

## 2023-12-06 PROCEDURE — 36415 COLL VENOUS BLD VENIPUNCTURE: CPT

## 2023-12-06 PROCEDURE — 83605 ASSAY OF LACTIC ACID: CPT

## 2023-12-06 PROCEDURE — 99284 EMERGENCY DEPT VISIT MOD MDM: CPT | Mod: 25

## 2023-12-06 PROCEDURE — 85014 HEMATOCRIT: CPT

## 2023-12-06 RX ORDER — INSULIN LISPRO 100/ML
5 VIAL (ML) SUBCUTANEOUS ONCE
Refills: 0 | Status: COMPLETED | OUTPATIENT
Start: 2023-12-06 | End: 2023-12-06

## 2023-12-06 RX ORDER — DEXTROSE MONOHYDRATE, SODIUM CHLORIDE, AND POTASSIUM CHLORIDE 50; .745; 4.5 G/1000ML; G/1000ML; G/1000ML
1000 INJECTION, SOLUTION INTRAVENOUS
Refills: 0 | Status: DISCONTINUED | OUTPATIENT
Start: 2023-12-06 | End: 2023-12-09

## 2023-12-06 RX ORDER — SODIUM CHLORIDE 9 MG/ML
1000 INJECTION, SOLUTION INTRAVENOUS ONCE
Refills: 0 | Status: COMPLETED | OUTPATIENT
Start: 2023-12-06 | End: 2023-12-06

## 2023-12-06 RX ORDER — ONDANSETRON 8 MG/1
4 TABLET, FILM COATED ORAL ONCE
Refills: 0 | Status: COMPLETED | OUTPATIENT
Start: 2023-12-06 | End: 2023-12-06

## 2023-12-06 RX ORDER — DEXTROSE MONOHYDRATE, SODIUM CHLORIDE, AND POTASSIUM CHLORIDE 50; .745; 4.5 G/1000ML; G/1000ML; G/1000ML
1000 INJECTION, SOLUTION INTRAVENOUS
Refills: 0 | Status: DISCONTINUED | OUTPATIENT
Start: 2023-12-06 | End: 2023-12-06

## 2023-12-06 RX ORDER — INSULIN HUMAN 100 [IU]/ML
5 INJECTION, SOLUTION SUBCUTANEOUS
Qty: 100 | Refills: 0 | Status: DISCONTINUED | OUTPATIENT
Start: 2023-12-06 | End: 2023-12-06

## 2023-12-06 RX ADMIN — ONDANSETRON 4 MILLIGRAM(S): 8 TABLET, FILM COATED ORAL at 12:28

## 2023-12-06 RX ADMIN — SODIUM CHLORIDE 1000 MILLILITER(S): 9 INJECTION, SOLUTION INTRAVENOUS at 12:28

## 2023-12-06 RX ADMIN — SODIUM CHLORIDE 1000 MILLILITER(S): 9 INJECTION, SOLUTION INTRAVENOUS at 13:48

## 2023-12-06 RX ADMIN — DEXTROSE MONOHYDRATE, SODIUM CHLORIDE, AND POTASSIUM CHLORIDE 150 MILLILITER(S): 50; .745; 4.5 INJECTION, SOLUTION INTRAVENOUS at 16:52

## 2023-12-06 RX ADMIN — SODIUM CHLORIDE 1000 MILLILITER(S): 9 INJECTION, SOLUTION INTRAVENOUS at 16:15

## 2023-12-06 NOTE — ED PROVIDER NOTE - NSFOLLOWUPINSTRUCTIONS_ED_ALL_ED_FT
You were seen in the emergency department for evaluation of a pregnancy test.  Your pregnancy test was negative.  Your blood sugar was very high here, where given IV fluids and we checked labs which were all normal.  This could be related to either a viral syndrome or elevation in your sugars.  Please keep up with your home insulin, please continue to check your blood sugars and make sure you maintain tight control of your blood sugars as directed by your primary care doctor.  Please call your primary care doctor for follow-up in 1 to 2 days. You were seen in the emergency department for evaluation of a pregnancy test and high blood sugar. We checked labs which showed a high blood sugar and signs ketones in the blood.  We gave you IV fluids and monitoring her electrolytes.  Your pregnancy test was negative.  Please follow-up with your endocrinologist in 1 to 2 days to discuss today's visit.

## 2023-12-06 NOTE — ED ADULT TRIAGE NOTE - CHIEF COMPLAINT QUOTE
Nausea/Vomiting x 2w. "feel like im having early pregnancy symptoms and I wanted to get ahead of it."  LMP 11/20  Hx DM1

## 2023-12-06 NOTE — ED ADULT NURSE NOTE - NSFALLUNIVINTERV_ED_ALL_ED
Bed/Stretcher in lowest position, wheels locked, appropriate side rails in place/Call bell, personal items and telephone in reach/Instruct patient to call for assistance before getting out of bed/chair/stretcher/Non-slip footwear applied when patient is off stretcher/[place] to call system/Physically safe environment - no spills, clutter or unnecessary equipment/Purposeful proactive rounding/Room/bathroom lighting operational, light cord in reach Bed/Stretcher in lowest position, wheels locked, appropriate side rails in place/Call bell, personal items and telephone in reach/Instruct patient to call for assistance before getting out of bed/chair/stretcher/Non-slip footwear applied when patient is off stretcher/[place] to call system/Physically safe environment - no spills, clutter or unnecessary equipment/Purposeful proactive rounding/Room/bathroom lighting operational, light cord in reach

## 2023-12-06 NOTE — ED PROVIDER NOTE - CLINICAL SUMMARY MEDICAL DECISION MAKING FREE TEXT BOX
Attending note.  Patient is concerned that she may be pregnant and would like to be tested.  She complains of nausea, breast tenderness.  She had abnormal period on November 17 and at menstruation on November 3.  She also took Plan B recently. Attending note.  Patient is concerned that she may be pregnant and would like to be tested.  She complains of nausea, breast tenderness.  She had abnormal period on November 17 and at menstruation on November 3.  She also took Plan B recently.    Andry (Fellow): Patient was concerned about pregnancy, urine pregnancy test was negative.  Offered beta-hCG blood draw, patient excepted this.  Patient also type I diabetic, sugars have been in the 200s at home overall well-controlled prior to the last few days.  Has been taking her Lantus.  Today feels more nauseous and vomiting.  Point-of-care glucose here is close to 500, patient overall very well-appearing doubtful of DKA but will check labs for further evaluation.  Will provide 1 L of IV fluids.  Will provide Zofran.

## 2023-12-06 NOTE — CONSULT NOTE ADULT - ASSESSMENT
24 yo F w/ PMHx of DM1 diagnosed in 2011, at age 11, on insulin pump (Omnipod, self regulating insulin pump; Humalog) s/p D&C 7/12/23 presenting to ED for nausea which started over the past 2 weeks.     #Lactic Acidosis, Hyperglycemia     24 yo F w/ PMHx of DM1 diagnosed in 2011, at age 11, on insulin pump (Omnipod, self regulating insulin pump; Humalog) s/p D&C 7/12/23 presenting to ED for nausea which started over the past 2 weeks.     #Lactic Acidosis  #Hyperglycemia w/ ketosis  #Type 1 DM  #Cough, nausea  -c/w continuous hydration w/ D5LR  -c/w electrolyte replacement  -recommend q4hr fingersticks for titration of D5  -advance diet as tolerated  -low ISS  -pt removed pump when she arrived to ED, recommend endo consult for management of continuous insulin or standing insulin regimen  -recommend infectious w/u as below to identify trigger of hyperglycemia w/ ketosis   -obtain full RVP  -obtain CXR    Omer Hanson, PGY-2  Internal Medicine  Note Incomplete Until Signed by Attending 24 yo F w/ PMHx of DM1 diagnosed in 2011, at age 11, on insulin pump (Omnipod, self regulating insulin pump; Humalog) s/p D&C 7/12/23 presenting to ED for nausea which started over the past 2 weeks.     #Lactic Acidosis  #Hyperglycemia w/ ketosis  #Type 1 DM  #Cough, nausea  -c/w continuous hydration w/ D5LR  -c/w electrolyte replacement  -recommend q4hr fingersticks for titration of D5  -advance diet as tolerated  -low ISS  -pt removed pump when she arrived to ED, recommend endo consult for management of continuous insulin or standing insulin regimen; have emphasized to patient/family/ER MD's that patient must have basal insulin to avoid DKA  -recommend infectious w/u as below to identify trigger of hyperglycemia w/ ketosis   -obtain full RVP  -obtain CXR, UA    Omer Hanson, PGY-2  Internal Medicine  Note Incomplete Until Signed by Attending 22 yo F w/ PMHx of DM1 diagnosed in 2011, at age 11, on insulin pump (Omnipod, self regulating insulin pump; Humalog) s/p D&C 7/12/23 presenting to ED for nausea which started over the past 2 weeks.     #Lactic Acidosis  #Hyperglycemia w/ ketosis  #Type 1 DM  #Cough, nausea  -c/w continuous hydration w/ D5LR  -c/w electrolyte replacement  -recommend q4hr fingersticks for titration of D5  -advance diet as tolerated  -low ISS  -pt removed pump when she arrived to ED, recommend endo consult for management of continuous insulin or standing insulin regimen; have emphasized to patient/family/ER MD's that patient must have basal insulin to avoid DKA  -recommend infectious w/u as below to identify trigger of hyperglycemia w/ ketosis   -obtain full RVP  -obtain CXR, UA    Omer Hanson, PGY-2  Internal Medicine  Note Incomplete Until Signed by Attending

## 2023-12-06 NOTE — ED ADULT NURSE NOTE - OBJECTIVE STATEMENT
Patient came to ED because she believes she is having early pregnancy symptoms like n/v x 2 weeks. Patient , LMP . Patient reports she has 3 episodes of vomiting per day. No abd pain, vaginal bleeding. Patient requesting pregnancy test. No signs of acute distress at this time. Patient A&Ox4, ambulatory. Plan of care in progress.

## 2023-12-06 NOTE — ED ADULT NURSE NOTE - SKIN TEMPERATURE
WBC 13 in setting of R hallux ulcer, likely osteomyelitis given TPB. ESR normal, CRP slightly elevated.   - see management above
warm

## 2023-12-06 NOTE — ED PROVIDER NOTE - PROGRESS NOTE DETAILS
Patient's labs do not show acidosis normal bicarb along the lower end, anion gap around 18, patient's repeat point-of-care 430, patient preferred to self administer 5 units of lispro which she did.  Will give another liter of IV fluids, will get repeat labs at 1 hour to ensure patient's gap not worsening, patient overall feeling well at this time, much improved after first liter. She called Dr. Lind her endocrinologist to set up follow up appointment. Patient's labs do not show acidosis normal bicarb along the lower end, anion gap around 18, patient's repeat point-of-care 430, patient preferred to self administer 5 units of lispro based on her sliding scale which she did.  Will give another liter of IV fluids, will get repeat labs at 1 hour to ensure patient's gap not worsening, patient overall feeling well at this time, much improved after first liter. She called Dr. Lind her endocrinologist to set up follow up appointment. Sodium corrects to 134. Labs showing opening gap, decreasing bicarb, lactate up to 5.5, called ICU and ordered insulin drip. Per ICU hold off on insulin drip, give more lantus, give more IVF.     Repeat POC 99, will start D5 maintenance, will give 5 units of lantus after sugar increase, another bolus ordered. Abdomen soft non-tender, no RUQ tenderness, feels no abdominal pain at all. Uncertain why lactate increasing, patient remains very well appearing overall. MICU evaluating. Spoke with endocrine:   ?  Cam[facility] Wilde;  ?  [facility] and starting 3 admelog premeal with low scale with meals and low scale at bedtime. Fingersticks with meals amd bedtime. If by low at bedtime (100) can do a 2am fingersticks. Hypoglycemia protocol prn. Most recent BG 78 from 400 after fluids and 5 admelog.    Patient at this time refusing the lantus because she would like to sign out AMA. She agrees to stay for remainder of her fluids will continue to implore patient to stay Spoke with endocrine:   ?  Campos Wilde;  ?  [facility] and starting 3 admelog premeal with low scale with meals and low scale at bedtime. Fingersticks with meals amd bedtime. If by low at bedtime (100) can do a 2am fingersticks. Hypoglycemia protocol prn. Most recent BG 78 from 400 after fluids and 5 admelog.    Patient at this time refusing the lantus because she would like to sign out AMA. She agrees to stay for remainder of her fluids will continue to implore patient to stay Spoke with endocrine:   [facility] now and starting 3 admelog premeal with low scale with meals and low scale at bedtime. Fingersticks with meals amd bedtime. If by low at bedtime (100) can do a 2am fingersticks. Hypoglycemia protocol prn. Most recent BG 78 from 400 after fluids and 5 admelog.    Patient at this time refusing the lantus because she would like to sign out AMA. She agrees to stay for remainder of her fluids will continue to implore patient to stay    Labs improving, K 3.2 but patient has potassium containing fluids running. Spoke with endocrine:   [facility] and starting 3 admelog premeal with low scale with meals and low scale at bedtime. Fingersticks with meals amd bedtime. If by low at bedtime (100) can do a 2am fingersticks. Hypoglycemia protocol prn. Most recent BG 78 from 400 after fluids and 5 admelog.    Patient at this time refusing the lantus because she would like to sign out AMA. She agrees to stay for remainder of her fluids will continue to implore patient to stay    Labs improving, K 3.2 but patient has potassium containing fluids running. Patient ate a full meal, received the remainder of most of her bolus but at this time is requesting to sign out AGAINST MEDICAL ADVICE.  Despite her labs correcting is still recommended admission to the hospital for close monitoring of her glucose and electrolytes, starting of Lantus as per endocrinology's recommendations.  And repeat labs in the morning.  I explained that signing out AGAINST MEDICAL ADVICE could lead to entrance into DKA which could be fatal or electrolyte or derangement which could be fatal.  Patient expressed understanding and has capacity to make decisions.  Therefore she was allowed to sign out AGAINST MEDICAL ADVICE.  I urged her to call her endocrinologist to ensure that she has an appointment tomorrow as she mentioned to me

## 2023-12-06 NOTE — ED PROVIDER NOTE - OBJECTIVE STATEMENT
Attending note.  Patient was seen in room #33 to the right.  Patient is concerned that she might be pregnant.  She is complaining of some nausea as well as breast tenderness.  She had an abnormal period on November 17 with a previous.  On November 3.  Patient also recently took Plan B.  She denies any vaginal bleeding or abdominal pain.

## 2023-12-06 NOTE — ED PROVIDER NOTE - PATIENT PORTAL LINK FT
You can access the FollowMyHealth Patient Portal offered by [facility] by registering at the following website: [email or web address]. By joining [employer]’s FollowMyHealth portal, you will also be able to view your health information using other applications (apps) compatible with our system. You can access the FollowMyHealth Patient Portal offered by Health system by registering at the following website: [email or web address]. By joining [employer]’s FollowMyHealth portal, you will also be able to view your health information using other applications (apps) compatible with our system.

## 2023-12-06 NOTE — CONSULT NOTE ADULT - ATTENDING COMMENTS
Patient seen and examined.  Agree with resident note as above.  Patient with hx as noted including DM1 on insulin pump who complains of subacute nausea and cough with vomiting today.  Patient's son also with recent cough.  Patient denies other localizing sx.  On arrival to ER patient with hyperglycemia and labs c/w ketosis but nonacidemic.  With hydration and bolus dose insulin ketosis resolving but now has a rising lactate.  Belly is benign, patient has stable vitals, no hx of heart or liver disease, no recent albuterol.      Recommend continued bolus hydration and trend lactate.  Recommend infectious workup, at least RVP/UA/CXR.  Needs endo recs for pump vs basal Lantus and for ISS and mealtime insulin recs.  Patient took off her pump.  Please ensure that patient resumes basal insulin.      No need for MICU at this time.  Rest of recs as above and I have edited as appropriate.

## 2023-12-06 NOTE — CONSULT NOTE ADULT - SUBJECTIVE AND OBJECTIVE BOX
CHIEF COMPLAINT: nausea    HPI:  22 yo F w/ PMHx of DM1 diagnosed in , at age 11, on insulin pump (Omnipod, self regulating insulin pump; Humalog) s/p D&C 23 presenting to ED for nausea which started over the past 2 weeks. Pt stated that today she vomited and has not eaten all day. She is able to keep water down. Denied diarrhea, abdominal pain or trying any new foods/restaurants recently. Pt also endorsed a cough which first started a month ago, faded away and then started again early this week. She stated that her son is sick w/ a cough at home. Pt denied running out of insulin or any issues with her insulin pump. Denied fever, chills, dysuria or urinary frequency.    In ED, pt received 5u admelog for fingersticks in 400s, with resolution of elevated fingersticks, as well as zofran and 2 L LR. Pt stated she feels much better, denied nausea. HCG negative. Pt w/ elevated lactate on VBG.      PAST MEDICAL & SURGICAL HISTORY:  Type 1 diabetes      History of induced       NVD (normal vaginal delivery)          FAMILY HISTORY:  No pertinent family history in first degree relatives    Family history of hypothyroidism (Mother)        SOCIAL HISTORY:      Allergies    Cipro (Vomiting)    Intolerances      HOME MEDICATIONS:  Home Medications:  omnipod insulin pump: Max basal rate set to 1.5 units. (2023 07:42)      REVIEW OF SYSTEMS:  Constitutional: [ ] negative [ ] fevers [ ] chills [ ] weight loss [ ] weight gain  HEENT: [ ] negative [ ] dry eyes [ ] eye irritation [ ] postnasal drip [ ] nasal congestion  CV: [ ] negative  [ ] chest pain [ ] orthopnea [ ] palpitations [ ] murmur  Resp: [ ] negative [x ] cough [ ] shortness of breath [ ] dyspnea [ ] wheezing [ ] sputum [ ] hemoptysis  GI: [ ] negative [ ] nausea [ ] vomiting [ ] diarrhea [ ] constipation [ ] abd pain [ ] dysphagia   : [ ] negative [ ] dysuria [ ] nocturia [ ] hematuria [ ] increased urinary frequency  Musculoskeletal: [ ] negative [ ] back pain [ ] myalgias [ ] arthralgias [ ] fracture  Skin: [ ] negative [ ] rash [ ] itch  Neurological: [ ] negative [ ] headache [ ] dizziness [ ] syncope [ ] weakness [ ] numbness  Psychiatric: [ ] negative [ ] anxiety [ ] depression  Endocrine: [ ] negative [ ] diabetes [ ] thyroid problem  Hematologic/Lymphatic: [ ] negative [ ] anemia [ ] bleeding problem  Allergic/Immunologic: [ ] negative [ ] itchy eyes [ ] nasal discharge [ ] hives [ ] angioedema  [ ] All other systems negative    OBJECTIVE:  ICU Vital Signs Last 24 Hrs  T(C): 36.3 (06 Dec 2023 10:36), Max: 36.3 (06 Dec 2023 10:36)  T(F): 97.4 (06 Dec 2023 10:36), Max: 97.4 (06 Dec 2023 10:36)  HR: 92 (06 Dec 2023 10:36) (92 - 92)  BP: 120/83 (06 Dec 2023 10:36) (120/83 - 120/83)  BP(mean): --  ABP: --  ABP(mean): --  RR: 20 (06 Dec 2023 10:36) (20 - 20)  SpO2: 99% (06 Dec 2023 10:36) (99% - 99%)    O2 Parameters below as of 06 Dec 2023 10:36  Patient On (Oxygen Delivery Method): room air              CAPILLARY BLOOD GLUCOSE      POCT Blood Glucose.: 99 mg/dL (06 Dec 2023 16:06)      PHYSICAL EXAM:  GENERAL: NAD, well-groomed, well-developed  HEAD:  Atraumatic, Normocephalic  EYES: Conjunctiva and sclera clear, no jaundice   ENMT: No tonsillar erythema, exudates, or enlargement; Moist mucous membranes  NECK: Supple, non tender, No JVD   HEART: Regular rate and rhythm; No murmurs, rubs, or gallops. S1/S2 present  RESPIRATORY: CTA B/L, No W/R/R  ABDOMEN: Soft, Nontender, Nondistended; Bowel sounds present.   NEUROLOGY: A&Ox3, nonfocal, moving all extremities  EXTREMITIES:  2+ Peripheral Pulses, No clubbing, cyanosis, or edema  SKIN: warm, dry, normal color, no rash or abnormal lesions        LINES:     HOSPITAL MEDICATIONS:  Standing Meds:  dextrose 5% + sodium chloride 0.9% with potassium chloride 40 mEq/L 1000 milliLiter(s) IV Continuous <Continuous>  insulin lispro Injectable (ADMELOG) 5 Unit(s) SubCutaneous Once      PRN Meds:      LABS:                        12.5   5.69  )-----------( 365      ( 06 Dec 2023 12:34 )             36.9     Hgb Trend: 12.5<--      135  |  95<L>  |  11  ----------------------------<  235<H>  4.0   |  21<L>  |  0.53    Ca    9.4      06 Dec 2023 15:04  Phos  1.7       Mg     1.6         TPro  7.7  /  Alb  4.7  /  TBili  0.3  /  DBili  x   /  AST  14  /  ALT  17  /  AlkPhos  136<H>      Creatinine Trend: 0.53<--, 0.56<--    Urinalysis Basic - ( 06 Dec 2023 15:04 )    Color: x / Appearance: x / SG: x / pH: x  Gluc: 235 mg/dL / Ketone: x  / Bili: x / Urobili: x   Blood: x / Protein: x / Nitrite: x   Leuk Esterase: x / RBC: x / WBC x   Sq Epi: x / Non Sq Epi: x / Bacteria: x        Venous Blood Gas:   @ 15:52  7.32/49/25/25/20.5  VBG Lactate: 6.6  Venous Blood Gas:   @ 14:50  7.30/50/31/25/31.3  VBG Lactate: 5.5  Venous Blood Gas:   @ 12:30  7.33/49/37/26/48.4  VBG Lactate: 1.5      MICROBIOLOGY:       RADIOLOGY:  [ ] Reviewed and interpreted by me    EKG: CHIEF COMPLAINT: nausea    HPI:  24 yo F w/ PMHx of DM1 diagnosed in , at age 11, on insulin pump (Omnipod, self regulating insulin pump; Humalog) s/p D&C 23 presenting to ED for nausea which started over the past 2 weeks. Pt stated that today she vomited and has not eaten all day. She is able to keep water down. Denied diarrhea, abdominal pain or trying any new foods/restaurants recently. Pt also endorsed a cough which first started a month ago, faded away and then started again early this week. She stated that her son is sick w/ a cough at home. Pt denied running out of insulin or any issues with her insulin pump. Denied fever, chills, dysuria or urinary frequency.    In ED, pt received 5u admelog for fingersticks in 400s, with resolution of elevated fingersticks, as well as zofran and 2 L LR. Pt stated she feels much better, denied nausea. HCG negative. Pt w/ elevated lactate on VBG.      PAST MEDICAL & SURGICAL HISTORY:  Type 1 diabetes      History of induced       NVD (normal vaginal delivery)          FAMILY HISTORY:  No pertinent family history in first degree relatives    Family history of hypothyroidism (Mother)        SOCIAL HISTORY:      Allergies    Cipro (Vomiting)    Intolerances      HOME MEDICATIONS:  Home Medications:  omnipod insulin pump: Max basal rate set to 1.5 units. (2023 07:42)      REVIEW OF SYSTEMS:  Constitutional: [ ] negative [ ] fevers [ ] chills [ ] weight loss [ ] weight gain  HEENT: [ ] negative [ ] dry eyes [ ] eye irritation [ ] postnasal drip [ ] nasal congestion  CV: [ ] negative  [ ] chest pain [ ] orthopnea [ ] palpitations [ ] murmur  Resp: [ ] negative [x ] cough [ ] shortness of breath [ ] dyspnea [ ] wheezing [ ] sputum [ ] hemoptysis  GI: [ ] negative [ ] nausea [ ] vomiting [ ] diarrhea [ ] constipation [ ] abd pain [ ] dysphagia   : [ ] negative [ ] dysuria [ ] nocturia [ ] hematuria [ ] increased urinary frequency  Musculoskeletal: [ ] negative [ ] back pain [ ] myalgias [ ] arthralgias [ ] fracture  Skin: [ ] negative [ ] rash [ ] itch  Neurological: [ ] negative [ ] headache [ ] dizziness [ ] syncope [ ] weakness [ ] numbness  Psychiatric: [ ] negative [ ] anxiety [ ] depression  Endocrine: [ ] negative [ ] diabetes [ ] thyroid problem  Hematologic/Lymphatic: [ ] negative [ ] anemia [ ] bleeding problem  Allergic/Immunologic: [ ] negative [ ] itchy eyes [ ] nasal discharge [ ] hives [ ] angioedema  [ ] All other systems negative    OBJECTIVE:  ICU Vital Signs Last 24 Hrs  T(C): 36.3 (06 Dec 2023 10:36), Max: 36.3 (06 Dec 2023 10:36)  T(F): 97.4 (06 Dec 2023 10:36), Max: 97.4 (06 Dec 2023 10:36)  HR: 92 (06 Dec 2023 10:36) (92 - 92)  BP: 120/83 (06 Dec 2023 10:36) (120/83 - 120/83)  BP(mean): --  ABP: --  ABP(mean): --  RR: 20 (06 Dec 2023 10:36) (20 - 20)  SpO2: 99% (06 Dec 2023 10:36) (99% - 99%)    O2 Parameters below as of 06 Dec 2023 10:36  Patient On (Oxygen Delivery Method): room air              CAPILLARY BLOOD GLUCOSE      POCT Blood Glucose.: 99 mg/dL (06 Dec 2023 16:06)      PHYSICAL EXAM:  GENERAL: NAD, well-groomed, well-developed  HEAD:  Atraumatic, Normocephalic  EYES: Conjunctiva and sclera clear, no jaundice   ENMT: No tonsillar erythema, exudates, or enlargement; Moist mucous membranes  NECK: Supple, non tender, No JVD   HEART: Regular rate and rhythm; No murmurs, rubs, or gallops. S1/S2 present  RESPIRATORY: CTA B/L, No W/R/R  ABDOMEN: Soft, Nontender, Nondistended; Bowel sounds present.   NEUROLOGY: A&Ox3, nonfocal, moving all extremities  EXTREMITIES:  2+ Peripheral Pulses, No clubbing, cyanosis, or edema  SKIN: warm, dry, normal color, no rash or abnormal lesions        LINES:     HOSPITAL MEDICATIONS:  Standing Meds:  dextrose 5% + sodium chloride 0.9% with potassium chloride 40 mEq/L 1000 milliLiter(s) IV Continuous <Continuous>  insulin lispro Injectable (ADMELOG) 5 Unit(s) SubCutaneous Once      PRN Meds:      LABS:                        12.5   5.69  )-----------( 365      ( 06 Dec 2023 12:34 )             36.9     Hgb Trend: 12.5<--      135  |  95<L>  |  11  ----------------------------<  235<H>  4.0   |  21<L>  |  0.53    Ca    9.4      06 Dec 2023 15:04  Phos  1.7       Mg     1.6         TPro  7.7  /  Alb  4.7  /  TBili  0.3  /  DBili  x   /  AST  14  /  ALT  17  /  AlkPhos  136<H>      Creatinine Trend: 0.53<--, 0.56<--    Urinalysis Basic - ( 06 Dec 2023 15:04 )    Color: x / Appearance: x / SG: x / pH: x  Gluc: 235 mg/dL / Ketone: x  / Bili: x / Urobili: x   Blood: x / Protein: x / Nitrite: x   Leuk Esterase: x / RBC: x / WBC x   Sq Epi: x / Non Sq Epi: x / Bacteria: x        Venous Blood Gas:   @ 15:52  7.32/49/25/25/20.5  VBG Lactate: 6.6  Venous Blood Gas:   @ 14:50  7.30/50/31/25/31.3  VBG Lactate: 5.5  Venous Blood Gas:   @ 12:30  7.33/49/37/26/48.4  VBG Lactate: 1.5      MICROBIOLOGY:       RADIOLOGY:  [ ] Reviewed and interpreted by me    EKG: CHIEF COMPLAINT: nausea    HPI:  22 yo F w/ PMHx of DM1 diagnosed in , at age 11, on insulin pump (Omnipod, self regulating insulin pump; Humalog) s/p D&C 23 presenting to ED for nausea which started over the past 2 weeks. Pt stated that today she vomited and has not eaten all day. She is able to keep water down. Denied diarrhea, abdominal pain or trying any new foods/restaurants recently. Pt also endorsed a cough which first started a month ago, faded away and then started again early this week. She stated that her son is sick w/ a cough at home. Pt denied running out of insulin or any issues with her insulin pump. Denied fever, chills, dysuria or urinary frequency.    In ED, pt received 5u admelog for fingersticks in 400s, with resolution of elevated fingersticks, as well as zofran and 2 L LR. Pt stated she feels much better, denied nausea, lightheadedness. HCG negative. Pt w/ elevated lactate on VBG.      PAST MEDICAL & SURGICAL HISTORY:  Type 1 diabetes      History of induced       NVD (normal vaginal delivery)          FAMILY HISTORY:  No pertinent family history in first degree relatives    Family history of hypothyroidism (Mother)        SOCIAL HISTORY:      Allergies    Cipro (Vomiting)    Intolerances      HOME MEDICATIONS:  Home Medications:  omnipod insulin pump: Max basal rate set to 1.5 units. (2023 07:42)      REVIEW OF SYSTEMS:  Constitutional: [ ] negative [ ] fevers [ ] chills [ ] weight loss [ ] weight gain  HEENT: [ ] negative [ ] dry eyes [ ] eye irritation [ ] postnasal drip [ ] nasal congestion  CV: [ ] negative  [ ] chest pain [ ] orthopnea [ ] palpitations [ ] murmur  Resp: [ ] negative [x ] cough [ ] shortness of breath [ ] dyspnea [ ] wheezing [ ] sputum [ ] hemoptysis  GI: [ ] negative [ ] nausea [ ] vomiting [ ] diarrhea [ ] constipation [ ] abd pain [ ] dysphagia   : [ ] negative [ ] dysuria [ ] nocturia [ ] hematuria [ ] increased urinary frequency  Musculoskeletal: [ ] negative [ ] back pain [ ] myalgias [ ] arthralgias [ ] fracture  Skin: [ ] negative [ ] rash [ ] itch  Neurological: [ ] negative [ ] headache [ ] dizziness [ ] syncope [ ] weakness [ ] numbness  Psychiatric: [ ] negative [ ] anxiety [ ] depression  Endocrine: [ ] negative [ ] diabetes [ ] thyroid problem  Hematologic/Lymphatic: [ ] negative [ ] anemia [ ] bleeding problem  Allergic/Immunologic: [ ] negative [ ] itchy eyes [ ] nasal discharge [ ] hives [ ] angioedema  [ ] All other systems negative    OBJECTIVE:  ICU Vital Signs Last 24 Hrs  T(C): 36.3 (06 Dec 2023 10:36), Max: 36.3 (06 Dec 2023 10:36)  T(F): 97.4 (06 Dec 2023 10:36), Max: 97.4 (06 Dec 2023 10:36)  HR: 92 (06 Dec 2023 10:36) (92 - 92)  BP: 120/83 (06 Dec 2023 10:36) (120/83 - 120/83)  BP(mean): --  ABP: --  ABP(mean): --  RR: 20 (06 Dec 2023 10:36) (20 - 20)  SpO2: 99% (06 Dec 2023 10:36) (99% - 99%)    O2 Parameters below as of 06 Dec 2023 10:36  Patient On (Oxygen Delivery Method): room air              CAPILLARY BLOOD GLUCOSE      POCT Blood Glucose.: 99 mg/dL (06 Dec 2023 16:06)      PHYSICAL EXAM:  GENERAL: NAD, well-groomed, well-developed  HEAD:  Atraumatic, Normocephalic  EYES: Conjunctiva and sclera clear, no jaundice   ENMT: No tonsillar erythema, exudates, or enlargement; Moist mucous membranes  NECK: Supple, non tender, No JVD   HEART: Regular rate and rhythm; No murmurs, rubs, or gallops. S1/S2 present  RESPIRATORY: CTA B/L, No W/R/R  ABDOMEN: Soft, Nontender, Nondistended; Bowel sounds present.   NEUROLOGY: A&Ox3, nonfocal, moving all extremities  EXTREMITIES:  2+ Peripheral Pulses, No clubbing, cyanosis, or edema  SKIN: warm, dry, normal color, no rash or abnormal lesions        LINES:     HOSPITAL MEDICATIONS:  Standing Meds:  dextrose 5% + sodium chloride 0.9% with potassium chloride 40 mEq/L 1000 milliLiter(s) IV Continuous <Continuous>  insulin lispro Injectable (ADMELOG) 5 Unit(s) SubCutaneous Once      PRN Meds:      LABS:                        12.5   5.69  )-----------( 365      ( 06 Dec 2023 12:34 )             36.9     Hgb Trend: 12.5<--      135  |  95<L>  |  11  ----------------------------<  235<H>  4.0   |  21<L>  |  0.53    Ca    9.4      06 Dec 2023 15:04  Phos  1.7       Mg     1.6         TPro  7.7  /  Alb  4.7  /  TBili  0.3  /  DBili  x   /  AST  14  /  ALT  17  /  AlkPhos  136<H>      Creatinine Trend: 0.53<--, 0.56<--    Urinalysis Basic - ( 06 Dec 2023 15:04 )    Color: x / Appearance: x / SG: x / pH: x  Gluc: 235 mg/dL / Ketone: x  / Bili: x / Urobili: x   Blood: x / Protein: x / Nitrite: x   Leuk Esterase: x / RBC: x / WBC x   Sq Epi: x / Non Sq Epi: x / Bacteria: x        Venous Blood Gas:   @ 15:52  7.32/49/25/25/20.5  VBG Lactate: 6.6  Venous Blood Gas:   @ 14:50  7.30/50/31/25/31.3  VBG Lactate: 5.5  Venous Blood Gas:   @ 12:30  7.33/49/37/26/48.4  VBG Lactate: 1.5      MICROBIOLOGY:       RADIOLOGY:  [ ] Reviewed and interpreted by me    EKG: CHIEF COMPLAINT: nausea    HPI:  24 yo F w/ PMHx of DM1 diagnosed in , at age 11, on insulin pump (Omnipod, self regulating insulin pump; Humalog) s/p D&C 23 presenting to ED for nausea which started over the past 2 weeks. Pt stated that today she vomited and has not eaten all day. She is able to keep water down. Denied diarrhea, abdominal pain or trying any new foods/restaurants recently. Pt also endorsed a cough which first started a month ago, faded away and then started again early this week. She stated that her son is sick w/ a cough at home. Pt denied running out of insulin or any issues with her insulin pump. Denied fever, chills, dysuria or urinary frequency.    In ED, pt received 5u admelog for fingersticks in 400s, with resolution of elevated fingersticks, as well as zofran and 2 L LR. Pt stated she feels much better, denied nausea, lightheadedness. HCG negative. Pt w/ elevated lactate on VBG.      PAST MEDICAL & SURGICAL HISTORY:  Type 1 diabetes      History of induced       NVD (normal vaginal delivery)          FAMILY HISTORY:  No pertinent family history in first degree relatives    Family history of hypothyroidism (Mother)        SOCIAL HISTORY:      Allergies    Cipro (Vomiting)    Intolerances      HOME MEDICATIONS:  Home Medications:  omnipod insulin pump: Max basal rate set to 1.5 units. (2023 07:42)      REVIEW OF SYSTEMS:  Constitutional: [ ] negative [ ] fevers [ ] chills [ ] weight loss [ ] weight gain  HEENT: [ ] negative [ ] dry eyes [ ] eye irritation [ ] postnasal drip [ ] nasal congestion  CV: [ ] negative  [ ] chest pain [ ] orthopnea [ ] palpitations [ ] murmur  Resp: [ ] negative [x ] cough [ ] shortness of breath [ ] dyspnea [ ] wheezing [ ] sputum [ ] hemoptysis  GI: [ ] negative [ ] nausea [ ] vomiting [ ] diarrhea [ ] constipation [ ] abd pain [ ] dysphagia   : [ ] negative [ ] dysuria [ ] nocturia [ ] hematuria [ ] increased urinary frequency  Musculoskeletal: [ ] negative [ ] back pain [ ] myalgias [ ] arthralgias [ ] fracture  Skin: [ ] negative [ ] rash [ ] itch  Neurological: [ ] negative [ ] headache [ ] dizziness [ ] syncope [ ] weakness [ ] numbness  Psychiatric: [ ] negative [ ] anxiety [ ] depression  Endocrine: [ ] negative [ ] diabetes [ ] thyroid problem  Hematologic/Lymphatic: [ ] negative [ ] anemia [ ] bleeding problem  Allergic/Immunologic: [ ] negative [ ] itchy eyes [ ] nasal discharge [ ] hives [ ] angioedema  [ ] All other systems negative    OBJECTIVE:  ICU Vital Signs Last 24 Hrs  T(C): 36.3 (06 Dec 2023 10:36), Max: 36.3 (06 Dec 2023 10:36)  T(F): 97.4 (06 Dec 2023 10:36), Max: 97.4 (06 Dec 2023 10:36)  HR: 92 (06 Dec 2023 10:36) (92 - 92)  BP: 120/83 (06 Dec 2023 10:36) (120/83 - 120/83)  BP(mean): --  ABP: --  ABP(mean): --  RR: 20 (06 Dec 2023 10:36) (20 - 20)  SpO2: 99% (06 Dec 2023 10:36) (99% - 99%)    O2 Parameters below as of 06 Dec 2023 10:36  Patient On (Oxygen Delivery Method): room air              CAPILLARY BLOOD GLUCOSE      POCT Blood Glucose.: 99 mg/dL (06 Dec 2023 16:06)      PHYSICAL EXAM:  GENERAL: NAD, well-groomed, well-developed  HEAD:  Atraumatic, Normocephalic  EYES: Conjunctiva and sclera clear, no jaundice   ENMT: No tonsillar erythema, exudates, or enlargement; Moist mucous membranes  NECK: Supple, non tender, No JVD   HEART: Regular rate and rhythm; No murmurs, rubs, or gallops. S1/S2 present  RESPIRATORY: CTA B/L, No W/R/R  ABDOMEN: Soft, Nontender, Nondistended; Bowel sounds present.   NEUROLOGY: A&Ox3, nonfocal, moving all extremities  EXTREMITIES:  2+ Peripheral Pulses, No clubbing, cyanosis, or edema  SKIN: warm, dry, normal color, no rash or abnormal lesions        LINES:     HOSPITAL MEDICATIONS:  Standing Meds:  dextrose 5% + sodium chloride 0.9% with potassium chloride 40 mEq/L 1000 milliLiter(s) IV Continuous <Continuous>  insulin lispro Injectable (ADMELOG) 5 Unit(s) SubCutaneous Once      PRN Meds:      LABS:                        12.5   5.69  )-----------( 365      ( 06 Dec 2023 12:34 )             36.9     Hgb Trend: 12.5<--      135  |  95<L>  |  11  ----------------------------<  235<H>  4.0   |  21<L>  |  0.53    Ca    9.4      06 Dec 2023 15:04  Phos  1.7       Mg     1.6         TPro  7.7  /  Alb  4.7  /  TBili  0.3  /  DBili  x   /  AST  14  /  ALT  17  /  AlkPhos  136<H>      Creatinine Trend: 0.53<--, 0.56<--    Urinalysis Basic - ( 06 Dec 2023 15:04 )    Color: x / Appearance: x / SG: x / pH: x  Gluc: 235 mg/dL / Ketone: x  / Bili: x / Urobili: x   Blood: x / Protein: x / Nitrite: x   Leuk Esterase: x / RBC: x / WBC x   Sq Epi: x / Non Sq Epi: x / Bacteria: x        Venous Blood Gas:   @ 15:52  7.32/49/25/25/20.5  VBG Lactate: 6.6  Venous Blood Gas:   @ 14:50  7.30/50/31/25/31.3  VBG Lactate: 5.5  Venous Blood Gas:   @ 12:30  7.33/49/37/26/48.4  VBG Lactate: 1.5      MICROBIOLOGY:       RADIOLOGY:  [ ] Reviewed and interpreted by me    EKG: CHIEF COMPLAINT: nausea    HPI:  22 yo F w/ PMHx of DM1 diagnosed in , at age 11, on insulin pump (Omnipod, self regulating insulin pump; Humalog) s/p D&C 23 presenting to ED for nausea which started over the past 2 weeks. Pt stated that today she vomited and has not eaten all day. She is able to keep water down. Denied diarrhea, abdominal pain or trying any new foods/restaurants recently. Pt also endorsed a cough which first started a month ago, faded away and then started again early this week. She stated that her son is sick w/ a cough at home. Pt denied running out of insulin or any issues with her insulin pump. Denied fever, chills, dysuria or urinary frequency.    In ED, pt received 5u admelog for fingersticks in 400s, with resolution of elevated fingersticks, as well as zofran and 2 L LR. Pt stated she feels much better, denied nausea, lightheadedness. HCG negative. Pt w/ elevated lactate on VBG.      PAST MEDICAL & SURGICAL HISTORY:  Type 1 diabetes      History of induced       NVD (normal vaginal delivery)          FAMILY HISTORY:  No pertinent family history in first degree relatives    Family history of hypothyroidism (Mother)        SOCIAL HISTORY:  Per spouse, pt engages in recreational smoking.      Allergies    Cipro (Vomiting)    Intolerances      HOME MEDICATIONS:  Home Medications:  omnipod insulin pump: Max basal rate set to 1.5 units. (2023 07:42)      REVIEW OF SYSTEMS:  Constitutional: [ ] negative [ ] fevers [ ] chills [ ] weight loss [ ] weight gain  HEENT: [ ] negative [ ] dry eyes [ ] eye irritation [ ] postnasal drip [ ] nasal congestion  CV: [ ] negative  [ ] chest pain [ ] orthopnea [ ] palpitations [ ] murmur  Resp: [ ] negative [x ] cough [ ] shortness of breath [ ] dyspnea [ ] wheezing [ ] sputum [ ] hemoptysis  GI: [ ] negative [ ] nausea [ ] vomiting [ ] diarrhea [ ] constipation [ ] abd pain [ ] dysphagia   : [ ] negative [ ] dysuria [ ] nocturia [ ] hematuria [ ] increased urinary frequency  Musculoskeletal: [ ] negative [ ] back pain [ ] myalgias [ ] arthralgias [ ] fracture  Skin: [ ] negative [ ] rash [ ] itch  Neurological: [ ] negative [ ] headache [ ] dizziness [ ] syncope [ ] weakness [ ] numbness  Psychiatric: [ ] negative [ ] anxiety [ ] depression  Endocrine: [ ] negative [ ] diabetes [ ] thyroid problem  Hematologic/Lymphatic: [ ] negative [ ] anemia [ ] bleeding problem  Allergic/Immunologic: [ ] negative [ ] itchy eyes [ ] nasal discharge [ ] hives [ ] angioedema  [ ] All other systems negative    OBJECTIVE:  ICU Vital Signs Last 24 Hrs  T(C): 36.3 (06 Dec 2023 10:36), Max: 36.3 (06 Dec 2023 10:36)  T(F): 97.4 (06 Dec 2023 10:36), Max: 97.4 (06 Dec 2023 10:36)  HR: 92 (06 Dec 2023 10:36) (92 - 92)  BP: 120/83 (06 Dec 2023 10:36) (120/83 - 120/83)  BP(mean): --  ABP: --  ABP(mean): --  RR: 20 (06 Dec 2023 10:36) (20 - 20)  SpO2: 99% (06 Dec 2023 10:36) (99% - 99%)    O2 Parameters below as of 06 Dec 2023 10:36  Patient On (Oxygen Delivery Method): room air              CAPILLARY BLOOD GLUCOSE      POCT Blood Glucose.: 99 mg/dL (06 Dec 2023 16:06)      PHYSICAL EXAM:  GENERAL: NAD, well-groomed, well-developed  HEAD:  Atraumatic, Normocephalic  EYES: Conjunctiva and sclera clear, no jaundice   ENMT: No tonsillar erythema, exudates, or enlargement; Moist mucous membranes  NECK: Supple, non tender, No JVD   HEART: Regular rate and rhythm; No murmurs, rubs, or gallops. S1/S2 present  RESPIRATORY: CTA B/L, No W/R/R  ABDOMEN: Soft, Nontender, Nondistended; Bowel sounds present.   NEUROLOGY: A&Ox3, nonfocal, moving all extremities  EXTREMITIES:  2+ Peripheral Pulses, No clubbing, cyanosis, or edema  SKIN: warm, dry, normal color, no rash or abnormal lesions        LINES:     HOSPITAL MEDICATIONS:  Standing Meds:  dextrose 5% + sodium chloride 0.9% with potassium chloride 40 mEq/L 1000 milliLiter(s) IV Continuous <Continuous>  insulin lispro Injectable (ADMELOG) 5 Unit(s) SubCutaneous Once      PRN Meds:      LABS:                        12.5   5.69  )-----------( 365      ( 06 Dec 2023 12:34 )             36.9     Hgb Trend: 12.5<--  12-    135  |  95<L>  |  11  ----------------------------<  235<H>  4.0   |  21<L>  |  0.53    Ca    9.4      06 Dec 2023 15:04  Phos  1.7       Mg     1.6         TPro  7.7  /  Alb  4.7  /  TBili  0.3  /  DBili  x   /  AST  14  /  ALT  17  /  AlkPhos  136<H>      Creatinine Trend: 0.53<--, 0.56<--    Urinalysis Basic - ( 06 Dec 2023 15:04 )    Color: x / Appearance: x / SG: x / pH: x  Gluc: 235 mg/dL / Ketone: x  / Bili: x / Urobili: x   Blood: x / Protein: x / Nitrite: x   Leuk Esterase: x / RBC: x / WBC x   Sq Epi: x / Non Sq Epi: x / Bacteria: x        Venous Blood Gas:   @ 15:52  7.32/49/25/25/20.5  VBG Lactate: 6.6  Venous Blood Gas:   @ 14:50  7.30/50/31/25/31.3  VBG Lactate: 5.5  Venous Blood Gas:   @ 12:30  7.33/49/37/26/48.4  VBG Lactate: 1.5      MICROBIOLOGY:       RADIOLOGY:  [ ] Reviewed and interpreted by me    EKG: CHIEF COMPLAINT: nausea    HPI:  22 yo F w/ PMHx of DM1 diagnosed in , at age 11, on insulin pump (Omnipod, self regulating insulin pump; Humalog) s/p D&C 23 presenting to ED for nausea which started over the past 2 weeks. Pt stated that today she vomited and has not eaten all day. She is able to keep water down. Denied diarrhea, abdominal pain or trying any new foods/restaurants recently. Pt also endorsed a cough which first started a month ago, faded away and then started again early this week. She stated that her son is sick w/ a cough at home. Pt denied running out of insulin or any issues with her insulin pump. Denied fever, chills, dysuria or urinary frequency.    In ED, pt received 5u admelog for fingersticks in 400s, with resolution of elevated fingersticks, as well as zofran and 2 L LR. Pt stated she feels much better, denied nausea, lightheadedness. HCG negative. Pt w/ elevated lactate on VBG.      PAST MEDICAL & SURGICAL HISTORY:  Type 1 diabetes      History of induced       NVD (normal vaginal delivery)          FAMILY HISTORY:  No pertinent family history in first degree relatives    Family history of hypothyroidism (Mother)        SOCIAL HISTORY:  Per spouse, pt engages in recreational smoking.      Allergies    Cipro (Vomiting)    Intolerances      HOME MEDICATIONS:  Home Medications:  omnipod insulin pump: Max basal rate set to 1.5 units. (2023 07:42)      REVIEW OF SYSTEMS:  Constitutional: [ ] negative [ ] fevers [ ] chills [ ] weight loss [ ] weight gain  HEENT: [ ] negative [ ] dry eyes [ ] eye irritation [ ] postnasal drip [ ] nasal congestion  CV: [ ] negative  [ ] chest pain [ ] orthopnea [ ] palpitations [ ] murmur  Resp: [ ] negative [x ] cough [ ] shortness of breath [ ] dyspnea [ ] wheezing [ ] sputum [ ] hemoptysis  GI: [ ] negative [ ] nausea [ ] vomiting [ ] diarrhea [ ] constipation [ ] abd pain [ ] dysphagia   : [ ] negative [ ] dysuria [ ] nocturia [ ] hematuria [ ] increased urinary frequency  Musculoskeletal: [ ] negative [ ] back pain [ ] myalgias [ ] arthralgias [ ] fracture  Skin: [ ] negative [ ] rash [ ] itch  Neurological: [ ] negative [ ] headache [ ] dizziness [ ] syncope [ ] weakness [ ] numbness  Psychiatric: [ ] negative [ ] anxiety [ ] depression  Endocrine: [ ] negative [ ] diabetes [ ] thyroid problem  Hematologic/Lymphatic: [ ] negative [ ] anemia [ ] bleeding problem  Allergic/Immunologic: [ ] negative [ ] itchy eyes [ ] nasal discharge [ ] hives [ ] angioedema  [x ] All other systems negative    OBJECTIVE:  ICU Vital Signs Last 24 Hrs  T(C): 36.3 (06 Dec 2023 10:36), Max: 36.3 (06 Dec 2023 10:36)  T(F): 97.4 (06 Dec 2023 10:36), Max: 97.4 (06 Dec 2023 10:36)  HR: 92 (06 Dec 2023 10:36) (92 - 92)  BP: 120/83 (06 Dec 2023 10:36) (120/83 - 120/83)  BP(mean): --  ABP: --  ABP(mean): --  RR: 20 (06 Dec 2023 10:36) (20 - 20)  SpO2: 99% (06 Dec 2023 10:36) (99% - 99%)    O2 Parameters below as of 06 Dec 2023 10:36  Patient On (Oxygen Delivery Method): room air      CAPILLARY BLOOD GLUCOSE  POCT Blood Glucose.: 99 mg/dL (06 Dec 2023 16:06)      PHYSICAL EXAM:  GENERAL: NAD, well-groomed, well-developed  HEAD:  Atraumatic, Normocephalic  EYES: Conjunctiva and sclera clear, no jaundice   ENMT: No tonsillar erythema, exudates, or enlargement; Moist mucous membranes  NECK: Supple, non tender, No JVD   HEART: Regular rate and rhythm; No murmurs, rubs, or gallops. S1/S2 present  RESPIRATORY: CTA B/L, No W/R/R  ABDOMEN: Soft, Nontender, Nondistended; Bowel sounds present.   NEUROLOGY: A&Ox3, nonfocal, moving all extremities  EXTREMITIES:  2+ Peripheral Pulses, No clubbing, cyanosis, or edema  SKIN: warm, dry, normal color, no rash or abnormal lesions        LINES:     HOSPITAL MEDICATIONS:  Standing Meds:  dextrose 5% + sodium chloride 0.9% with potassium chloride 40 mEq/L 1000 milliLiter(s) IV Continuous <Continuous>  insulin lispro Injectable (ADMELOG) 5 Unit(s) SubCutaneous Once      PRN Meds:      LABS:                        12.5   5.69  )-----------( 365      ( 06 Dec 2023 12:34 )             36.9     Hgb Trend: 12.5<--  12-    135  |  95<L>  |  11  ----------------------------<  235<H>  4.0   |  21<L>  |  0.53    Ca    9.4      06 Dec 2023 15:04  Phos  1.7       Mg     1.6         TPro  7.7  /  Alb  4.7  /  TBili  0.3  /  DBili  x   /  AST  14  /  ALT  17  /  AlkPhos  136<H>      Creatinine Trend: 0.53<--, 0.56<--    Urinalysis Basic - ( 06 Dec 2023 15:04 )    Color: x / Appearance: x / SG: x / pH: x  Gluc: 235 mg/dL / Ketone: x  / Bili: x / Urobili: x   Blood: x / Protein: x / Nitrite: x   Leuk Esterase: x / RBC: x / WBC x   Sq Epi: x / Non Sq Epi: x / Bacteria: x        Venous Blood Gas:   @ 15:52  7.32/49/25/25/20.5  VBG Lactate: 6.6  Venous Blood Gas:   @ 14:50  7.30/50/31/25/31.3  VBG Lactate: 5.5  Venous Blood Gas:   @ 12:30  7.33/49/37/26/48.4  VBG Lactate: 1.5      MICROBIOLOGY:   await [facility]    RADIOLOGY:  none performed.    EKG: tele wnl CHIEF COMPLAINT: nausea    HPI:  24 yo F w/ PMHx of DM1 diagnosed in , at age 11, on insulin pump (Omnipod, self regulating insulin pump; Humalog) s/p D&C 23 presenting to ED for nausea which started over the past 2 weeks. Pt stated that today she vomited and has not eaten all day. She is able to keep water down. Denied diarrhea, abdominal pain or trying any new foods/restaurants recently. Pt also endorsed a cough which first started a month ago, faded away and then started again early this week. She stated that her son is sick w/ a cough at home. Pt denied running out of insulin or any issues with her insulin pump. Denied fever, chills, dysuria or urinary frequency.    In ED, pt received 5u admelog for fingersticks in 400s, with resolution of elevated fingersticks, as well as zofran and 2 L LR. Pt stated she feels much better, denied nausea, lightheadedness. HCG negative. Pt w/ elevated lactate on VBG.      PAST MEDICAL & SURGICAL HISTORY:  Type 1 diabetes      History of induced       NVD (normal vaginal delivery)          FAMILY HISTORY:  No pertinent family history in first degree relatives    Family history of hypothyroidism (Mother)        SOCIAL HISTORY:  Per spouse, pt engages in recreational smoking.      Allergies    Cipro (Vomiting)    Intolerances      HOME MEDICATIONS:  Home Medications:  omnipod insulin pump: Max basal rate set to 1.5 units. (2023 07:42)      REVIEW OF SYSTEMS:  Constitutional: [ ] negative [ ] fevers [ ] chills [ ] weight loss [ ] weight gain  HEENT: [ ] negative [ ] dry eyes [ ] eye irritation [ ] postnasal drip [ ] nasal congestion  CV: [ ] negative  [ ] chest pain [ ] orthopnea [ ] palpitations [ ] murmur  Resp: [ ] negative [x ] cough [ ] shortness of breath [ ] dyspnea [ ] wheezing [ ] sputum [ ] hemoptysis  GI: [ ] negative [ ] nausea [ ] vomiting [ ] diarrhea [ ] constipation [ ] abd pain [ ] dysphagia   : [ ] negative [ ] dysuria [ ] nocturia [ ] hematuria [ ] increased urinary frequency  Musculoskeletal: [ ] negative [ ] back pain [ ] myalgias [ ] arthralgias [ ] fracture  Skin: [ ] negative [ ] rash [ ] itch  Neurological: [ ] negative [ ] headache [ ] dizziness [ ] syncope [ ] weakness [ ] numbness  Psychiatric: [ ] negative [ ] anxiety [ ] depression  Endocrine: [ ] negative [ ] diabetes [ ] thyroid problem  Hematologic/Lymphatic: [ ] negative [ ] anemia [ ] bleeding problem  Allergic/Immunologic: [ ] negative [ ] itchy eyes [ ] nasal discharge [ ] hives [ ] angioedema  [x ] All other systems negative    OBJECTIVE:  ICU Vital Signs Last 24 Hrs  T(C): 36.3 (06 Dec 2023 10:36), Max: 36.3 (06 Dec 2023 10:36)  T(F): 97.4 (06 Dec 2023 10:36), Max: 97.4 (06 Dec 2023 10:36)  HR: 92 (06 Dec 2023 10:36) (92 - 92)  BP: 120/83 (06 Dec 2023 10:36) (120/83 - 120/83)  BP(mean): --  ABP: --  ABP(mean): --  RR: 20 (06 Dec 2023 10:36) (20 - 20)  SpO2: 99% (06 Dec 2023 10:36) (99% - 99%)    O2 Parameters below as of 06 Dec 2023 10:36  Patient On (Oxygen Delivery Method): room air      CAPILLARY BLOOD GLUCOSE  POCT Blood Glucose.: 99 mg/dL (06 Dec 2023 16:06)      PHYSICAL EXAM:  GENERAL: NAD, well-groomed, well-developed  HEAD:  Atraumatic, Normocephalic  EYES: Conjunctiva and sclera clear, no jaundice   ENMT: No tonsillar erythema, exudates, or enlargement; Moist mucous membranes  NECK: Supple, non tender, No JVD   HEART: Regular rate and rhythm; No murmurs, rubs, or gallops. S1/S2 present  RESPIRATORY: CTA B/L, No W/R/R  ABDOMEN: Soft, Nontender, Nondistended; Bowel sounds present.   NEUROLOGY: A&Ox3, nonfocal, moving all extremities  EXTREMITIES:  2+ Peripheral Pulses, No clubbing, cyanosis, or edema  SKIN: warm, dry, normal color, no rash or abnormal lesions        LINES:     HOSPITAL MEDICATIONS:  Standing Meds:  dextrose 5% + sodium chloride 0.9% with potassium chloride 40 mEq/L 1000 milliLiter(s) IV Continuous <Continuous>  insulin lispro Injectable (ADMELOG) 5 Unit(s) SubCutaneous Once      PRN Meds:      LABS:                        12.5   5.69  )-----------( 365      ( 06 Dec 2023 12:34 )             36.9     Hgb Trend: 12.5<--  12-    135  |  95<L>  |  11  ----------------------------<  235<H>  4.0   |  21<L>  |  0.53    Ca    9.4      06 Dec 2023 15:04  Phos  1.7       Mg     1.6         TPro  7.7  /  Alb  4.7  /  TBili  0.3  /  DBili  x   /  AST  14  /  ALT  17  /  AlkPhos  136<H>      Creatinine Trend: 0.53<--, 0.56<--    Urinalysis Basic - ( 06 Dec 2023 15:04 )    Color: x / Appearance: x / SG: x / pH: x  Gluc: 235 mg/dL / Ketone: x  / Bili: x / Urobili: x   Blood: x / Protein: x / Nitrite: x   Leuk Esterase: x / RBC: x / WBC x   Sq Epi: x / Non Sq Epi: x / Bacteria: x        Venous Blood Gas:   @ 15:52  7.32/49/25/25/20.5  VBG Lactate: 6.6  Venous Blood Gas:   @ 14:50  7.30/50/31/25/31.3  VBG Lactate: 5.5  Venous Blood Gas:   @ 12:30  7.33/49/37/26/48.4  VBG Lactate: 1.5      MICROBIOLOGY:   await [facility]    RADIOLOGY:  none performed.    EKG: tele wnl

## 2023-12-31 PROBLEM — Z20.2 POSSIBLE EXPOSURE TO STD: Status: RESOLVED | Noted: 2019-08-30 | Resolved: 2021-02-23

## 2024-01-02 ENCOUNTER — APPOINTMENT (OUTPATIENT)
Dept: OBGYN | Facility: CLINIC | Age: 24
End: 2024-01-02
Payer: COMMERCIAL

## 2024-01-02 VITALS
DIASTOLIC BLOOD PRESSURE: 83 MMHG | HEIGHT: 61 IN | SYSTOLIC BLOOD PRESSURE: 120 MMHG | BODY MASS INDEX: 20.98 KG/M2 | WEIGHT: 111.13 LBS

## 2024-01-02 DIAGNOSIS — Z30.09 ENCOUNTER FOR OTHER GENERAL COUNSELING AND ADVICE ON CONTRACEPTION: ICD-10-CM

## 2024-01-02 DIAGNOSIS — N64.3 GALACTORRHEA NOT ASSOCIATED WITH CHILDBIRTH: ICD-10-CM

## 2024-01-02 PROCEDURE — 99214 OFFICE O/P EST MOD 30 MIN: CPT

## 2024-01-02 NOTE — PLAN
[FreeTextEntry1] : Discussed obtaining PRL and TSH and breast sono for galactorrhea. Also discussed BCM options given uncontrolled DM1, discussed LARCs but pt declines at this time. Disucssed strict condom use. RTO for pap in 6/2024.

## 2024-01-02 NOTE — HISTORY OF PRESENT ILLNESS
[FreeTextEntry1] : 22yo P1 here for galactorrhea. Hx of T1DM and was hospitalized last month for "near DKA" and noted b/l spontaneous milky discharge from both breasts. Also noted irregular menses in Oct where two cycles were only 2-3 weeks apart. Using condoms for birth control. Not considering pregnancy anytime in next 2-3 years.   HCM - pap 2023 [facility]  POB: 10/2021  c/b shoulder dystocia "[place]" 3908g, PEC w SF

## 2024-01-03 LAB
PROLACTIN SERPL-MCNC: 10.6 NG/ML
TSH SERPL-ACNC: 1.02 UIU/ML

## 2024-04-11 ENCOUNTER — LABORATORY RESULT (OUTPATIENT)
Age: 24
End: 2024-04-11

## 2024-04-12 LAB
HIV1+2 AB SPEC QL IA.RAPID: NONREACTIVE
T PALLIDUM AB SER QL IA: NEGATIVE

## 2024-04-13 LAB
HBV SURFACE AB SER QL: NONREACTIVE
HCV AB SER QL: REACTIVE
HCV S/CO RATIO: 1 S/CO

## 2024-04-15 ENCOUNTER — TRANSCRIPTION ENCOUNTER (OUTPATIENT)
Age: 24
End: 2024-04-15

## 2024-04-16 LAB
C TRACH RRNA SPEC QL NAA+PROBE: NOT DETECTED
N GONORRHOEA RRNA SPEC QL NAA+PROBE: NOT DETECTED
SOURCE AMPLIFICATION: NORMAL
SOURCE AMPLIFICATION: NORMAL
T VAGINALIS RRNA SPEC QL NAA+PROBE: NOT DETECTED

## 2024-05-14 RX ORDER — TERCONAZOLE 4 MG/G
0.4 CREAM VAGINAL DAILY
Qty: 1 | Refills: 0 | Status: ACTIVE | COMMUNITY
Start: 2024-05-14 | End: 1900-01-01

## 2024-05-28 ENCOUNTER — APPOINTMENT (OUTPATIENT)
Dept: OBGYN | Facility: CLINIC | Age: 24
End: 2024-05-28

## 2024-05-30 RX ORDER — INSULIN PMP CART,AUT,G6/7,CNTR
EACH SUBCUTANEOUS
Qty: 9 | Refills: 3 | Status: ACTIVE | COMMUNITY
Start: 2022-07-15 | End: 1900-01-01

## 2024-05-30 RX ORDER — INSULIN PUMP CART,CONT INF,RF
CARTRIDGE (EA) SUBCUTANEOUS
Qty: 18 | Refills: 3 | Status: DISCONTINUED | COMMUNITY
Start: 2018-08-08 | End: 2024-05-30

## 2024-05-31 DIAGNOSIS — Z34.92 ENCOUNTER FOR SUPERVISION OF NORMAL PREGNANCY, UNSPECIFIED, SECOND TRIMESTER: ICD-10-CM

## 2024-05-31 DIAGNOSIS — Z34.93 ENCOUNTER FOR SUPERVISION OF NORMAL PREGNANCY, UNSPECIFIED, THIRD TRIMESTER: ICD-10-CM

## 2024-05-31 DIAGNOSIS — E10.9 TYPE 1 DIABETES MELLITUS W/OUT COMPLICATIONS: ICD-10-CM

## 2024-05-31 DIAGNOSIS — Z34.91 ENCOUNTER FOR SUPERVISION OF NORMAL PREGNANCY, UNSPECIFIED, FIRST TRIMESTER: ICD-10-CM

## 2024-05-31 RX ORDER — INSULIN LISPRO 100 [IU]/ML
100 INJECTION, SOLUTION INTRAVENOUS; SUBCUTANEOUS
Qty: 10 | Refills: 0 | Status: ACTIVE | COMMUNITY
Start: 2022-07-06 | End: 1900-01-01

## 2024-06-08 DIAGNOSIS — R30.0 DYSURIA: ICD-10-CM

## 2024-06-08 RX ORDER — NITROFURANTOIN (MONOHYDRATE/MACROCRYSTALS) 25; 75 MG/1; MG/1
100 CAPSULE ORAL TWICE DAILY
Qty: 10 | Refills: 0 | Status: ACTIVE | COMMUNITY
Start: 2024-06-08 | End: 1900-01-01

## 2024-07-09 ENCOUNTER — APPOINTMENT (OUTPATIENT)
Dept: ENDOCRINOLOGY | Facility: CLINIC | Age: 24
End: 2024-07-09
Payer: COMMERCIAL

## 2024-07-09 VITALS
WEIGHT: 115 LBS | HEIGHT: 61 IN | DIASTOLIC BLOOD PRESSURE: 60 MMHG | BODY MASS INDEX: 21.71 KG/M2 | SYSTOLIC BLOOD PRESSURE: 100 MMHG | OXYGEN SATURATION: 99 % | HEART RATE: 90 BPM

## 2024-07-09 DIAGNOSIS — Z34.90 ENCOUNTER FOR SUPERVISION OF NORMAL PREGNANCY, UNSPECIFIED, UNSPECIFIED TRIMESTER: ICD-10-CM

## 2024-07-09 DIAGNOSIS — N89.8 OTHER SPECIFIED NONINFLAMMATORY DISORDERS OF VAGINA: ICD-10-CM

## 2024-07-09 DIAGNOSIS — O24.013 PRE-EXISTING TYPE 1 DIABETES MELLITUS, IN PREGNANCY, THIRD TRIMESTER: ICD-10-CM

## 2024-07-09 DIAGNOSIS — Z86.19 PERSONAL HISTORY OF OTHER INFECTIOUS AND PARASITIC DISEASES: ICD-10-CM

## 2024-07-09 DIAGNOSIS — B37.9 CANDIDIASIS, UNSPECIFIED: ICD-10-CM

## 2024-07-09 DIAGNOSIS — E10.9 TYPE 1 DIABETES MELLITUS W/OUT COMPLICATIONS: ICD-10-CM

## 2024-07-09 PROCEDURE — 99214 OFFICE O/P EST MOD 30 MIN: CPT

## 2024-07-09 RX ORDER — INSULIN ASPART 100 [IU]/ML
100 INJECTION, SOLUTION INTRAVENOUS; SUBCUTANEOUS
Qty: 3 | Refills: 0 | Status: ACTIVE | COMMUNITY
Start: 2024-07-09 | End: 1900-01-01

## 2024-07-09 RX ORDER — INSULIN ASPART 100 [IU]/ML
100 INJECTION, SOLUTION INTRAVENOUS; SUBCUTANEOUS
Qty: 1 | Refills: 5 | Status: ACTIVE | COMMUNITY
Start: 2024-07-09 | End: 1900-01-01

## 2024-07-09 RX ORDER — INSULIN GLARGINE 100 [IU]/ML
100 INJECTION, SOLUTION SUBCUTANEOUS
Qty: 5 | Refills: 4 | Status: ACTIVE | COMMUNITY
Start: 2024-07-09 | End: 1900-01-01

## 2024-07-10 PROBLEM — Z34.90 UNPLANNED PREGNANCY: Status: RESOLVED | Noted: 2020-12-11 | Resolved: 2024-07-10

## 2024-07-10 PROBLEM — Z86.19 HISTORY OF TRICHOMONIASIS: Status: RESOLVED | Noted: 2023-02-07 | Resolved: 2024-07-10

## 2024-07-10 PROBLEM — B37.9 YEAST INFECTION: Status: RESOLVED | Noted: 2019-12-12 | Resolved: 2024-07-10

## 2024-07-10 PROBLEM — O24.013 PRE-EXISTING TYPE 1 DIABETES MELLITUS DURING PREGNANCY IN THIRD TRIMESTER: Status: RESOLVED | Noted: 2021-10-18 | Resolved: 2024-07-10

## 2024-07-10 PROBLEM — N89.8 VAGINAL IRRITATION: Status: RESOLVED | Noted: 2022-09-23 | Resolved: 2024-07-10

## 2024-10-14 ENCOUNTER — APPOINTMENT (OUTPATIENT)
Dept: OBGYN | Facility: CLINIC | Age: 24
End: 2024-10-14

## 2024-11-15 NOTE — H&P PST ADULT - HEIGHT IN FEET
5 [Has anyone in your immediate family (parents, grandparents, siblings) or other more distant relatives (aunts, uncles, cousins)  of heart] : Has anyone in your immediate family (parents, grandparents, siblings) or other more distant relatives (aunts, uncles, cousins)  of heart problems or had an unexpected sudden death before age 50 (This would include unexpected drownings, unexplained car accidents in which the relative was driving or sudden infant death syndrome.)? No [Are you related to anyone with hypertrophic cardiomyopathy or hypertrophic obstructive cardiomyopathy, Marfan syndrome, arrhythmogenic] : Are you related to anyone with hypertrophic cardiomyopathy or hypertrophic obstructive cardiomyopathy, Marfan syndrome, arrhythmogenic right ventricular cardiomyopathy, long QT syndrome, short QT syndrome, Brugada syndrome or catecholaminergic polymorphic ventricular tachycardia, or anyone younger than 50 years with a pacemaker or implantable defibrillator? No [No family history of SCA predisposing conditions] : No family history of SCA predisposing conditions

## 2024-12-03 ENCOUNTER — APPOINTMENT (OUTPATIENT)
Dept: OBGYN | Facility: CLINIC | Age: 24
End: 2024-12-03
Payer: COMMERCIAL

## 2024-12-03 VITALS — RESPIRATION RATE: 18 BRPM | SYSTOLIC BLOOD PRESSURE: 113 MMHG | HEART RATE: 99 BPM | DIASTOLIC BLOOD PRESSURE: 76 MMHG

## 2024-12-03 DIAGNOSIS — Z11.3 ENCOUNTER FOR SCREENING FOR INFECTIONS WITH A PREDOMINANTLY SEXUAL MODE OF TRANSMISSION: ICD-10-CM

## 2024-12-03 DIAGNOSIS — N89.8 OTHER SPECIFIED NONINFLAMMATORY DISORDERS OF VAGINA: ICD-10-CM

## 2024-12-03 DIAGNOSIS — N92.6 IRREGULAR MENSTRUATION, UNSPECIFIED: ICD-10-CM

## 2024-12-03 PROCEDURE — 99214 OFFICE O/P EST MOD 30 MIN: CPT

## 2024-12-03 RX ORDER — FLUCONAZOLE 150 MG/1
150 TABLET ORAL
Qty: 2 | Refills: 0 | Status: ACTIVE | COMMUNITY
Start: 2024-12-03 | End: 1900-01-01

## 2024-12-03 RX ORDER — TRIAMCINOLONE ACETONIDE 5 MG/G
0.5 OINTMENT TOPICAL TWICE DAILY
Qty: 1 | Refills: 0 | Status: ACTIVE | COMMUNITY
Start: 2024-12-03 | End: 1900-01-01

## 2024-12-04 LAB
APPEARANCE: CLEAR
BILIRUBIN URINE: NEGATIVE
BLOOD URINE: NEGATIVE
COLOR: YELLOW
GLUCOSE QUALITATIVE U: >=1000 MG/DL
HIV1+2 AB SPEC QL IA.RAPID: NONREACTIVE
KETONES URINE: NEGATIVE MG/DL
LEUKOCYTE ESTERASE URINE: NEGATIVE
NITRITE URINE: NEGATIVE
PH URINE: 6
PROTEIN URINE: NEGATIVE MG/DL
SPECIFIC GRAVITY URINE: >1.03
T PALLIDUM AB SER QL IA: NEGATIVE
UROBILINOGEN URINE: 0.2 MG/DL

## 2024-12-06 LAB
BACTERIA UR CULT: ABNORMAL
BV BACTERIA RRNA VAG QL NAA+PROBE: DETECTED
C GLABRATA RNA VAG QL NAA+PROBE: NOT DETECTED
C TRACH RRNA SPEC QL NAA+PROBE: NOT DETECTED
CANDIDA RRNA VAG QL PROBE: DETECTED
N GONORRHOEA RRNA SPEC QL NAA+PROBE: NOT DETECTED
T VAGINALIS RRNA SPEC QL NAA+PROBE: NOT DETECTED

## 2024-12-09 ENCOUNTER — APPOINTMENT (OUTPATIENT)
Dept: ENDOCRINOLOGY | Facility: CLINIC | Age: 24
End: 2024-12-09

## 2024-12-10 ENCOUNTER — EMERGENCY (EMERGENCY)
Facility: HOSPITAL | Age: 24
LOS: 1 days | Discharge: HOME | End: 2024-12-10
Attending: EMERGENCY MEDICINE
Payer: COMMERCIAL

## 2024-12-10 VITALS
OXYGEN SATURATION: 97 % | HEIGHT: 61 IN | HEART RATE: 106 BPM | TEMPERATURE: 98 F | SYSTOLIC BLOOD PRESSURE: 106 MMHG | RESPIRATION RATE: 20 BRPM | DIASTOLIC BLOOD PRESSURE: 67 MMHG | WEIGHT: 115.08 LBS

## 2024-12-10 VITALS
RESPIRATION RATE: 20 BRPM | TEMPERATURE: 98 F | HEART RATE: 98 BPM | OXYGEN SATURATION: 98 % | SYSTOLIC BLOOD PRESSURE: 112 MMHG | DIASTOLIC BLOOD PRESSURE: 80 MMHG

## 2024-12-10 DIAGNOSIS — Z98.890 OTHER SPECIFIED POSTPROCEDURAL STATES: Chronic | ICD-10-CM

## 2024-12-10 LAB
ALBUMIN SERPL ELPH-MCNC: 4.5 G/DL — SIGNIFICANT CHANGE UP (ref 3.3–5)
ALP SERPL-CCNC: 75 U/L — SIGNIFICANT CHANGE UP (ref 40–120)
ALT FLD-CCNC: 13 U/L — SIGNIFICANT CHANGE UP (ref 10–45)
ANION GAP SERPL CALC-SCNC: 15 MMOL/L — SIGNIFICANT CHANGE UP (ref 5–17)
APPEARANCE UR: CLEAR — SIGNIFICANT CHANGE UP
AST SERPL-CCNC: 17 U/L — SIGNIFICANT CHANGE UP (ref 10–40)
B-OH-BUTYR SERPL-SCNC: 0.3 MMOL/L — SIGNIFICANT CHANGE UP
BACTERIA # UR AUTO: NEGATIVE /HPF — SIGNIFICANT CHANGE UP
BASOPHILS # BLD AUTO: 0.01 K/UL — SIGNIFICANT CHANGE UP (ref 0–0.2)
BASOPHILS NFR BLD AUTO: 0.2 % — SIGNIFICANT CHANGE UP (ref 0–2)
BILIRUB SERPL-MCNC: 0.6 MG/DL — SIGNIFICANT CHANGE UP (ref 0.2–1.2)
BILIRUB UR-MCNC: NEGATIVE — SIGNIFICANT CHANGE UP
BUN SERPL-MCNC: 14 MG/DL — SIGNIFICANT CHANGE UP (ref 7–23)
CALCIUM SERPL-MCNC: 9.4 MG/DL — SIGNIFICANT CHANGE UP (ref 8.4–10.5)
CAST: 1 /LPF — SIGNIFICANT CHANGE UP (ref 0–4)
CHLORIDE SERPL-SCNC: 101 MMOL/L — SIGNIFICANT CHANGE UP (ref 96–108)
CO2 SERPL-SCNC: 23 MMOL/L — SIGNIFICANT CHANGE UP (ref 22–31)
COLOR SPEC: YELLOW — SIGNIFICANT CHANGE UP
CREAT SERPL-MCNC: 0.57 MG/DL — SIGNIFICANT CHANGE UP (ref 0.5–1.3)
DIFF PNL FLD: NEGATIVE — SIGNIFICANT CHANGE UP
EGFR: 131 ML/MIN/1.73M2 — SIGNIFICANT CHANGE UP
EOSINOPHIL # BLD AUTO: 0.01 K/UL — SIGNIFICANT CHANGE UP (ref 0–0.5)
EOSINOPHIL NFR BLD AUTO: 0.2 % — SIGNIFICANT CHANGE UP (ref 0–6)
FLUAV AG NPH QL: SIGNIFICANT CHANGE UP
FLUBV AG NPH QL: SIGNIFICANT CHANGE UP
GAS PNL BLDV: SIGNIFICANT CHANGE UP
GLUCOSE SERPL-MCNC: 137 MG/DL — HIGH (ref 70–99)
GLUCOSE UR QL: NEGATIVE MG/DL — SIGNIFICANT CHANGE UP
HCG SERPL-ACNC: <2 MIU/ML — SIGNIFICANT CHANGE UP
HCT VFR BLD CALC: 41.1 % — SIGNIFICANT CHANGE UP (ref 34.5–45)
HGB BLD-MCNC: 14 G/DL — SIGNIFICANT CHANGE UP (ref 11.5–15.5)
IMM GRANULOCYTES NFR BLD AUTO: 0.2 % — SIGNIFICANT CHANGE UP (ref 0–0.9)
KETONES UR-MCNC: 15 MG/DL
LEUKOCYTE ESTERASE UR-ACNC: ABNORMAL
LIDOCAIN IGE QN: 15 U/L — SIGNIFICANT CHANGE UP (ref 7–60)
LYMPHOCYTES # BLD AUTO: 0.61 K/UL — LOW (ref 1–3.3)
LYMPHOCYTES # BLD AUTO: 9.5 % — LOW (ref 13–44)
MAGNESIUM SERPL-MCNC: 1.7 MG/DL — SIGNIFICANT CHANGE UP (ref 1.6–2.6)
MCHC RBC-ENTMCNC: 31.5 PG — SIGNIFICANT CHANGE UP (ref 27–34)
MCHC RBC-ENTMCNC: 34.1 G/DL — SIGNIFICANT CHANGE UP (ref 32–36)
MCV RBC AUTO: 92.4 FL — SIGNIFICANT CHANGE UP (ref 80–100)
MONOCYTES # BLD AUTO: 0.15 K/UL — SIGNIFICANT CHANGE UP (ref 0–0.9)
MONOCYTES NFR BLD AUTO: 2.3 % — SIGNIFICANT CHANGE UP (ref 2–14)
MUCOUS THREADS # UR AUTO: PRESENT
NEUTROPHILS # BLD AUTO: 5.66 K/UL — SIGNIFICANT CHANGE UP (ref 1.8–7.4)
NEUTROPHILS NFR BLD AUTO: 87.6 % — HIGH (ref 43–77)
NITRITE UR-MCNC: NEGATIVE — SIGNIFICANT CHANGE UP
NRBC # BLD: 0 /100 WBCS — SIGNIFICANT CHANGE UP (ref 0–0)
PH UR: 5.5 — SIGNIFICANT CHANGE UP (ref 5–8)
PLATELET # BLD AUTO: 345 K/UL — SIGNIFICANT CHANGE UP (ref 150–400)
POTASSIUM SERPL-MCNC: 3.5 MMOL/L — SIGNIFICANT CHANGE UP (ref 3.5–5.3)
POTASSIUM SERPL-SCNC: 3.5 MMOL/L — SIGNIFICANT CHANGE UP (ref 3.5–5.3)
PROT SERPL-MCNC: 7.8 G/DL — SIGNIFICANT CHANGE UP (ref 6–8.3)
PROT UR-MCNC: SIGNIFICANT CHANGE UP MG/DL
RBC # BLD: 4.45 M/UL — SIGNIFICANT CHANGE UP (ref 3.8–5.2)
RBC # FLD: 11.7 % — SIGNIFICANT CHANGE UP (ref 10.3–14.5)
RBC CASTS # UR COMP ASSIST: 0 /HPF — SIGNIFICANT CHANGE UP (ref 0–4)
REVIEW: SIGNIFICANT CHANGE UP
RSV RNA NPH QL NAA+NON-PROBE: SIGNIFICANT CHANGE UP
SARS-COV-2 RNA SPEC QL NAA+PROBE: SIGNIFICANT CHANGE UP
SODIUM SERPL-SCNC: 139 MMOL/L — SIGNIFICANT CHANGE UP (ref 135–145)
SP GR SPEC: 1.02 — SIGNIFICANT CHANGE UP (ref 1–1.03)
SQUAMOUS # UR AUTO: 8 /HPF — HIGH (ref 0–5)
UROBILINOGEN FLD QL: 0.2 MG/DL — SIGNIFICANT CHANGE UP (ref 0.2–1)
WBC # BLD: 6.45 K/UL — SIGNIFICANT CHANGE UP (ref 3.8–10.5)
WBC # FLD AUTO: 6.45 K/UL — SIGNIFICANT CHANGE UP (ref 3.8–10.5)
WBC UR QL: 5 /HPF — SIGNIFICANT CHANGE UP (ref 0–5)

## 2024-12-10 PROCEDURE — 99284 EMERGENCY DEPT VISIT MOD MDM: CPT | Mod: 25

## 2024-12-10 PROCEDURE — 96374 THER/PROPH/DIAG INJ IV PUSH: CPT

## 2024-12-10 PROCEDURE — 85018 HEMOGLOBIN: CPT

## 2024-12-10 PROCEDURE — 36415 COLL VENOUS BLD VENIPUNCTURE: CPT

## 2024-12-10 PROCEDURE — 80053 COMPREHEN METABOLIC PANEL: CPT

## 2024-12-10 PROCEDURE — 84702 CHORIONIC GONADOTROPIN TEST: CPT

## 2024-12-10 PROCEDURE — 83605 ASSAY OF LACTIC ACID: CPT

## 2024-12-10 PROCEDURE — 81001 URINALYSIS AUTO W/SCOPE: CPT

## 2024-12-10 PROCEDURE — 87637 SARSCOV2&INF A&B&RSV AMP PRB: CPT

## 2024-12-10 PROCEDURE — 83735 ASSAY OF MAGNESIUM: CPT

## 2024-12-10 PROCEDURE — 84132 ASSAY OF SERUM POTASSIUM: CPT

## 2024-12-10 PROCEDURE — 85014 HEMATOCRIT: CPT

## 2024-12-10 PROCEDURE — 83690 ASSAY OF LIPASE: CPT

## 2024-12-10 PROCEDURE — 82803 BLOOD GASES ANY COMBINATION: CPT

## 2024-12-10 PROCEDURE — 82010 KETONE BODYS QUAN: CPT

## 2024-12-10 PROCEDURE — 82947 ASSAY GLUCOSE BLOOD QUANT: CPT

## 2024-12-10 PROCEDURE — 84295 ASSAY OF SERUM SODIUM: CPT

## 2024-12-10 PROCEDURE — 99284 EMERGENCY DEPT VISIT MOD MDM: CPT

## 2024-12-10 PROCEDURE — 85025 COMPLETE CBC W/AUTO DIFF WBC: CPT

## 2024-12-10 PROCEDURE — 87086 URINE CULTURE/COLONY COUNT: CPT

## 2024-12-10 PROCEDURE — 82330 ASSAY OF CALCIUM: CPT

## 2024-12-10 PROCEDURE — 82435 ASSAY OF BLOOD CHLORIDE: CPT

## 2024-12-10 PROCEDURE — 82962 GLUCOSE BLOOD TEST: CPT

## 2024-12-10 RX ORDER — SODIUM CHLORIDE 9 MG/ML
1000 INJECTION, SOLUTION INTRAMUSCULAR; INTRAVENOUS; SUBCUTANEOUS ONCE
Refills: 0 | Status: COMPLETED | OUTPATIENT
Start: 2024-12-10 | End: 2024-12-10

## 2024-12-10 RX ORDER — ONDANSETRON HYDROCHLORIDE 4 MG/1
1 TABLET, FILM COATED ORAL
Qty: 9 | Refills: 0
Start: 2024-12-10 | End: 2024-12-12

## 2024-12-10 RX ORDER — ONDANSETRON HYDROCHLORIDE 4 MG/1
4 TABLET, FILM COATED ORAL ONCE
Refills: 0 | Status: COMPLETED | OUTPATIENT
Start: 2024-12-10 | End: 2024-12-10

## 2024-12-10 RX ADMIN — SODIUM CHLORIDE 1000 MILLILITER(S): 9 INJECTION, SOLUTION INTRAMUSCULAR; INTRAVENOUS; SUBCUTANEOUS at 21:30

## 2024-12-10 RX ADMIN — ONDANSETRON HYDROCHLORIDE 4 MILLIGRAM(S): 4 TABLET, FILM COATED ORAL at 21:30

## 2024-12-10 NOTE — ED PROVIDER NOTE - NSFOLLOWUPINSTRUCTIONS_ED_ALL_ED_FT
1. Please follow up with your Primary Care Doctor after discharge, bring a copy of your results to follow up appointment for review     2. Please rest, stay hydrated and continue all at home medications as previously prescribed    3. For nausea take Zofran 4mg every 8 hours as needed     4. Return to ED for any new or worsened symptoms of concern

## 2024-12-10 NOTE — ED PROVIDER NOTE - OBJECTIVE STATEMENT
24 year old female with of type 1 diabetes with insulin pump presents the emergency department complaining of nausea and vomiting which started acutely around 7 AM this morning.  Patient reports that she had approximately 6 episodes of nonbloody, nonbilious bilious vomiting associated with epigastric pain throughout the day.  She has had limited p.o. intake as well.  She denies fevers, chills or chest pain, shortness of breath, diarrhea, sick contacts, recent antibiotic use or recent travel.

## 2024-12-10 NOTE — ED ADULT NURSE NOTE - OBJECTIVE STATEMENT
24 y.o F A&OX4 w. no significant PMH presents to ED complaining of nausea. Pt states that she started feeling nauseous and vomiting since 7 am this morning. Pt reports multiple episodes of NBNB vomit. Pt states that she also has diffuse abdominal pain as well. Pt denies any diarrhea at this time. Pt denies fever, chills, urinary symptoms, chest pain, SOB.

## 2024-12-10 NOTE — ED PROVIDER NOTE - PROGRESS NOTE DETAILS
Patient labs reviewed without acute concerning findings.  No concern for DKA.  Patient feeling improved, no further vomiting in the ED will discharge home with Rx for Zofran.  Cindy Nichols PA-C

## 2024-12-10 NOTE — ED PROVIDER NOTE - PHYSICAL EXAMINATION
CONSTITUTIONAL: Patient is awake, alert and oriented x 3. Patient is well appearing and in no acute distress  HEAD: NCAT  NECK: supple, FROM  LUNGS: CTA b/l, no wheezing or rales   HEART: RRR.+S1S2 no murmurs  ABDOMEN: Soft, non-distended, mild epigastric ttp otherwise nttp,  no rebound or guarding  EXTREMITY: No edema or calf tenderness b/l, FROM upper and lower ext b/l  SKIN: No rash or lesions  NEURO: No focal deficits

## 2024-12-10 NOTE — ED PROVIDER NOTE - PATIENT PORTAL LINK FT
You can access the FollowMyHealth Patient Portal offered by [facility] by registering at the following website: [email or web address]. By joining [employer]’s FollowMyHealth portal, you will also be able to view your health information using other applications (apps) compatible with our system.

## 2024-12-10 NOTE — ED PROVIDER NOTE - ATTENDING APP SHARED VISIT CONTRIBUTION OF CARE
Chief Complaint:    - One day of nausea and vomiting with associated epigastric pain and decreased PO intake     HPI:    - Ms. Wiley, a 24-year-old female with a history of type 1 diabetes, presented with one day of nausea and vomiting. She reported waking up around 7 a.m. with nausea and subsequently vomited about six times throughout the day. She has also been experiencing epigastric pain and a decreased PO intake. She tried to maintain her sugar levels by drinking juice but was unable to tolerate it. There was progressive worsening of her condition, leading her to seek medical evaluation. She denies experiencing diarrhea or fevers. 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     Past Medical History:    -  Type 1 Diabetes     Medications:    -  Insulin pump     Social History:    - Works at a nursing facility     Review of Systems:    -  Gastrointestinal: Reported nausea, vomiting, epigastric pain, and decreased PO intake. Denies fever and diarrhea.    -  Endocrine: History of Type I Diabetes on Insulin pump. Attempted sugar regulation with juice     Physical Examination:    -  The patient appears overall well. There is mild tenderness noted in the epigastric region. Rest of the physical examination is non-contributory. soft, ntnd, no rebound or guarding on attending exam     Labs and Studies:    -  will obtain iv, cbc, cmp, vbg, fluids, antiemetics and reassess   Medical Decision Making:    -  Given the patient’s history of type 1 diabetes and presenting symptoms of nausea, vomiting, epigastric pain, and decreased PO intake, immediate concerns include diabetic ketoacidosis (DKA) and gastroenteritis. The patient will be treated with fluids and ondansetron (Zofran). Pending completion of fluids, patient’s nausea, vomiting, and abdominal pain will be reassessed. A plan will be made to potentially discharge the patient if she is able to tolerate fluid intake.     Impression:    -  Nausea and Vomiting, Epigastric Pain, Possible Gastroenteritis, Potential Diabetic Ketoacidosis (DKA)

## 2024-12-12 LAB
CULTURE RESULTS: SIGNIFICANT CHANGE UP
SPECIMEN SOURCE: SIGNIFICANT CHANGE UP

## 2024-12-30 ENCOUNTER — APPOINTMENT (OUTPATIENT)
Dept: OBGYN | Facility: CLINIC | Age: 24
End: 2024-12-30
